# Patient Record
Sex: FEMALE | Race: WHITE | NOT HISPANIC OR LATINO | URBAN - METROPOLITAN AREA
[De-identification: names, ages, dates, MRNs, and addresses within clinical notes are randomized per-mention and may not be internally consistent; named-entity substitution may affect disease eponyms.]

---

## 2017-01-19 ENCOUNTER — OUTPATIENT (OUTPATIENT)
Dept: OUTPATIENT SERVICES | Facility: HOSPITAL | Age: 32
LOS: 1 days | Discharge: HOME | End: 2017-01-19

## 2017-06-27 DIAGNOSIS — N92.4 EXCESSIVE BLEEDING IN THE PREMENOPAUSAL PERIOD: ICD-10-CM

## 2017-06-27 DIAGNOSIS — F17.210 NICOTINE DEPENDENCE, CIGARETTES, UNCOMPLICATED: ICD-10-CM

## 2017-08-14 ENCOUNTER — APPOINTMENT (OUTPATIENT)
Dept: OTOLARYNGOLOGY | Facility: CLINIC | Age: 32
End: 2017-08-14
Payer: MEDICAID

## 2017-08-14 VITALS — BODY MASS INDEX: 23.9 KG/M2 | WEIGHT: 140 LBS | HEIGHT: 64 IN

## 2017-08-14 DIAGNOSIS — Z84.1 FAMILY HISTORY OF DISORDERS OF KIDNEY AND URETER: ICD-10-CM

## 2017-08-14 DIAGNOSIS — Z80.8 FAMILY HISTORY OF MALIGNANT NEOPLASM OF OTHER ORGANS OR SYSTEMS: ICD-10-CM

## 2017-08-14 DIAGNOSIS — Z80.1 FAMILY HISTORY OF MALIGNANT NEOPLASM OF TRACHEA, BRONCHUS AND LUNG: ICD-10-CM

## 2017-08-14 PROCEDURE — 99203 OFFICE O/P NEW LOW 30 MIN: CPT | Mod: 25

## 2017-08-14 PROCEDURE — 31231 NASAL ENDOSCOPY DX: CPT

## 2017-09-15 ENCOUNTER — RX RENEWAL (OUTPATIENT)
Age: 32
End: 2017-09-15

## 2017-09-19 ENCOUNTER — OUTPATIENT (OUTPATIENT)
Dept: OUTPATIENT SERVICES | Facility: HOSPITAL | Age: 32
LOS: 1 days | Discharge: HOME | End: 2017-09-19

## 2017-09-19 DIAGNOSIS — J34.89 OTHER SPECIFIED DISORDERS OF NOSE AND NASAL SINUSES: ICD-10-CM

## 2017-10-04 ENCOUNTER — APPOINTMENT (OUTPATIENT)
Dept: OTOLARYNGOLOGY | Facility: CLINIC | Age: 32
End: 2017-10-04
Payer: MEDICAID

## 2017-10-04 PROCEDURE — 99213 OFFICE O/P EST LOW 20 MIN: CPT

## 2017-11-02 ENCOUNTER — EMERGENCY (EMERGENCY)
Facility: HOSPITAL | Age: 32
LOS: 1 days | End: 2017-11-02
Admitting: GENERAL PRACTICE

## 2017-11-07 ENCOUNTER — OUTPATIENT (OUTPATIENT)
Dept: OUTPATIENT SERVICES | Facility: HOSPITAL | Age: 32
LOS: 1 days | Discharge: HOME | End: 2017-11-07

## 2017-11-07 DIAGNOSIS — Z01.818 ENCOUNTER FOR OTHER PREPROCEDURAL EXAMINATION: ICD-10-CM

## 2017-11-07 DIAGNOSIS — J34.89 OTHER SPECIFIED DISORDERS OF NOSE AND NASAL SINUSES: ICD-10-CM

## 2017-11-08 DIAGNOSIS — Z79.899 OTHER LONG TERM (CURRENT) DRUG THERAPY: ICD-10-CM

## 2017-11-08 DIAGNOSIS — N83.201 UNSPECIFIED OVARIAN CYST, RIGHT SIDE: ICD-10-CM

## 2017-11-08 DIAGNOSIS — Z98.51 TUBAL LIGATION STATUS: ICD-10-CM

## 2017-11-08 DIAGNOSIS — Z87.891 PERSONAL HISTORY OF NICOTINE DEPENDENCE: ICD-10-CM

## 2017-11-08 DIAGNOSIS — R10.2 PELVIC AND PERINEAL PAIN: ICD-10-CM

## 2017-11-13 ENCOUNTER — RECORD ABSTRACTING (OUTPATIENT)
Age: 32
End: 2017-11-13

## 2017-11-13 DIAGNOSIS — Z86.19 PERSONAL HISTORY OF OTHER INFECTIOUS AND PARASITIC DISEASES: ICD-10-CM

## 2017-11-21 ENCOUNTER — OUTPATIENT (OUTPATIENT)
Dept: OUTPATIENT SERVICES | Facility: HOSPITAL | Age: 32
LOS: 1 days | Discharge: HOME | End: 2017-11-21

## 2017-11-21 ENCOUNTER — APPOINTMENT (OUTPATIENT)
Dept: OTOLARYNGOLOGY | Facility: AMBULATORY SURGERY CENTER | Age: 32
End: 2017-11-21
Payer: MEDICAID

## 2017-11-21 PROCEDURE — 30520 REPAIR OF NASAL SEPTUM: CPT

## 2017-11-21 PROCEDURE — 31296 NSL/SINS NDSC SURG FRNT SINS: CPT | Mod: 50

## 2017-11-21 PROCEDURE — 31295 NSL/SINS NDSC SURG MAX SINS: CPT | Mod: 50

## 2017-11-21 PROCEDURE — 30140 RESECT INFERIOR TURBINATE: CPT | Mod: 50

## 2017-11-21 PROCEDURE — 69799 UNLISTED PX MIDDLE EAR: CPT

## 2017-11-27 ENCOUNTER — APPOINTMENT (OUTPATIENT)
Dept: OTOLARYNGOLOGY | Facility: CLINIC | Age: 32
End: 2017-11-27
Payer: MEDICAID

## 2017-11-27 VITALS — HEIGHT: 64 IN | BODY MASS INDEX: 24.41 KG/M2 | WEIGHT: 143 LBS

## 2017-11-27 PROCEDURE — 99024 POSTOP FOLLOW-UP VISIT: CPT

## 2017-11-28 DIAGNOSIS — J34.2 DEVIATED NASAL SEPTUM: ICD-10-CM

## 2017-11-28 DIAGNOSIS — J01.91 ACUTE RECURRENT SINUSITIS, UNSPECIFIED: ICD-10-CM

## 2017-11-28 DIAGNOSIS — H69.83 OTHER SPECIFIED DISORDERS OF EUSTACHIAN TUBE, BILATERAL: ICD-10-CM

## 2017-11-28 DIAGNOSIS — J34.89 OTHER SPECIFIED DISORDERS OF NOSE AND NASAL SINUSES: ICD-10-CM

## 2017-11-29 ENCOUNTER — APPOINTMENT (OUTPATIENT)
Dept: OTOLARYNGOLOGY | Facility: CLINIC | Age: 32
End: 2017-11-29
Payer: MEDICAID

## 2017-11-29 PROCEDURE — 99024 POSTOP FOLLOW-UP VISIT: CPT

## 2017-11-29 PROCEDURE — 31237 NSL/SINS NDSC SURG BX POLYPC: CPT | Mod: 50,78

## 2017-12-06 ENCOUNTER — APPOINTMENT (OUTPATIENT)
Dept: OTOLARYNGOLOGY | Facility: CLINIC | Age: 32
End: 2017-12-06
Payer: MEDICAID

## 2017-12-06 VITALS — BODY MASS INDEX: 24.41 KG/M2 | WEIGHT: 143 LBS | HEIGHT: 64 IN

## 2017-12-06 PROCEDURE — 99024 POSTOP FOLLOW-UP VISIT: CPT

## 2017-12-06 PROCEDURE — 31237 NSL/SINS NDSC SURG BX POLYPC: CPT | Mod: 50,58

## 2017-12-11 ENCOUNTER — APPOINTMENT (OUTPATIENT)
Dept: OBGYN | Facility: CLINIC | Age: 32
End: 2017-12-11
Payer: MEDICAID

## 2017-12-11 ENCOUNTER — APPOINTMENT (OUTPATIENT)
Dept: OBGYN | Facility: CLINIC | Age: 32
End: 2017-12-11

## 2017-12-11 PROCEDURE — 76830 TRANSVAGINAL US NON-OB: CPT

## 2017-12-11 PROCEDURE — 93975 VASCULAR STUDY: CPT

## 2017-12-13 ENCOUNTER — APPOINTMENT (OUTPATIENT)
Dept: OTOLARYNGOLOGY | Facility: CLINIC | Age: 32
End: 2017-12-13
Payer: MEDICAID

## 2017-12-13 VITALS — HEIGHT: 64 IN | WEIGHT: 145 LBS | BODY MASS INDEX: 24.75 KG/M2

## 2017-12-13 PROCEDURE — 31237 NSL/SINS NDSC SURG BX POLYPC: CPT | Mod: 50,58

## 2017-12-13 PROCEDURE — 99024 POSTOP FOLLOW-UP VISIT: CPT

## 2018-01-17 ENCOUNTER — APPOINTMENT (OUTPATIENT)
Dept: OTOLARYNGOLOGY | Facility: CLINIC | Age: 33
End: 2018-01-17
Payer: MEDICAID

## 2018-01-17 VITALS
DIASTOLIC BLOOD PRESSURE: 72 MMHG | BODY MASS INDEX: 24.75 KG/M2 | SYSTOLIC BLOOD PRESSURE: 121 MMHG | WEIGHT: 145 LBS | HEIGHT: 64 IN

## 2018-01-17 DIAGNOSIS — J01.91 ACUTE RECURRENT SINUSITIS, UNSPECIFIED: ICD-10-CM

## 2018-01-17 PROCEDURE — 31237 NSL/SINS NDSC SURG BX POLYPC: CPT | Mod: 58

## 2018-01-17 PROCEDURE — 99024 POSTOP FOLLOW-UP VISIT: CPT

## 2018-01-17 RX ORDER — TERCONAZOLE 4 MG/G
0.4 CREAM VAGINAL
Refills: 0 | Status: COMPLETED | COMMUNITY
End: 2018-01-17

## 2018-01-17 RX ORDER — METRONIDAZOLE 500 MG/1
500 TABLET, FILM COATED ORAL
Refills: 0 | Status: COMPLETED | COMMUNITY
End: 2018-01-17

## 2018-01-29 ENCOUNTER — EMERGENCY (EMERGENCY)
Facility: HOSPITAL | Age: 33
LOS: 0 days | Discharge: HOME | End: 2018-01-29

## 2018-01-29 DIAGNOSIS — R30.0 DYSURIA: ICD-10-CM

## 2018-01-29 DIAGNOSIS — R10.2 PELVIC AND PERINEAL PAIN: ICD-10-CM

## 2018-01-29 DIAGNOSIS — Z79.899 OTHER LONG TERM (CURRENT) DRUG THERAPY: ICD-10-CM

## 2018-01-29 DIAGNOSIS — E28.2 POLYCYSTIC OVARIAN SYNDROME: ICD-10-CM

## 2018-01-29 DIAGNOSIS — R11.2 NAUSEA WITH VOMITING, UNSPECIFIED: ICD-10-CM

## 2018-02-01 ENCOUNTER — APPOINTMENT (OUTPATIENT)
Dept: OBGYN | Facility: CLINIC | Age: 33
End: 2018-02-01
Payer: MEDICAID

## 2018-02-01 PROCEDURE — 99213 OFFICE O/P EST LOW 20 MIN: CPT

## 2018-02-06 ENCOUNTER — APPOINTMENT (OUTPATIENT)
Dept: OBGYN | Facility: CLINIC | Age: 33
End: 2018-02-06
Payer: MEDICAID

## 2018-02-06 PROCEDURE — 76830 TRANSVAGINAL US NON-OB: CPT

## 2018-02-06 PROCEDURE — 93975 VASCULAR STUDY: CPT

## 2018-03-06 ENCOUNTER — APPOINTMENT (OUTPATIENT)
Dept: DERMATOLOGY | Facility: CLINIC | Age: 33
End: 2018-03-06

## 2018-05-17 ENCOUNTER — TRANSCRIPTION ENCOUNTER (OUTPATIENT)
Age: 33
End: 2018-05-17

## 2018-06-11 ENCOUNTER — APPOINTMENT (OUTPATIENT)
Dept: OBGYN | Facility: CLINIC | Age: 33
End: 2018-06-11
Payer: MEDICAID

## 2018-06-11 PROCEDURE — 76830 TRANSVAGINAL US NON-OB: CPT

## 2018-06-11 PROCEDURE — 99214 OFFICE O/P EST MOD 30 MIN: CPT | Mod: 25

## 2018-06-11 PROCEDURE — 93975 VASCULAR STUDY: CPT

## 2018-06-29 ENCOUNTER — APPOINTMENT (OUTPATIENT)
Dept: OBGYN | Facility: CLINIC | Age: 33
End: 2018-06-29

## 2018-06-29 ENCOUNTER — OUTPATIENT (OUTPATIENT)
Dept: OUTPATIENT SERVICES | Facility: HOSPITAL | Age: 33
LOS: 1 days | Discharge: HOME | End: 2018-06-29

## 2018-06-29 VITALS
HEIGHT: 64 IN | SYSTOLIC BLOOD PRESSURE: 120 MMHG | DIASTOLIC BLOOD PRESSURE: 80 MMHG | BODY MASS INDEX: 23.22 KG/M2 | WEIGHT: 136 LBS

## 2018-06-29 DIAGNOSIS — E28.2 POLYCYSTIC OVARIAN SYNDROME: ICD-10-CM

## 2018-06-29 DIAGNOSIS — Z00.00 ENCOUNTER FOR GENERAL ADULT MEDICAL EXAMINATION W/OUT ABNORMAL FINDINGS: ICD-10-CM

## 2018-06-29 RX ORDER — FLUTICASONE PROPIONATE 50 UG/1
50 SPRAY, METERED NASAL
Qty: 16 | Refills: 0 | Status: DISCONTINUED | COMMUNITY
Start: 2017-08-14 | End: 2018-06-29

## 2018-06-29 RX ORDER — LEVOFLOXACIN 500 MG/1
500 TABLET, FILM COATED ORAL
Qty: 10 | Refills: 0 | Status: DISCONTINUED | COMMUNITY
Start: 2018-05-23

## 2018-06-29 RX ORDER — NITROFURANTOIN (MONOHYDRATE/MACROCRYSTALS) 25; 75 MG/1; MG/1
100 CAPSULE ORAL
Qty: 14 | Refills: 0 | Status: DISCONTINUED | COMMUNITY
Start: 2018-01-29

## 2018-06-29 RX ORDER — METHYLPREDNISOLONE 4 MG/1
4 TABLET ORAL
Qty: 21 | Refills: 0 | Status: DISCONTINUED | COMMUNITY
Start: 2018-05-17

## 2018-06-29 RX ORDER — GUAIFENESIN 600 MG/1
600 TABLET, EXTENDED RELEASE ORAL
Qty: 10 | Refills: 0 | Status: DISCONTINUED | COMMUNITY
Start: 2018-05-17

## 2018-06-29 RX ORDER — ALBUTEROL SULFATE 90 UG/1
108 (90 BASE) AEROSOL, METERED RESPIRATORY (INHALATION)
Qty: 18 | Refills: 0 | Status: DISCONTINUED | COMMUNITY
Start: 2018-05-17

## 2018-06-29 RX ORDER — BENZONATATE 100 MG/1
100 CAPSULE ORAL
Qty: 30 | Refills: 0 | Status: DISCONTINUED | COMMUNITY
Start: 2018-05-17

## 2018-07-09 DIAGNOSIS — Z00.00 ENCOUNTER FOR GENERAL ADULT MEDICAL EXAMINATION WITHOUT ABNORMAL FINDINGS: ICD-10-CM

## 2018-07-09 DIAGNOSIS — R10.2 PELVIC AND PERINEAL PAIN: ICD-10-CM

## 2018-07-09 DIAGNOSIS — J34.89 OTHER SPECIFIED DISORDERS OF NOSE AND NASAL SINUSES: ICD-10-CM

## 2018-07-09 DIAGNOSIS — J30.9 ALLERGIC RHINITIS, UNSPECIFIED: ICD-10-CM

## 2018-07-09 DIAGNOSIS — J01.91 ACUTE RECURRENT SINUSITIS, UNSPECIFIED: ICD-10-CM

## 2018-07-11 LAB — HPV HIGH+LOW RISK DNA PNL CVX: NOT DETECTED

## 2018-07-17 ENCOUNTER — OUTPATIENT (OUTPATIENT)
Dept: OUTPATIENT SERVICES | Facility: HOSPITAL | Age: 33
LOS: 1 days | Discharge: HOME | End: 2018-07-17

## 2018-07-17 VITALS
TEMPERATURE: 98 F | HEIGHT: 64 IN | WEIGHT: 138.45 LBS | DIASTOLIC BLOOD PRESSURE: 58 MMHG | HEART RATE: 68 BPM | SYSTOLIC BLOOD PRESSURE: 110 MMHG | RESPIRATION RATE: 16 BRPM | OXYGEN SATURATION: 99 %

## 2018-07-17 DIAGNOSIS — R10.2 PELVIC AND PERINEAL PAIN: ICD-10-CM

## 2018-07-17 DIAGNOSIS — Z98.890 OTHER SPECIFIED POSTPROCEDURAL STATES: Chronic | ICD-10-CM

## 2018-07-17 DIAGNOSIS — Z90.89 ACQUIRED ABSENCE OF OTHER ORGANS: Chronic | ICD-10-CM

## 2018-07-17 DIAGNOSIS — Z01.818 ENCOUNTER FOR OTHER PREPROCEDURAL EXAMINATION: ICD-10-CM

## 2018-07-17 DIAGNOSIS — Z98.51 TUBAL LIGATION STATUS: Chronic | ICD-10-CM

## 2018-07-17 DIAGNOSIS — J34.2 DEVIATED NASAL SEPTUM: Chronic | ICD-10-CM

## 2018-07-17 LAB
ALBUMIN SERPL ELPH-MCNC: 4.3 G/DL — SIGNIFICANT CHANGE UP (ref 3.5–5.2)
ALP SERPL-CCNC: 47 U/L — SIGNIFICANT CHANGE UP (ref 30–115)
ALT FLD-CCNC: 6 U/L — SIGNIFICANT CHANGE UP (ref 0–41)
ANION GAP SERPL CALC-SCNC: 15 MMOL/L — HIGH (ref 7–14)
APPEARANCE UR: CLEAR — SIGNIFICANT CHANGE UP
APTT BLD: 30.8 SEC — SIGNIFICANT CHANGE UP (ref 27–39.2)
AST SERPL-CCNC: 12 U/L — SIGNIFICANT CHANGE UP (ref 0–41)
BACTERIA # UR AUTO: ABNORMAL /HPF
BILIRUB SERPL-MCNC: <0.2 MG/DL — SIGNIFICANT CHANGE UP (ref 0.2–1.2)
BILIRUB UR-MCNC: NEGATIVE — SIGNIFICANT CHANGE UP
BLD GP AB SCN SERPL QL: SIGNIFICANT CHANGE UP
BUN SERPL-MCNC: 7 MG/DL — LOW (ref 10–20)
CALCIUM SERPL-MCNC: 9.5 MG/DL — SIGNIFICANT CHANGE UP (ref 8.5–10.1)
CHLORIDE SERPL-SCNC: 99 MMOL/L — SIGNIFICANT CHANGE UP (ref 98–110)
CO2 SERPL-SCNC: 25 MMOL/L — SIGNIFICANT CHANGE UP (ref 17–32)
COLOR SPEC: YELLOW — SIGNIFICANT CHANGE UP
CREAT SERPL-MCNC: 0.5 MG/DL — LOW (ref 0.7–1.5)
DIFF PNL FLD: ABNORMAL
EPI CELLS # UR: ABNORMAL /HPF
GLUCOSE SERPL-MCNC: 119 MG/DL — HIGH (ref 70–99)
GLUCOSE UR QL: NEGATIVE MG/DL — SIGNIFICANT CHANGE UP
HCT VFR BLD CALC: 38.2 % — SIGNIFICANT CHANGE UP (ref 37–47)
HGB BLD-MCNC: 12.7 G/DL — SIGNIFICANT CHANGE UP (ref 12–16)
INR BLD: 0.98 RATIO — SIGNIFICANT CHANGE UP (ref 0.65–1.3)
KETONES UR-MCNC: NEGATIVE — SIGNIFICANT CHANGE UP
LEUKOCYTE ESTERASE UR-ACNC: NEGATIVE — SIGNIFICANT CHANGE UP
MCHC RBC-ENTMCNC: 30.1 PG — SIGNIFICANT CHANGE UP (ref 27–31)
MCHC RBC-ENTMCNC: 33.2 G/DL — SIGNIFICANT CHANGE UP (ref 32–37)
MCV RBC AUTO: 90.5 FL — SIGNIFICANT CHANGE UP (ref 81–99)
NITRITE UR-MCNC: NEGATIVE — SIGNIFICANT CHANGE UP
NRBC # BLD: 0 /100 WBCS — SIGNIFICANT CHANGE UP (ref 0–0)
PH UR: 6.5 — SIGNIFICANT CHANGE UP (ref 5–8)
PLATELET # BLD AUTO: 203 K/UL — SIGNIFICANT CHANGE UP (ref 130–400)
POTASSIUM SERPL-MCNC: 3.6 MMOL/L — SIGNIFICANT CHANGE UP (ref 3.5–5)
POTASSIUM SERPL-SCNC: 3.6 MMOL/L — SIGNIFICANT CHANGE UP (ref 3.5–5)
PROT SERPL-MCNC: 7 G/DL — SIGNIFICANT CHANGE UP (ref 6–8)
PROT UR-MCNC: NEGATIVE MG/DL — SIGNIFICANT CHANGE UP
PROTHROM AB SERPL-ACNC: 10.6 SEC — SIGNIFICANT CHANGE UP (ref 9.95–12.87)
RBC # BLD: 4.22 M/UL — SIGNIFICANT CHANGE UP (ref 4.2–5.4)
RBC # FLD: 12.4 % — SIGNIFICANT CHANGE UP (ref 11.5–14.5)
SODIUM SERPL-SCNC: 139 MMOL/L — SIGNIFICANT CHANGE UP (ref 135–146)
SP GR SPEC: 1.01 — SIGNIFICANT CHANGE UP (ref 1.01–1.03)
TYPE + AB SCN PNL BLD: SIGNIFICANT CHANGE UP
UROBILINOGEN FLD QL: 0.2 MG/DL — SIGNIFICANT CHANGE UP (ref 0.2–0.2)
WBC # BLD: 9.13 K/UL — SIGNIFICANT CHANGE UP (ref 4.8–10.8)
WBC # FLD AUTO: 9.13 K/UL — SIGNIFICANT CHANGE UP (ref 4.8–10.8)

## 2018-07-17 NOTE — H&P PST ADULT - HISTORY OF PRESENT ILLNESS
32 year old female here for right ovarian cyst removal hx of polycystic ovaries , and has constant pain on right side  fos=3-4  denies chest pain sob palp  denies recent uri or had recent uti treated with ab last ab was july 10  PT DENEIS ANY RASHES, ABRASION, OR OPEN WOUNDS OR CUTS

## 2018-07-17 NOTE — H&P PST ADULT - NSANTHOSAYNRD_GEN_A_CORE
see norman tool/No. NORMAN screening performed.  STOP BANG Legend: 0-2 = LOW Risk; 3-4 = INTERMEDIATE Risk; 5-8 = HIGH Risk

## 2018-07-17 NOTE — H&P PST ADULT - PSH
Deviated septum  and adnoids removed 2017  H/O lumpectomy  left breast 2001  right breast 2007  History of tonsillectomy  1998  History of tubal ligation  2016  Status post endometrial ablation  2016

## 2018-07-18 DIAGNOSIS — Z87.891 PERSONAL HISTORY OF NICOTINE DEPENDENCE: ICD-10-CM

## 2018-07-25 PROBLEM — G43.909 MIGRAINE, UNSPECIFIED, NOT INTRACTABLE, WITHOUT STATUS MIGRAINOSUS: Chronic | Status: ACTIVE | Noted: 2018-07-17

## 2018-07-25 PROBLEM — E28.2 POLYCYSTIC OVARIAN SYNDROME: Chronic | Status: ACTIVE | Noted: 2018-07-17

## 2018-08-03 ENCOUNTER — OTHER (OUTPATIENT)
Age: 33
End: 2018-08-03

## 2018-08-13 ENCOUNTER — OUTPATIENT (OUTPATIENT)
Dept: OUTPATIENT SERVICES | Facility: HOSPITAL | Age: 33
LOS: 1 days | Discharge: HOME | End: 2018-08-13

## 2018-08-13 DIAGNOSIS — Z98.890 OTHER SPECIFIED POSTPROCEDURAL STATES: Chronic | ICD-10-CM

## 2018-08-13 DIAGNOSIS — N63.10 UNSPECIFIED LUMP IN THE RIGHT BREAST, UNSPECIFIED QUADRANT: ICD-10-CM

## 2018-08-13 DIAGNOSIS — Z90.89 ACQUIRED ABSENCE OF OTHER ORGANS: Chronic | ICD-10-CM

## 2018-08-13 DIAGNOSIS — J34.2 DEVIATED NASAL SEPTUM: Chronic | ICD-10-CM

## 2018-08-13 DIAGNOSIS — Z98.51 TUBAL LIGATION STATUS: Chronic | ICD-10-CM

## 2018-09-07 ENCOUNTER — APPOINTMENT (OUTPATIENT)
Dept: BREAST CENTER | Facility: CLINIC | Age: 33
End: 2018-09-07
Payer: MEDICAID

## 2018-09-07 VITALS
BODY MASS INDEX: 23.22 KG/M2 | SYSTOLIC BLOOD PRESSURE: 118 MMHG | HEIGHT: 64 IN | WEIGHT: 136 LBS | DIASTOLIC BLOOD PRESSURE: 68 MMHG

## 2018-09-07 PROCEDURE — 99203 OFFICE O/P NEW LOW 30 MIN: CPT

## 2018-09-11 ENCOUNTER — OUTPATIENT (OUTPATIENT)
Dept: OUTPATIENT SERVICES | Facility: HOSPITAL | Age: 33
LOS: 1 days | Discharge: HOME | End: 2018-09-11

## 2018-09-11 VITALS
SYSTOLIC BLOOD PRESSURE: 113 MMHG | TEMPERATURE: 97 F | WEIGHT: 137.79 LBS | RESPIRATION RATE: 16 BRPM | DIASTOLIC BLOOD PRESSURE: 65 MMHG | HEIGHT: 65 IN | HEART RATE: 62 BPM | OXYGEN SATURATION: 100 %

## 2018-09-11 DIAGNOSIS — Z98.890 OTHER SPECIFIED POSTPROCEDURAL STATES: Chronic | ICD-10-CM

## 2018-09-11 DIAGNOSIS — J34.2 DEVIATED NASAL SEPTUM: Chronic | ICD-10-CM

## 2018-09-11 DIAGNOSIS — Z01.818 ENCOUNTER FOR OTHER PREPROCEDURAL EXAMINATION: ICD-10-CM

## 2018-09-11 DIAGNOSIS — Z87.891 PERSONAL HISTORY OF NICOTINE DEPENDENCE: ICD-10-CM

## 2018-09-11 DIAGNOSIS — R10.2 PELVIC AND PERINEAL PAIN: ICD-10-CM

## 2018-09-11 DIAGNOSIS — Z98.51 TUBAL LIGATION STATUS: Chronic | ICD-10-CM

## 2018-09-11 DIAGNOSIS — Z90.89 ACQUIRED ABSENCE OF OTHER ORGANS: Chronic | ICD-10-CM

## 2018-09-11 LAB
APPEARANCE UR: CLEAR — SIGNIFICANT CHANGE UP
BACTERIA # UR AUTO: ABNORMAL /HPF
BILIRUB UR-MCNC: NEGATIVE — SIGNIFICANT CHANGE UP
BLD GP AB SCN SERPL QL: SIGNIFICANT CHANGE UP
COLOR SPEC: YELLOW — SIGNIFICANT CHANGE UP
DIFF PNL FLD: ABNORMAL
GLUCOSE UR QL: NEGATIVE MG/DL — SIGNIFICANT CHANGE UP
KETONES UR-MCNC: NEGATIVE — SIGNIFICANT CHANGE UP
LEUKOCYTE ESTERASE UR-ACNC: NEGATIVE — SIGNIFICANT CHANGE UP
NITRITE UR-MCNC: NEGATIVE — SIGNIFICANT CHANGE UP
PH UR: 6 — SIGNIFICANT CHANGE UP (ref 5–8)
PROT UR-MCNC: NEGATIVE MG/DL — SIGNIFICANT CHANGE UP
RBC CASTS # UR COMP ASSIST: ABNORMAL /HPF
SP GR SPEC: 1.02 — SIGNIFICANT CHANGE UP (ref 1.01–1.03)
TYPE + AB SCN PNL BLD: SIGNIFICANT CHANGE UP
UROBILINOGEN FLD QL: 0.2 MG/DL — SIGNIFICANT CHANGE UP (ref 0.2–0.2)

## 2018-09-11 NOTE — H&P PST ADULT - FAMILY HISTORY
Mother  Still living? Unknown  Family history of heart disease, Age at diagnosis: Age Unknown  Family history of diabetes mellitus, Age at diagnosis: Age Unknown  Family history of renal disease, Age at diagnosis: Age Unknown     Grandparent  Still living? Unknown  Family history of heart disease, Age at diagnosis: Age Unknown  Family history of diabetes mellitus, Age at diagnosis: Age Unknown

## 2018-09-11 NOTE — H&P PST ADULT - REASON FOR ADMISSION
32 year old female here for removal of right haley eovarian cyst,having pain on right side  fos= 3  denies chest pain sob palp  denies recent uri had urinary tract Infection in july and was treated with ab

## 2018-09-11 NOTE — H&P PST ADULT - PRIMARY CARE PROVIDER
Diagnostic laparoscopy possible right ovarian cystectomy, posible right salpingo-oophorectomy possible lysis of adhesions possible exploratory laparotomy

## 2018-09-12 LAB
CULTURE RESULTS: NO GROWTH — SIGNIFICANT CHANGE UP
SPECIMEN SOURCE: SIGNIFICANT CHANGE UP

## 2018-09-25 ENCOUNTER — OUTPATIENT (OUTPATIENT)
Dept: OUTPATIENT SERVICES | Facility: HOSPITAL | Age: 33
LOS: 1 days | Discharge: HOME | End: 2018-09-25

## 2018-09-25 VITALS
OXYGEN SATURATION: 98 % | SYSTOLIC BLOOD PRESSURE: 110 MMHG | DIASTOLIC BLOOD PRESSURE: 59 MMHG | HEART RATE: 74 BPM | RESPIRATION RATE: 15 BRPM

## 2018-09-25 VITALS
WEIGHT: 137.79 LBS | SYSTOLIC BLOOD PRESSURE: 105 MMHG | OXYGEN SATURATION: 100 % | HEART RATE: 76 BPM | HEIGHT: 65 IN | DIASTOLIC BLOOD PRESSURE: 53 MMHG | RESPIRATION RATE: 18 BRPM | TEMPERATURE: 98 F

## 2018-09-25 DIAGNOSIS — Z98.51 TUBAL LIGATION STATUS: Chronic | ICD-10-CM

## 2018-09-25 DIAGNOSIS — Z98.890 OTHER SPECIFIED POSTPROCEDURAL STATES: Chronic | ICD-10-CM

## 2018-09-25 DIAGNOSIS — Z90.89 ACQUIRED ABSENCE OF OTHER ORGANS: Chronic | ICD-10-CM

## 2018-09-25 DIAGNOSIS — J34.2 DEVIATED NASAL SEPTUM: Chronic | ICD-10-CM

## 2018-09-25 RX ORDER — SODIUM CHLORIDE 9 MG/ML
1000 INJECTION, SOLUTION INTRAVENOUS
Qty: 0 | Refills: 0 | Status: DISCONTINUED | OUTPATIENT
Start: 2018-09-25 | End: 2018-10-10

## 2018-09-25 RX ORDER — OXYCODONE AND ACETAMINOPHEN 5; 325 MG/1; MG/1
1 TABLET ORAL EVERY 4 HOURS
Qty: 0 | Refills: 0 | Status: DISCONTINUED | OUTPATIENT
Start: 2018-09-25 | End: 2018-09-25

## 2018-09-25 RX ORDER — ONDANSETRON 8 MG/1
4 TABLET, FILM COATED ORAL ONCE
Qty: 0 | Refills: 0 | Status: DISCONTINUED | OUTPATIENT
Start: 2018-09-25 | End: 2018-10-10

## 2018-09-25 RX ORDER — MORPHINE SULFATE 50 MG/1
2 CAPSULE, EXTENDED RELEASE ORAL
Qty: 0 | Refills: 0 | Status: DISCONTINUED | OUTPATIENT
Start: 2018-09-25 | End: 2018-09-25

## 2018-09-25 RX ADMIN — SODIUM CHLORIDE 100 MILLILITER(S): 9 INJECTION, SOLUTION INTRAVENOUS at 14:00

## 2018-09-25 NOTE — CHART NOTE - NSCHARTNOTEFT_GEN_A_CORE
P[facility] ANESTHESIA ADMISSION NOTE      Procedure: Laparoscopy, diagnostic, for chronic pelvic pain    Post op diagnosis:  Pelvic pain in female      ____  Intubated  TV:______       Rate: ______      FiO2: ______    _x___  Patent Airway    _x___  Full return of protective reflexes    _x___  Full recovery from anesthesia / back to baseline status    Vitals:            T:  97.5              BP :    133/79            R: 12             Sat: 98              P:76      Mental Status:  _x___ Awake   _____ Alert   _____ Drowsy   _____ Sedated    Nausea/Vomiting:  _x___  NO       ______Yes,   See Post - Op Orders         Pain Scale (0-10):  __0___    Treatment: _x___ None    ____ See Post - Op/PCA Orders    Post - Operative Fluids:   __x__ Oral   ____ See Post - Op Orders    Plan: Discharge:   _x___Home       _____Floor     _____Critical Care    _____  Other:_________________    Comments:  No anesthesia issues or complications noted.  Discharge when criteria met.

## 2018-09-25 NOTE — BRIEF OPERATIVE NOTE - OPERATION/FINDINGS
normal sized uterus-mobile, b/l normal ovaries mobile, tubes consistent with prior tubal, no other tubal issues, no adhesions, normal cul de sac, b/l normal ovarian fossa, normal appendiceal area.

## 2018-09-25 NOTE — BRIEF OPERATIVE NOTE - PROCEDURE
<<-----Click on this checkbox to enter Procedure Laparoscopy, diagnostic, for chronic pelvic pain  09/25/2018    Active  [id number]

## 2018-09-25 NOTE — ASU DISCHARGE PLAN (ADULT/PEDIATRIC). - SPECIAL INSTRUCTIONS
-Regular activity and diet  -Motrin 600mg for pain every 6 hours as needed, percocet sent to pharmacy  -Keep incisions clean and dry. Remove dressings in 24hrs.  -Nothing in the vagina for 2 weeks - no sex, tampons, douching, tub baths, or pools. May shower.  -Follow up in 2 weeks with Dr. Trimble for post-operative check at the [facility].

## 2018-09-28 DIAGNOSIS — F17.210 NICOTINE DEPENDENCE, CIGARETTES, UNCOMPLICATED: ICD-10-CM

## 2018-09-28 DIAGNOSIS — R10.2 PELVIC AND PERINEAL PAIN: ICD-10-CM

## 2018-09-28 DIAGNOSIS — G89.29 OTHER CHRONIC PAIN: ICD-10-CM

## 2018-10-16 ENCOUNTER — APPOINTMENT (OUTPATIENT)
Dept: OBGYN | Facility: CLINIC | Age: 33
End: 2018-10-16

## 2018-10-16 ENCOUNTER — OUTPATIENT (OUTPATIENT)
Dept: OUTPATIENT SERVICES | Facility: HOSPITAL | Age: 33
LOS: 1 days | Discharge: HOME | End: 2018-10-16

## 2018-10-16 VITALS
DIASTOLIC BLOOD PRESSURE: 76 MMHG | BODY MASS INDEX: 23.47 KG/M2 | SYSTOLIC BLOOD PRESSURE: 116 MMHG | WEIGHT: 137.5 LBS | HEIGHT: 64 IN

## 2018-10-16 DIAGNOSIS — Z98.51 TUBAL LIGATION STATUS: Chronic | ICD-10-CM

## 2018-10-16 DIAGNOSIS — Z98.890 OTHER SPECIFIED POSTPROCEDURAL STATES: Chronic | ICD-10-CM

## 2018-10-16 DIAGNOSIS — Z87.898 PERSONAL HISTORY OF OTHER SPECIFIED CONDITIONS: ICD-10-CM

## 2018-10-16 DIAGNOSIS — B37.2 CANDIDIASIS OF SKIN AND NAIL: ICD-10-CM

## 2018-10-16 DIAGNOSIS — Z90.89 ACQUIRED ABSENCE OF OTHER ORGANS: Chronic | ICD-10-CM

## 2018-10-16 DIAGNOSIS — J34.2 DEVIATED NASAL SEPTUM: Chronic | ICD-10-CM

## 2018-10-16 RX ORDER — CLOTRIMAZOLE AND BETAMETHASONE DIPROPIONATE 10; .5 MG/G; MG/G
1-0.05 CREAM TOPICAL TWICE DAILY
Qty: 1 | Refills: 3 | Status: ACTIVE | COMMUNITY
Start: 2018-10-16 | End: 1900-01-01

## 2018-10-17 DIAGNOSIS — B37.2 CANDIDIASIS OF SKIN AND NAIL: ICD-10-CM

## 2018-12-17 ENCOUNTER — APPOINTMENT (OUTPATIENT)
Dept: BREAST CENTER | Facility: CLINIC | Age: 33
End: 2018-12-17

## 2019-02-13 ENCOUNTER — FORM ENCOUNTER (OUTPATIENT)
Age: 34
End: 2019-02-13

## 2019-02-14 ENCOUNTER — OUTPATIENT (OUTPATIENT)
Dept: OUTPATIENT SERVICES | Facility: HOSPITAL | Age: 34
LOS: 1 days | Discharge: HOME | End: 2019-02-14

## 2019-02-14 DIAGNOSIS — Z98.51 TUBAL LIGATION STATUS: Chronic | ICD-10-CM

## 2019-02-14 DIAGNOSIS — Z98.890 OTHER SPECIFIED POSTPROCEDURAL STATES: Chronic | ICD-10-CM

## 2019-02-14 DIAGNOSIS — Z90.89 ACQUIRED ABSENCE OF OTHER ORGANS: Chronic | ICD-10-CM

## 2019-02-14 DIAGNOSIS — J34.2 DEVIATED NASAL SEPTUM: Chronic | ICD-10-CM

## 2019-02-14 DIAGNOSIS — R92.8 OTHER ABNORMAL AND INCONCLUSIVE FINDINGS ON DIAGNOSTIC IMAGING OF BREAST: ICD-10-CM

## 2019-03-01 ENCOUNTER — OUTPATIENT (OUTPATIENT)
Dept: OUTPATIENT SERVICES | Facility: HOSPITAL | Age: 34
LOS: 1 days | End: 2019-03-01
Payer: MEDICAID

## 2019-03-01 DIAGNOSIS — J34.2 DEVIATED NASAL SEPTUM: Chronic | ICD-10-CM

## 2019-03-01 DIAGNOSIS — Z98.51 TUBAL LIGATION STATUS: Chronic | ICD-10-CM

## 2019-03-01 DIAGNOSIS — Z98.890 OTHER SPECIFIED POSTPROCEDURAL STATES: Chronic | ICD-10-CM

## 2019-03-01 DIAGNOSIS — Z90.89 ACQUIRED ABSENCE OF OTHER ORGANS: Chronic | ICD-10-CM

## 2019-03-01 PROCEDURE — G9001: CPT

## 2019-03-18 ENCOUNTER — APPOINTMENT (OUTPATIENT)
Dept: BREAST CENTER | Facility: CLINIC | Age: 34
End: 2019-03-18
Payer: MEDICAID

## 2019-03-18 VITALS
BODY MASS INDEX: 23.39 KG/M2 | DIASTOLIC BLOOD PRESSURE: 76 MMHG | TEMPERATURE: 98.2 F | HEIGHT: 64 IN | WEIGHT: 137 LBS | SYSTOLIC BLOOD PRESSURE: 124 MMHG

## 2019-03-18 DIAGNOSIS — N63.20 UNSPECIFIED LUMP IN THE LEFT BREAST, UNSPECIFIED QUADRANT: ICD-10-CM

## 2019-03-18 PROCEDURE — 99213 OFFICE O/P EST LOW 20 MIN: CPT

## 2019-03-19 NOTE — HISTORY OF PRESENT ILLNESS
[FreeTextEntry1] : Birdie is a 32F who presents with a self palpated right breast mass. \par \par She first noticed this mass several weeks prior.  She underwent the following diagnostic imaging at that time:\par \par 18 -- b/l dx mammogram and R breast US \par -heterogenously dense breasts \par -R breast mass in UOQ, 1.1 cm\par -no suspicious architectural distortion or calcifications identified \par R breast US \par -@10:00, 10 cm FN, oval circumscribed hypoechoic mass, 1 x 1 x 0.6 cm \par BIRADS 3: rec R dx mammogram and US in 6 months \par \par She also has tenderness in the UOQ in the area of this right breast mass.  She denies any overlying skin changes, fevers or chills, does not think that the mass has changed in size at all and she has not had any nipple discharge b/l or palpated any left sided breast lesions. Of note, she has had her nipple pierced in the past and is a current smoker. She also has polycystic ovarian syndrome and is scheduled for a R salpingo-oophorectomy on 18 for a painful R ovarian cyst.  \par \par HISTORICAL RISK FACTORS: \par -2 prior lumpectomy:\par     @age 17, R inferior lumpectomy -- benign \par     @age 21, L UIQ lumpectomy -- benign \par -family history of breast cancer in two maternal aunts, dx in their 50s \par -, age at first live birth was 22\par -currently on OCPs\par \par RISK ASSESSMENT:\par Betsy \par 5 yr -- 1.3%\par lifetime -- 10.9%\par \par TC v 6 \par 5yr -- 2%\par lifetime -- 12.7%\par \par INTERVAL HISTORY: \par Birdie returns for a 6 month follow up visit for b/l breast pain and a R breast mass @10N10 deemed BIRADS 3 on US.  \par \par She still has breast pain in bilateral breasts, but the pain is cyclical and worse on the right breast. She has tried caffeine cessation and supportive bras without any relief of her pain.  Of note, she the pain is worst in the R breast UOQ where her mass was identified.  She has not palpated any new lesions and denies any nipple discharge or retraction.\par \par Her most recent imaging was a R mammogram and US on 19 which showed stability of her right breast mass @10N10, measuring 1 x 1 x 0.5 cm, deemed BIRADS 3.\par \par \par Since her last visit, she has undergone an ovarian cystectomy for a painful ovarian cyst. \par \par

## 2019-03-19 NOTE — PHYSICAL EXAM
[Normocephalic] : normocephalic [Atraumatic] : atraumatic [EOMI] : extra ocular movement intact [No Supraclavicular Adenopathy] : no supraclavicular adenopathy [No Cervical Adenopathy] : no cervical adenopathy [Symmetrical] : symmetrical [No dominant masses] : no dominant masses left breast [No Nipple Retraction] : no left nipple retraction [No Nipple Discharge] : no left nipple discharge [No Axillary Lymphadenopathy] : no left axillary lymphadenopathy [Soft] : abdomen soft [Not Tender] : non-tender [No Edema] : no edema [No Rashes] : no rashes [No Ulceration] : no ulceration [de-identified] : in UOQ, @10:00, 10 cm FN, there is a 1 cm mobile rubbery mass without any overlying skin changes, likely same mass that was visualized on US and mammo, no other suspicious lesions were palpated  [Examined in the supine and seated position] : examined in the supine and seated position [de-identified] : 5 mm nodule palpated @3-4:00, 7-8 cm FN, mobile, feels like fibrocystic breast changes, no other suspicious lesions palpated

## 2019-03-19 NOTE — REVIEW OF SYSTEMS
[Breast Lump] : breast lump [Breast Pain] : breast pain [Negative] : Heme/Lymph [Fever] : no fever [Chills] : no chills [Feeling Poorly] : not feeling poorly [Feeling Tired] : not feeling tired [As Noted in HPI] : as noted in HPI [Pelvic Pain] : pelvic pain [Abn Vaginal Bleeding] : no unexplained vaginal bleeding [Skin Lesions] : no skin lesions [Skin Wound] : no skin wound [Hot Flashes] : no hot flashes

## 2019-03-19 NOTE — DATA REVIEWED
[FreeTextEntry1] : EXAM: MG MAMMO DIAG W [facility] BI# \par EXAM: US BREAST LIMITED BI \par \par \par PROCEDURE DATE: 08/13/2018 \par \par \par \par INTERPRETATION: Clinical History / Reason for exam: Patient presents with a \par palpable lump in the upper outer quadrant of the right breast. \par \par The patient reports her last clinical breast examination was performed over \par one year ago \par \par Spot magnification imaging of the symptomatic area was performed in addition \par to routine mammographic projections including tomosynthesis. \par \par Computer-aided detection was utilized in the interpretation of this \par examination. \par \par No prior mammograms are available for comparison. \par \par Breast composition:The breasts are heterogeneously dense, which may obscure \par small masses. \par \par In the region of patient's palpable abnormality in the upper outer quadrant \par of the right breast, no suspicious findings are seen. There is a mass in the \par upper outer quadrant of the right breast measuring 1.1 cm. See the sonogram \par report below. No areas of suspicious architectural distortion or abnormal \par groups of calcifications are identified. \par \par Whole Right Breast Sonogram: \par \par In the region of patient's palpable abnormality at 2:00 location 10 cm from \par the nipple, no suspicious findings are identified. \par \par There is an oval circumscribed hypoechoic mass at the 10:00 location 10 cm \par from the nipple corresponding to the mammographic findings measuring 1.0 cm \par x 1.0 cm x 0.6 cm. This is probably benign. \par \par Evaluation of the right axilla demonstrates normal-appearing lymph nodes. \par \par IMPRESSION: No mammographic or sonographic correlate for the reported \par palpable abnormality in the right breast. The failure to confirm a lesion \par mammographically or sonographically should not deter biopsy if there is felt \par to be a clinically suspicious palpable abnormality. \par \par Probably benign mass seen mammographically and sonographically at the 10:00 \par location 10 cm from the nipple as above. \par \par Recommendation: Follow-up unilateral diagnostic mammogram and ultrasound in \par 6 months. \par \par BI-RADS category 3: Probably Benign \par

## 2019-03-19 NOTE — ASSESSMENT
[FreeTextEntry1] : Birdie is a 33F who presents with a right breast mass, also visualized on imaging as a BIRADS 3 lesion. \par \par I was able to palpate a 1 cm R UOQ breast mass that is rubbery in nature and mobile.  In addition, she has a nodule in her left breast @3-4:00, 7-8 cm FN that feels like fibrocystic changes.   Her most recent imaging was on 2/14/19, a R dx mammogram and US which revealed this right breast mass as a STABLE oval circumscribed and hypoechoic, measuring 1 x 1 x 0.5 cm @10:00, 10 cm FN, deemed BIRADS 3.  No other suspicious lesions were seen in her right breast. \par \par Because of her left breast findings on exam today, I will have her undergo a b/l dx mammogram and b/l breast US in 6 months.  This will be scheduled for her today.  I will have her follow up with me after her repeat imaging. \par \par In regards to her breast pain, it may be related to fibrocystic changes within her breast that are hormonally influenced. We spoke about possible interventions including evening primrose oil, supportive bras, smoking cessation and decreasing caffeine intake.  Although none of these have been consistently proven to improve breast pain, they may be tried.  If the pain becomes very severe, there have been studies of tamoxifen being effective for the treatment of breast pain, although there are risks with tamoxifen especially because she is still smoking.  At this time she will try supportive measures.\par \par In regards to her pain, if these supportive measures do not alleviate her breast pain, then we could consider performing a biopsy and possible excision of this right breast lesion, however at this time we will just follow her clinically.  She will call back if she wants to proceed with biopsy and subsequent excision.  \par \par We discussed BIRADS 3 lesions.  These lesions have a 2% chance of harboring malignancy.  There is always an option to obtain a tissue sample with a biopsy to confirm the diagnosis.   The procedure was described in detail including the placement of a tissue marker clip.  The risks of the procedure, including but not limited to bleeding and infection were also explained.  She is not interested in biopsy at this time and agrees to continue with short-term interval imaging.\par \par We also discussed dense breasts.  Increasing breast density has been found to increase ones risk of breast cancer, but at this time, there is no clear indication for additional imaging in this setting, as both US and MRI have not been found to improve survival.  One can consider bilateral screening US.  However, out of 1000 women screened, the use of routine US will only identify an additional 3-5 cancers.  The use of US was found to increase the likelihood of undergoing more imaging and more biopsies.  She does have dense breasts.  We have decided to proceed with screening bilateral breast US at this time.  This will be scheduled with her next screening mammogram.\par \par Based off her family history  her risk assessment is as follows: \par RISK ASSESSMENT:\par Betsy \par 5 yr -- 1.3%\par lifetime -- 10.9%\par \par TC v 6 \par 5yr -- 2%\par lifetime -- 12.7%\par \par This puts her at an average risk for breast cancer.  She should start yearly screening mammogram/US at the age 40 and annually thereafter. \par \par All of her questions were answered.  She knows to call with any further questions or concerns. \par \par PLAN\par -b/l dx mammogram and US on 8/14/19\par -f/up in after imaging

## 2019-03-19 NOTE — PAST MEDICAL HISTORY
[Menstruating] : The patient is menstruating [Menarche Age ____] : age at menarche was [unfilled] [Definite ___ (Date)] : the last menstrual period was [unfilled] [Regular Cycle Intervals] : have been regular [Normal Amount/Duration] : it was of a normal amount and duration [Total Preg ___] : G[unfilled] [Age At Live Birth ___] : Age at live birth: [unfilled] [Live Births ___] : P[unfilled]  [History of Hormone Replacement Treatment] : has no history of hormone replacement treatment [FreeTextEntry5] : s/p endometrial ablation and tubal ligation in 2016\par scheduled for a R salpingo-oophorectomy for painful ovarian cyst on 9/25/18 [FreeTextEntry6] : denies [FreeTextEntry7] : yes currently  [FreeTextEntry8] : denies

## 2019-03-20 DIAGNOSIS — Z71.89 OTHER SPECIFIED COUNSELING: ICD-10-CM

## 2019-05-18 ENCOUNTER — EMERGENCY (EMERGENCY)
Facility: HOSPITAL | Age: 34
LOS: 0 days | Discharge: HOME | End: 2019-05-18
Attending: STUDENT IN AN ORGANIZED HEALTH CARE EDUCATION/TRAINING PROGRAM | Admitting: STUDENT IN AN ORGANIZED HEALTH CARE EDUCATION/TRAINING PROGRAM
Payer: MEDICAID

## 2019-05-18 VITALS
HEART RATE: 78 BPM | RESPIRATION RATE: 18 BRPM | TEMPERATURE: 98 F | OXYGEN SATURATION: 99 % | SYSTOLIC BLOOD PRESSURE: 119 MMHG | DIASTOLIC BLOOD PRESSURE: 64 MMHG

## 2019-05-18 VITALS — HEIGHT: 65 IN | WEIGHT: 130.07 LBS

## 2019-05-18 DIAGNOSIS — R10.9 UNSPECIFIED ABDOMINAL PAIN: ICD-10-CM

## 2019-05-18 DIAGNOSIS — J34.2 DEVIATED NASAL SEPTUM: Chronic | ICD-10-CM

## 2019-05-18 DIAGNOSIS — Z90.89 ACQUIRED ABSENCE OF OTHER ORGANS: Chronic | ICD-10-CM

## 2019-05-18 DIAGNOSIS — Z79.899 OTHER LONG TERM (CURRENT) DRUG THERAPY: ICD-10-CM

## 2019-05-18 DIAGNOSIS — Z91.013 ALLERGY TO SEAFOOD: ICD-10-CM

## 2019-05-18 DIAGNOSIS — Z98.51 TUBAL LIGATION STATUS: Chronic | ICD-10-CM

## 2019-05-18 DIAGNOSIS — Z87.891 PERSONAL HISTORY OF NICOTINE DEPENDENCE: ICD-10-CM

## 2019-05-18 DIAGNOSIS — Z98.890 OTHER SPECIFIED POSTPROCEDURAL STATES: Chronic | ICD-10-CM

## 2019-05-18 LAB
ALBUMIN SERPL ELPH-MCNC: 4.4 G/DL — SIGNIFICANT CHANGE UP (ref 3.5–5.2)
ALP SERPL-CCNC: 61 U/L — SIGNIFICANT CHANGE UP (ref 30–115)
ALT FLD-CCNC: 10 U/L — SIGNIFICANT CHANGE UP (ref 0–41)
ANION GAP SERPL CALC-SCNC: 14 MMOL/L — SIGNIFICANT CHANGE UP (ref 7–14)
APPEARANCE UR: ABNORMAL
AST SERPL-CCNC: 13 U/L — SIGNIFICANT CHANGE UP (ref 0–41)
BACTERIA # UR AUTO: ABNORMAL /HPF
BASOPHILS # BLD AUTO: 0.09 K/UL — SIGNIFICANT CHANGE UP (ref 0–0.2)
BASOPHILS NFR BLD AUTO: 0.8 % — SIGNIFICANT CHANGE UP (ref 0–1)
BILIRUB SERPL-MCNC: 0.2 MG/DL — SIGNIFICANT CHANGE UP (ref 0.2–1.2)
BILIRUB UR-MCNC: NEGATIVE — SIGNIFICANT CHANGE UP
BUN SERPL-MCNC: 10 MG/DL — SIGNIFICANT CHANGE UP (ref 10–20)
CALCIUM SERPL-MCNC: 9.5 MG/DL — SIGNIFICANT CHANGE UP (ref 8.5–10.1)
CHLORIDE SERPL-SCNC: 101 MMOL/L — SIGNIFICANT CHANGE UP (ref 98–110)
CO2 SERPL-SCNC: 23 MMOL/L — SIGNIFICANT CHANGE UP (ref 17–32)
COLOR SPEC: YELLOW — SIGNIFICANT CHANGE UP
CREAT SERPL-MCNC: 0.8 MG/DL — SIGNIFICANT CHANGE UP (ref 0.7–1.5)
DIFF PNL FLD: ABNORMAL
EOSINOPHIL # BLD AUTO: 0.11 K/UL — SIGNIFICANT CHANGE UP (ref 0–0.7)
EOSINOPHIL NFR BLD AUTO: 1 % — SIGNIFICANT CHANGE UP (ref 0–8)
GLUCOSE SERPL-MCNC: 80 MG/DL — SIGNIFICANT CHANGE UP (ref 70–99)
GLUCOSE UR QL: NEGATIVE MG/DL — SIGNIFICANT CHANGE UP
HCG SERPL QL: NEGATIVE — SIGNIFICANT CHANGE UP
HCT VFR BLD CALC: 40.1 % — SIGNIFICANT CHANGE UP (ref 37–47)
HGB BLD-MCNC: 13.6 G/DL — SIGNIFICANT CHANGE UP (ref 12–16)
IMM GRANULOCYTES NFR BLD AUTO: 0.5 % — HIGH (ref 0.1–0.3)
KETONES UR-MCNC: NEGATIVE — SIGNIFICANT CHANGE UP
LACTATE SERPL-SCNC: 1.5 MMOL/L — SIGNIFICANT CHANGE UP (ref 0.5–2.2)
LEUKOCYTE ESTERASE UR-ACNC: ABNORMAL
LIDOCAIN IGE QN: 22 U/L — SIGNIFICANT CHANGE UP (ref 7–60)
LYMPHOCYTES # BLD AUTO: 3.74 K/UL — HIGH (ref 1.2–3.4)
LYMPHOCYTES # BLD AUTO: 34 % — SIGNIFICANT CHANGE UP (ref 20.5–51.1)
MCHC RBC-ENTMCNC: 30.9 PG — SIGNIFICANT CHANGE UP (ref 27–31)
MCHC RBC-ENTMCNC: 33.9 G/DL — SIGNIFICANT CHANGE UP (ref 32–37)
MCV RBC AUTO: 91.1 FL — SIGNIFICANT CHANGE UP (ref 81–99)
MONOCYTES # BLD AUTO: 0.95 K/UL — HIGH (ref 0.1–0.6)
MONOCYTES NFR BLD AUTO: 8.6 % — SIGNIFICANT CHANGE UP (ref 1.7–9.3)
NEUTROPHILS # BLD AUTO: 6.04 K/UL — SIGNIFICANT CHANGE UP (ref 1.4–6.5)
NEUTROPHILS NFR BLD AUTO: 55.1 % — SIGNIFICANT CHANGE UP (ref 42.2–75.2)
NITRITE UR-MCNC: NEGATIVE — SIGNIFICANT CHANGE UP
NRBC # BLD: 0 /100 WBCS — SIGNIFICANT CHANGE UP (ref 0–0)
PH UR: 6.5 — SIGNIFICANT CHANGE UP (ref 5–8)
PLATELET # BLD AUTO: 202 K/UL — SIGNIFICANT CHANGE UP (ref 130–400)
POTASSIUM SERPL-MCNC: 3.9 MMOL/L — SIGNIFICANT CHANGE UP (ref 3.5–5)
POTASSIUM SERPL-SCNC: 3.9 MMOL/L — SIGNIFICANT CHANGE UP (ref 3.5–5)
PROT SERPL-MCNC: 6.9 G/DL — SIGNIFICANT CHANGE UP (ref 6–8)
PROT UR-MCNC: NEGATIVE MG/DL — SIGNIFICANT CHANGE UP
RBC # BLD: 4.4 M/UL — SIGNIFICANT CHANGE UP (ref 4.2–5.4)
RBC # FLD: 12.8 % — SIGNIFICANT CHANGE UP (ref 11.5–14.5)
RBC CASTS # UR COMP ASSIST: ABNORMAL /HPF
SODIUM SERPL-SCNC: 138 MMOL/L — SIGNIFICANT CHANGE UP (ref 135–146)
SP GR SPEC: 1.01 — SIGNIFICANT CHANGE UP (ref 1.01–1.03)
UROBILINOGEN FLD QL: 0.2 MG/DL — SIGNIFICANT CHANGE UP (ref 0.2–0.2)
WBC # BLD: 10.99 K/UL — HIGH (ref 4.8–10.8)
WBC # FLD AUTO: 10.99 K/UL — HIGH (ref 4.8–10.8)
WBC UR QL: SIGNIFICANT CHANGE UP /HPF

## 2019-05-18 PROCEDURE — 74177 CT ABD & PELVIS W/CONTRAST: CPT | Mod: 26

## 2019-05-18 PROCEDURE — 99284 EMERGENCY DEPT VISIT MOD MDM: CPT

## 2019-05-18 RX ORDER — SODIUM CHLORIDE 9 MG/ML
1000 INJECTION, SOLUTION INTRAVENOUS ONCE
Refills: 0 | Status: COMPLETED | OUTPATIENT
Start: 2019-05-18 | End: 2019-05-18

## 2019-05-18 RX ORDER — METHOCARBAMOL 500 MG/1
750 TABLET, FILM COATED ORAL ONCE
Refills: 0 | Status: COMPLETED | OUTPATIENT
Start: 2019-05-18 | End: 2019-05-18

## 2019-05-18 RX ORDER — KETOROLAC TROMETHAMINE 30 MG/ML
15 SYRINGE (ML) INJECTION ONCE
Refills: 0 | Status: DISCONTINUED | OUTPATIENT
Start: 2019-05-18 | End: 2019-05-18

## 2019-05-18 RX ORDER — METHOCARBAMOL 500 MG/1
2 TABLET, FILM COATED ORAL
Qty: 24 | Refills: 0
Start: 2019-05-18 | End: 2019-05-21

## 2019-05-18 RX ADMIN — Medication 15 MILLIGRAM(S): at 19:40

## 2019-05-18 RX ADMIN — SODIUM CHLORIDE 1000 MILLILITER(S): 9 INJECTION, SOLUTION INTRAVENOUS at 19:41

## 2019-05-18 RX ADMIN — METHOCARBAMOL 750 MILLIGRAM(S): 500 TABLET, FILM COATED ORAL at 20:16

## 2019-05-18 NOTE — ED PROVIDER NOTE - CLINICAL SUMMARY MEDICAL DECISION MAKING FREE TEXT BOX
pt here w/ R flank pain x 2 days, reproducible on exam from mid-right ribs down, more indicative of msk etiology by exam, however, given degree of pain, worsening sx and radiation w/u including belly labs and ctap performed. no acute findings on ctap, borderline ua, uctx sent. clinically not pyelo. +frequency w/o dysuria. pt d/c on analgesia for presumed msk etiology, pcp f/u, all results given to pt for f/u

## 2019-05-18 NOTE — ED ADULT NURSE NOTE - CHPI ED NUR SYMPTOMS NEG
no abdominal distension/no nausea/no diarrhea/no vomiting/no fever/no blood in stool/no dysuria/no burning urination/no hematuria

## 2019-05-18 NOTE — ED PROVIDER NOTE - ATTENDING CONTRIBUTION TO CARE
34 yo f hx renal colic, pcos, endometrial ablation here for R flank pain. sx xyesterday. getting worse. intermittent pain. no nausea/vomiting. frequency w/o dysuria. no fever. no vaginal bleeding or discharge.    vss  gen- NAD, aaox3  card-rrr  lungs-ctab, no wheezing or rhonchi  abd-sntnd, no guarding or rebound, + R cvat  neuro- full str/sensation, cn ii-xii grossly intact, normal coordination    c/f renal colic, less likely pyelo/appy/colitis  belly labs, ctap, ua, upreg, supportive care

## 2019-05-18 NOTE — ED PROVIDER NOTE - NSFOLLOWUPINSTRUCTIONS_ED_ALL_ED_FT
Please follow up with your primary care physician in 1-2 days.     Back Pain    Back pain is very common in adults. The cause of back pain is rarely dangerous and the pain often gets better over time. The cause of your back pain may not be known and may include strain of muscles or ligaments, degeneration of the spinal disks, or arthritis. Occasionally the pain may radiate down your leg(s). Over-the-counter medicines to reduce pain and inflammation are often the most helpful. Stretching and remaining active frequently helps the healing process.     SEEK IMMEDIATE MEDICAL CARE IF YOU HAVE ANY OF THE FOLLOWING SYMPTOMS: bowel or bladder control problems, unusual weakness or numbness in your arms or legs, nausea or vomiting, abdominal pain, fever, dizziness/lightheadedness.

## 2019-05-18 NOTE — ED ADULT NURSE NOTE - CHIEF COMPLAINT QUOTE
Patient c/o Right flank pain with urinary frequency  for the last few days. Denies dysuria fevers and chills.

## 2019-05-18 NOTE — ED ADULT NURSE NOTE - NSIMPLEMENTINTERV_GEN_ALL_ED
Implemented All Universal Safety Interventions:  [place] to call system. Call bell, personal items and telephone within reach. Instruct patient to call for assistance. Room bathroom lighting operational. Non-slip footwear when patient is off stretcher. Physically safe environment: no spills, clutter or unnecessary equipment. Stretcher in lowest position, wheels locked, appropriate side rails in place.

## 2019-05-18 NOTE — ED PROVIDER NOTE - NS ED ROS FT
Eyes:  No visual changes, eye pain or discharge.  ENMT:  no sore throat or runny nose, no difficulty swallowing  Cardiac:  No chest pain  Respiratory:  No cough or respiratory distress.    GI:  No nausea, vomiting, diarrhea + right flank pain, 10/10, sharp, x 2 days, radiating to right abdomen, intermittent.   :  No dysuria, or burning. + urinary frequency, no hematuria   MS:  No joint pain or back pain.  Neuro:  No headache   Skin:  No skin rash.   Endocrine: No history of thyroid disease or diabetes.

## 2019-05-18 NOTE — ED PROVIDER NOTE - PHYSICAL EXAMINATION
CONSTITUTIONAL: Well-developed; well-nourished; in no acute distress.   SKIN: warm, dry  HEAD: Normocephalic; atraumatic.  EYES: PERRL, no conjunctival erythema  ENT: No nasal discharge; airway clear.  NECK: Supple; non tender.  CARD: S1, S2 normal;  Regular rate and rhythm.   RESP: No wheezes, rales or rhonchi.  ABD: soft non tender, non distended, no rebound or guarding + right cva tenderness, + right flank tenderness. no suprapubic tenderness.   EXT: Normal ROM.    LYMPH: No acute cervical adenopathy.  NEURO: Alert, oriented, grossly unremarkable.

## 2019-05-18 NOTE — ED ADULT NURSE NOTE - OBJECTIVE STATEMENT
Patient is a 34 yo female c/o intermittent, sharp right flank pain and urinary frequency x 2 days. Denies fever, N/V/D, hematuria. +chills.

## 2019-05-20 ENCOUNTER — APPOINTMENT (OUTPATIENT)
Dept: OBGYN | Facility: CLINIC | Age: 34
End: 2019-05-20
Payer: MEDICAID

## 2019-05-20 PROCEDURE — 99395 PREV VISIT EST AGE 18-39: CPT

## 2019-05-21 RX ORDER — NITROFURANTOIN MACROCRYSTAL 50 MG
1 CAPSULE ORAL
Qty: 14 | Refills: 0
Start: 2019-05-21 | End: 2019-05-27

## 2019-05-22 ENCOUNTER — APPOINTMENT (OUTPATIENT)
Dept: OBGYN | Facility: CLINIC | Age: 34
End: 2019-05-22
Payer: MEDICAID

## 2019-05-22 PROCEDURE — 76830 TRANSVAGINAL US NON-OB: CPT

## 2019-08-14 ENCOUNTER — FORM ENCOUNTER (OUTPATIENT)
Age: 34
End: 2019-08-14

## 2019-08-15 ENCOUNTER — OUTPATIENT (OUTPATIENT)
Dept: OUTPATIENT SERVICES | Facility: HOSPITAL | Age: 34
LOS: 1 days | Discharge: HOME | End: 2019-08-15
Payer: MEDICAID

## 2019-08-15 DIAGNOSIS — R92.8 OTHER ABNORMAL AND INCONCLUSIVE FINDINGS ON DIAGNOSTIC IMAGING OF BREAST: ICD-10-CM

## 2019-08-15 DIAGNOSIS — J34.2 DEVIATED NASAL SEPTUM: Chronic | ICD-10-CM

## 2019-08-15 DIAGNOSIS — Z98.51 TUBAL LIGATION STATUS: Chronic | ICD-10-CM

## 2019-08-15 DIAGNOSIS — Z90.89 ACQUIRED ABSENCE OF OTHER ORGANS: Chronic | ICD-10-CM

## 2019-08-15 DIAGNOSIS — Z98.890 OTHER SPECIFIED POSTPROCEDURAL STATES: Chronic | ICD-10-CM

## 2019-08-15 PROCEDURE — 76642 ULTRASOUND BREAST LIMITED: CPT | Mod: 26,50

## 2019-08-15 PROCEDURE — G0279: CPT | Mod: 26

## 2019-08-15 PROCEDURE — 77066 DX MAMMO INCL CAD BI: CPT | Mod: 26

## 2019-10-23 ENCOUNTER — APPOINTMENT (OUTPATIENT)
Dept: BREAST CENTER | Facility: CLINIC | Age: 34
End: 2019-10-23
Payer: MEDICAID

## 2019-10-23 ENCOUNTER — OUTPATIENT (OUTPATIENT)
Dept: OUTPATIENT SERVICES | Facility: HOSPITAL | Age: 34
LOS: 1 days | Discharge: HOME | End: 2019-10-23

## 2019-10-23 VITALS
HEIGHT: 64 IN | SYSTOLIC BLOOD PRESSURE: 100 MMHG | BODY MASS INDEX: 23.39 KG/M2 | DIASTOLIC BLOOD PRESSURE: 68 MMHG | TEMPERATURE: 97.6 F | WEIGHT: 137 LBS

## 2019-10-23 DIAGNOSIS — J34.2 DEVIATED NASAL SEPTUM: Chronic | ICD-10-CM

## 2019-10-23 DIAGNOSIS — Z98.51 TUBAL LIGATION STATUS: Chronic | ICD-10-CM

## 2019-10-23 DIAGNOSIS — Z98.890 OTHER SPECIFIED POSTPROCEDURAL STATES: Chronic | ICD-10-CM

## 2019-10-23 DIAGNOSIS — N64.4 MASTODYNIA: ICD-10-CM

## 2019-10-23 DIAGNOSIS — R92.8 OTHER ABNORMAL AND INCONCLUSIVE FINDINGS ON DIAGNOSTIC IMAGING OF BREAST: ICD-10-CM

## 2019-10-23 DIAGNOSIS — Z90.89 ACQUIRED ABSENCE OF OTHER ORGANS: Chronic | ICD-10-CM

## 2019-10-23 PROCEDURE — 99213 OFFICE O/P EST LOW 20 MIN: CPT

## 2019-10-23 RX ORDER — TAMOXIFEN CITRATE 10 MG/1
10 TABLET, FILM COATED ORAL
Qty: 30 | Refills: 2 | Status: ACTIVE | COMMUNITY
Start: 2019-10-23 | End: 1900-01-01

## 2019-10-27 NOTE — PHYSICAL EXAM
[Normocephalic] : normocephalic [Atraumatic] : atraumatic [No Supraclavicular Adenopathy] : no supraclavicular adenopathy [EOMI] : extra ocular movement intact [Examined in the supine and seated position] : examined in the supine and seated position [No Cervical Adenopathy] : no cervical adenopathy [Symmetrical] : symmetrical [No dominant masses] : no dominant masses left breast [No Nipple Retraction] : no left nipple retraction [No Nipple Discharge] : no left nipple discharge [No Axillary Lymphadenopathy] : no left axillary lymphadenopathy [Soft] : abdomen soft [Not Tender] : non-tender [No Edema] : no edema [No Rashes] : no rashes [No Ulceration] : no ulceration [de-identified] : in UOQ, @10:00, 10 cm FN, there is a 1 cm mobile rubbery mass without any overlying skin changes, likely same mass that was visualized on US and mammo, no other suspicious lesions were palpated  [de-identified] : nondiscrete nodularities palpated throughout, but no suspicious masses palpated

## 2019-10-27 NOTE — REVIEW OF SYSTEMS
[As Noted in HPI] : as noted in HPI [Breast Pain] : breast pain [Breast Lump] : breast lump [Negative] : Heme/Lymph [Fever] : no fever [Chills] : no chills [Feeling Poorly] : not feeling poorly [Feeling Tired] : not feeling tired [Pelvic Pain] : no pelvic pain [Abn Vaginal Bleeding] : no unexplained vaginal bleeding [Skin Lesions] : no skin lesions [Skin Wound] : no skin wound [Hot Flashes] : no hot flashes

## 2019-10-27 NOTE — PAST MEDICAL HISTORY
[Menstruating] : The patient is menstruating [Menarche Age ____] : age at menarche was [unfilled] [Definite ___ (Date)] : the last menstrual period was [unfilled] [Normal Amount/Duration] : it was of a normal amount and duration [Regular Cycle Intervals] : have been regular [Total Preg ___] : G[unfilled] [Live Births ___] : P[unfilled]  [Age At Live Birth ___] : Age at live birth: [unfilled] [History of Hormone Replacement Treatment] : has no history of hormone replacement treatment [FreeTextEntry5] : s/p endometrial ablation and tubal ligation in 2016\par scheduled for a R salpingo-oophorectomy for painful ovarian cyst on 9/25/18 [FreeTextEntry6] : denies [FreeTextEntry7] : yes currently  [FreeTextEntry8] : denies

## 2019-10-27 NOTE — HISTORY OF PRESENT ILLNESS
[FreeTextEntry1] : Birdie is a 33F who presents with a self palpated right breast mass. \par \par She first noticed this mass several weeks prior.  She underwent the following diagnostic imaging at that time:\par \par 18 -- b/l dx mammogram and R breast US \par -heterogenously dense breasts \par -R breast mass in UOQ, 1.1 cm\par -no suspicious architectural distortion or calcifications identified \par R breast US \par -@10:00, 10 cm FN, oval circumscribed hypoechoic mass, 1 x 1 x 0.6 cm \par BIRADS 3: rec R dx mammogram and US in 6 months \par \par She also has tenderness in the UOQ in the area of this right breast mass.  She denies any overlying skin changes, fevers or chills, does not think that the mass has changed in size at all and she has not had any nipple discharge b/l or palpated any left sided breast lesions. Of note, she has had her nipple pierced in the past and is a current smoker. She also has polycystic ovarian syndrome and is scheduled for a R salpingo-oophorectomy on 18 for a painful R ovarian cyst.  \par \par HISTORICAL RISK FACTORS: \par -2 prior lumpectomy:\par     @age 17, R inferior lumpectomy -- benign \par     @age 21, L UIQ lumpectomy -- benign \par -family history of breast cancer in two maternal aunts, dx in their 50s \par -, age at first live birth was 22\par -currently on OCPs\par \par RISK ASSESSMENT:\par Betsy \par 5 yr -- 1.3%\par lifetime -- 10.9%\par \par TC v 6 \par 5yr -- 2%\par lifetime -- 12.7%\par \par INTERVAL HISTORY: \par Birdie returns for a 6 month follow up visit for b/l breast pain and a R breast mass @10N10 deemed BIRADS 3 on US.  \par \par She still has breast pain in bilateral breasts, pain scale 8/10.  SHe has since quit smoking and tried supportive bras with little improvement in her breast pain.  \par \par Her most recent imaging was a b/l dx tomosynthesis and b/l US on 8/15/19 which revealed stability of her right breast mass @10N10 measuring 1 x 0.8 x 0.5 cm, deemed BIRADS 3.

## 2019-10-27 NOTE — DATA REVIEWED
[FreeTextEntry1] : EXAM: US BREAST LIMITED BI\par EXAM: MG MAMMO DIAG W KAMILLE BI#\par \par \par PROCEDURE DATE: 08/15/2019\par \par \par \par INTERPRETATION: Clinical History / Reason for exam: Short-term follow-up of\par a probably benign right breast first identified in August 2018. Area of\par palpable concern in the left breast 3 to 4:00 position.\par \par The patient reports her last clinical breast examination was performed\par within the past year.\par \par Family history: Maternal aunt.\par \par Comparisons: Priors dating back to 2018.\par \par Views obtained:Bilateral tomographic full field CC and MLO views. Spot\par compression views of the left breast..\par \par Computer-aided detection was utilized in the interpretation of this\par examination.\par \par Breast composition:The breasts are heterogeneously dense, which may obscure\par small masses.\par \par Findings:\par \par Mammogram:\par \par Linear marker denotes the site of cutaneous scarring in bilateral breasts.\par There is a stable isodense mass in the superior right breast posterior\par depth. No suspicious mass, microcalcifications or areas of architectural\par distortion is seen either breast. When compared to the previous examinations\par there is no significant interval change and nothing to suggest malignancy.\par \par Ultrasound:\par \par Targeted bilateral breast ultrasound was performed.\par \par At the 10:00 position 10 cm from the nipple, there is a circumscribed\par hypoechoic mass measuring 1.0 x 0.8 x 0.5 cm.\par \par No suspicious solid or cystic masses in the left breast 3 to 4:00 position.\par \par Impression: No mammographic or sonographic evidence of malignancy. Stable\par probably benign mass in the right breast upper outer quadrant for which\par short-term mammographic and sonographic follow-up is recommended. No\par sonographic or mammographic correlate to the area of palpable concern in the\par left breast.\par \par Recommendation: Follow-up unilateral diagnostic mammogram and ultrasound in\par 6 months.\par \par BI-RADS category 3: Probably Benign\par \par The above findings and recommendations were discussed with the patient at\par the time of the examination.\par \par \par \par \par \par \par ORIN WESTFALL M.D., ATTENDING RADIOLOGIST\par This document has been electronically signed. Aug 15 2019 4:11PM\par

## 2019-10-27 NOTE — ASSESSMENT
[FreeTextEntry1] : Birdie is a 33F who presents with a right breast mass, also visualized on imaging as a BIRADS 3 lesion. \par \par I was able to palpate a 1 cm R UOQ breast mass that is rubbery in nature and mobile.  In addition, she has a nodule in her left breast @3-4:00, 7-8 cm FN that feels like fibrocystic changes without any US correlate.   Her most recent imaging was a b/l dx tomosynthesis and b/l US on 8/15/19 which revealed stability of her right breast mass @10N10 measuring 1 x 0.8 x 0.5 cm, deemed BIRADS 3. SHe will be due for a R breast US on 2/15/2020.  This will be scheduled for her today.\par \par In regards to her breast pain, it may be related to fibrocystic changes within her breast that are hormonally influenced. We spoke about possible interventions including evening primrose oil, supportive bras, smoking cessation and decreasing caffeine intake.  Although none of these have been consistently proven to improve breast pain, they may be tried.  \par \par Because her pain is unresolving, we have decided to start tamoxifen 10 mg daily.  We discussed the risks with tamoxifen inculding a 1% risk for thromboembolic disease, a 1% risk for uterine cancer and an increased risk for cataracts.  I will have her follow up in 6 weeks for re-evaluation of her breast pain.  \par \par We discussed BIRADS 3 lesions.  These lesions have a 2% chance of harboring malignancy.  There is always an option to obtain a tissue sample with a biopsy to confirm the diagnosis.   The procedure was described in detail including the placement of a tissue marker clip.  The risks of the procedure, including but not limited to bleeding and infection were also explained.  She is not interested in biopsy at this time and agrees to continue with short-term interval imaging.\par \par We also discussed dense breasts.  Increasing breast density has been found to increase ones risk of breast cancer, but at this time, there is no clear indication for additional imaging in this setting, as both US and MRI have not been found to improve survival.  One can consider bilateral screening US.  However, out of 1000 women screened, the use of routine US will only identify an additional 3-5 cancers.  The use of US was found to increase the likelihood of undergoing more imaging and more biopsies.  She does have dense breasts.  We have decided to proceed with screening bilateral breast US at this time.  This will be scheduled with her next screening mammogram.\par \par Based off her family history  her risk assessment is as follows: \par RISK ASSESSMENT:\par Betsy \par 5 yr -- 1.3%\par lifetime -- 10.9%\par \par TC v 6 \par 5yr -- 2%\par lifetime -- 12.7%\par \par This puts her at an average risk for breast cancer.  She should start yearly screening mammogram/US at the age 41 and annually thereafter. \par \par All of her questions were answered.  She knows to call with any further questions or concerns. \par \par PLAN\par -R dx mammogram and US on 2/15/2020\par -start tamoxifen, ordered a CBC abd CMP prior to starting \par -f/up in 6 weeks for re-eval of her breast pain

## 2019-12-04 ENCOUNTER — APPOINTMENT (OUTPATIENT)
Dept: BREAST CENTER | Facility: CLINIC | Age: 34
End: 2019-12-04
Payer: MEDICAID

## 2019-12-04 VITALS
HEIGHT: 64 IN | WEIGHT: 137 LBS | TEMPERATURE: 98.5 F | BODY MASS INDEX: 23.39 KG/M2 | DIASTOLIC BLOOD PRESSURE: 78 MMHG | SYSTOLIC BLOOD PRESSURE: 114 MMHG

## 2019-12-04 DIAGNOSIS — N64.4 MASTODYNIA: ICD-10-CM

## 2019-12-04 PROCEDURE — 99213 OFFICE O/P EST LOW 20 MIN: CPT

## 2019-12-04 RX ORDER — IBUPROFEN 800 MG/1
800 TABLET, FILM COATED ORAL 3 TIMES DAILY
Qty: 42 | Refills: 0 | Status: ACTIVE | COMMUNITY
Start: 2019-12-04 | End: 1900-01-01

## 2019-12-04 NOTE — ASSESSMENT
[FreeTextEntry1] : Birdie is a 33F who presents with a right breast mass, also visualized on imaging as a BIRADS 3 lesion. \par \par I was able to palpate a 1 cm R UOQ breast mass that is rubbery in nature and mobile.  In addition, she has a nodule in her left breast @3-4:00, 7-8 cm FN that feels like fibrocystic changes without any US correlate.   Her most recent imaging was a b/l dx tomosynthesis and b/l US on 8/15/19 which revealed stability of her right breast mass @10N10 measuring 1 x 0.8 x 0.5 cm, deemed BIRADS 3. \par \par Given that her area of pain is predominantly in the R UOQ, I have arranged for her to undergo a R US guided CNBx of her BIRADS 3 R breast mass @10N10.  If this reveals a fibroadenoma, then we discussed surgical excision as it may be one of the reasons for her breast pain. \par \par In the interim, I have given her a prescription for 800 mg of ibuprofen q8hrs prn for pain.  \par \par In regards to her breast pain, it may be related to fibrocystic changes within her breast that are hormonally influenced. We spoke about possible interventions including evening primrose oil, supportive bras, smoking cessation and decreasing caffeine intake.  Although none of these have been consistently proven to improve breast pain, they may be tried.  \par \par \par We also discussed dense breasts.  Increasing breast density has been found to increase ones risk of breast cancer, but at this time, there is no clear indication for additional imaging in this setting, as both US and MRI have not been found to improve survival.  One can consider bilateral screening US.  However, out of 1000 women screened, the use of routine US will only identify an additional 3-5 cancers.  The use of US was found to increase the likelihood of undergoing more imaging and more biopsies.  She does have dense breasts.  We have decided to proceed with screening bilateral breast US at this time.  This will be scheduled with her next screening mammogram.\par \par Based off her family history  her risk assessment is as follows: \par RISK ASSESSMENT:\par Betsy \par 5 yr -- 1.3%\par lifetime -- 10.9%\par \par TC v 6 \par 5yr -- 2%\par lifetime -- 12.7%\par \par This puts her at an average risk for breast cancer.  She should start yearly screening mammogram/US at the age 41 and annually thereafter. \par \par All of her questions were answered.  She knows to call with any further questions or concerns. \par \par PLAN\par -R US CNBx @10N10\par -f/up after biopsy \par -ibuprofen 800 mg prn pain

## 2019-12-04 NOTE — HISTORY OF PRESENT ILLNESS
[FreeTextEntry1] : Birdie is a 33F who presents with a self palpated right breast mass and breast pain\par \par She first noticed this mass several weeks prior.  She underwent the following diagnostic imaging at that time:\par \par 19 -- b/l dx mammogram and R breast US \par -heterogenously dense breasts \par -R breast mass in UOQ, 1.1 cm\par -no suspicious architectural distortion or calcifications identified \par R breast US \par -@10:00, 10 cm FN, oval circumscribed hypoechoic mass, 1 x 1 x 0.6 cm \par BIRADS 3: rec R dx mammogram and US in 6 months \par \par She also has tenderness in the UOQ in the area of this right breast mass.  She denies any overlying skin changes, fevers or chills, does not think that the mass has changed in size at all and she has not had any nipple discharge b/l or palpated any left sided breast lesions. Of note, she has had her nipple pierced in the past and is a current smoker. She also has polycystic ovarian syndrome and is scheduled for a R salpingo-oophorectomy on 18 for a painful R ovarian cyst.  \par \par HISTORICAL RISK FACTORS: \par -2 prior lumpectomy:\par     @age 17, R inferior lumpectomy -- benign \par     @age 21, L UIQ lumpectomy -- benign \par -family history of breast cancer in two maternal aunts, dx in their 50s \par -, age at first live birth was 22\par -currently on OCPs\par \par RISK ASSESSMENT:\par Betsy \par 5 yr -- 1.3%\par lifetime -- 10.9%\par \par TC v 6 \par 5yr -- 2%\par lifetime -- 12.7%\par \par INTERVAL HISTORY: \par Birdie returns for a 6 month follow up visit for b/l breast pain and a R breast mass @10N10 deemed BIRADS 3 on US.  \par \par Her most recent imaging was a b/l dx tomosynthesis and b/l US on 8/15/19 which revealed stability of her right breast mass @10N10 measuring 1 x 0.8 x 0.5 cm, deemed BIRADS 3. \par \par She still has breast pain in bilateral breasts, pain scale 8/10.  SHe has since quit smoking and tried supportive bras with little improvement in her breast pain.  After her last visit, she was started on tamoxifen 10 mg daily x 6 weeks and returns for a short term follow up for re-evaluation of her breast pain. \par \par She stopped taking the tamoxifen after three days because she was having severe mood swings.  She is still having pain, but predominantly in her right UOQ.

## 2019-12-04 NOTE — REVIEW OF SYSTEMS
[As Noted in HPI] : as noted in HPI [Breast Pain] : breast pain [Breast Lump] : breast lump [Negative] : Heme/Lymph [Fever] : no fever [Chills] : no chills [Feeling Tired] : not feeling tired [Feeling Poorly] : not feeling poorly [Pelvic Pain] : no pelvic pain [Skin Wound] : no skin wound [Abn Vaginal Bleeding] : no unexplained vaginal bleeding [Skin Lesions] : no skin lesions [Hot Flashes] : no hot flashes

## 2019-12-04 NOTE — PHYSICAL EXAM
[Normocephalic] : normocephalic [Atraumatic] : atraumatic [EOMI] : extra ocular movement intact [No Supraclavicular Adenopathy] : no supraclavicular adenopathy [No Cervical Adenopathy] : no cervical adenopathy [Examined in the supine and seated position] : examined in the supine and seated position [No dominant masses] : no dominant masses left breast [Symmetrical] : symmetrical [No Nipple Retraction] : no left nipple retraction [No Nipple Discharge] : no left nipple discharge [No Axillary Lymphadenopathy] : no left axillary lymphadenopathy [Soft] : abdomen soft [Not Tender] : non-tender [No Edema] : no edema [No Rashes] : no rashes [No Ulceration] : no ulceration [de-identified] : in UOQ, @10:00, 10 cm FN, there is a 1 cm mobile rubbery mass without any overlying skin changes, likely same mass that was visualized on US and mammo, no other suspicious lesions were palpated  [de-identified] : nondiscrete nodularities palpated throughout, but no suspicious masses palpated

## 2019-12-04 NOTE — PAST MEDICAL HISTORY
[Menstruating] : The patient is menstruating [Menarche Age ____] : age at menarche was [unfilled] [Definite ___ (Date)] : the last menstrual period was [unfilled] [Normal Amount/Duration] : it was of a normal amount and duration [Regular Cycle Intervals] : have been regular [Total Preg ___] : G[unfilled] [Live Births ___] : P[unfilled]  [Age At Live Birth ___] : Age at live birth: [unfilled] [History of Hormone Replacement Treatment] : has no history of hormone replacement treatment [FreeTextEntry5] : s/p endometrial ablation and tubal ligation in 2016\par scheduled for a R salpingo-oophorectomy for painful ovarian cyst on 9/25/18 [FreeTextEntry7] : yes currently  [FreeTextEntry6] : denies [FreeTextEntry8] : denies

## 2019-12-11 ENCOUNTER — FORM ENCOUNTER (OUTPATIENT)
Age: 34
End: 2019-12-11

## 2019-12-12 ENCOUNTER — RESULT REVIEW (OUTPATIENT)
Age: 34
End: 2019-12-12

## 2019-12-12 ENCOUNTER — OUTPATIENT (OUTPATIENT)
Dept: OUTPATIENT SERVICES | Facility: HOSPITAL | Age: 34
LOS: 1 days | Discharge: HOME | End: 2019-12-12
Payer: MEDICAID

## 2019-12-12 DIAGNOSIS — Z98.890 OTHER SPECIFIED POSTPROCEDURAL STATES: Chronic | ICD-10-CM

## 2019-12-12 DIAGNOSIS — Z98.51 TUBAL LIGATION STATUS: Chronic | ICD-10-CM

## 2019-12-12 DIAGNOSIS — J34.2 DEVIATED NASAL SEPTUM: Chronic | ICD-10-CM

## 2019-12-12 DIAGNOSIS — Z90.89 ACQUIRED ABSENCE OF OTHER ORGANS: Chronic | ICD-10-CM

## 2019-12-12 PROCEDURE — 19083 BX BREAST 1ST LESION US IMAG: CPT | Mod: RT

## 2019-12-12 PROCEDURE — 88305 TISSUE EXAM BY PATHOLOGIST: CPT | Mod: 26

## 2019-12-12 PROCEDURE — 77065 DX MAMMO INCL CAD UNI: CPT | Mod: 26,RT

## 2019-12-13 LAB — SURGICAL PATHOLOGY STUDY: SIGNIFICANT CHANGE UP

## 2019-12-18 ENCOUNTER — APPOINTMENT (OUTPATIENT)
Dept: OTOLARYNGOLOGY | Facility: CLINIC | Age: 34
End: 2019-12-18
Payer: MEDICAID

## 2019-12-18 VITALS — BODY MASS INDEX: 23.39 KG/M2 | HEIGHT: 64 IN | WEIGHT: 137 LBS

## 2019-12-18 DIAGNOSIS — N63.10 UNSPECIFIED LUMP IN THE RIGHT BREAST, UNSPECIFIED QUADRANT: ICD-10-CM

## 2019-12-18 DIAGNOSIS — D24.1 BENIGN NEOPLASM OF RIGHT BREAST: ICD-10-CM

## 2019-12-18 DIAGNOSIS — J30.9 ALLERGIC RHINITIS, UNSPECIFIED: ICD-10-CM

## 2019-12-18 PROCEDURE — G0268 REMOVAL OF IMPACTED WAX MD: CPT

## 2019-12-18 PROCEDURE — 92550 TYMPANOMETRY & REFLEX THRESH: CPT

## 2019-12-18 PROCEDURE — 31231 NASAL ENDOSCOPY DX: CPT

## 2019-12-18 PROCEDURE — 92557 COMPREHENSIVE HEARING TEST: CPT

## 2019-12-18 PROCEDURE — 99214 OFFICE O/P EST MOD 30 MIN: CPT | Mod: 25

## 2019-12-18 RX ORDER — FEXOFENADINE HYDROCHLORIDE 180 MG/1
180 TABLET, FILM COATED ORAL DAILY
Qty: 30 | Refills: 0 | Status: ACTIVE | COMMUNITY
Start: 2019-12-18 | End: 1900-01-01

## 2019-12-18 RX ORDER — FLUTICASONE PROPIONATE 50 UG/1
50 SPRAY, METERED NASAL
Qty: 3 | Refills: 2 | Status: ACTIVE | COMMUNITY
Start: 2019-12-18 | End: 1900-01-01

## 2019-12-18 NOTE — PHYSICAL EXAM
[de-identified] : bilateral impacted earwax cleaned with curette [Nasal Endoscopy Performed] : nasal endoscopy was performed, see procedure section for findings [Midline] : trachea located in midline position [Normal] : orientation to person, place, and time: normal

## 2019-12-18 NOTE — REASON FOR VISIT
[Subsequent Evaluation] : a subsequent evaluation for [FreeTextEntry2] : nasal congestion, ear noises

## 2019-12-18 NOTE — HISTORY OF PRESENT ILLNESS
[FreeTextEntry1] : Pt returns to the office as a follow up on nasal congestion and ear noises. She is s/p SMRT, has been feeling great until 2 months ago when she started having postnasal drip, intermittent congestion in both sides of the nose and steady ringing in the ears. \par sense of smell ok.

## 2020-01-20 ENCOUNTER — OUTPATIENT (OUTPATIENT)
Dept: OUTPATIENT SERVICES | Facility: HOSPITAL | Age: 35
LOS: 1 days | Discharge: HOME | End: 2020-01-20

## 2020-01-20 VITALS
SYSTOLIC BLOOD PRESSURE: 108 MMHG | WEIGHT: 136.91 LBS | TEMPERATURE: 98 F | DIASTOLIC BLOOD PRESSURE: 60 MMHG | RESPIRATION RATE: 16 BRPM | OXYGEN SATURATION: 97 % | HEART RATE: 68 BPM | HEIGHT: 65 IN

## 2020-01-20 DIAGNOSIS — Z90.89 ACQUIRED ABSENCE OF OTHER ORGANS: Chronic | ICD-10-CM

## 2020-01-20 DIAGNOSIS — Z98.890 OTHER SPECIFIED POSTPROCEDURAL STATES: Chronic | ICD-10-CM

## 2020-01-20 DIAGNOSIS — R92.8 OTHER ABNORMAL AND INCONCLUSIVE FINDINGS ON DIAGNOSTIC IMAGING OF BREAST: ICD-10-CM

## 2020-01-20 DIAGNOSIS — Z01.818 ENCOUNTER FOR OTHER PREPROCEDURAL EXAMINATION: ICD-10-CM

## 2020-01-20 DIAGNOSIS — Z98.51 TUBAL LIGATION STATUS: Chronic | ICD-10-CM

## 2020-01-20 DIAGNOSIS — J34.2 DEVIATED NASAL SEPTUM: Chronic | ICD-10-CM

## 2020-01-20 LAB
ALBUMIN SERPL ELPH-MCNC: 4.8 G/DL — SIGNIFICANT CHANGE UP (ref 3.5–5.2)
ALP SERPL-CCNC: 54 U/L — SIGNIFICANT CHANGE UP (ref 30–115)
ALT FLD-CCNC: 12 U/L — SIGNIFICANT CHANGE UP (ref 0–41)
ANION GAP SERPL CALC-SCNC: 13 MMOL/L — SIGNIFICANT CHANGE UP (ref 7–14)
APTT BLD: 30.7 SEC — SIGNIFICANT CHANGE UP (ref 27–39.2)
AST SERPL-CCNC: 15 U/L — SIGNIFICANT CHANGE UP (ref 0–41)
BASOPHILS # BLD AUTO: 0.09 K/UL — SIGNIFICANT CHANGE UP (ref 0–0.2)
BASOPHILS NFR BLD AUTO: 1 % — SIGNIFICANT CHANGE UP (ref 0–1)
BILIRUB SERPL-MCNC: 0.3 MG/DL — SIGNIFICANT CHANGE UP (ref 0.2–1.2)
BUN SERPL-MCNC: 9 MG/DL — LOW (ref 10–20)
CALCIUM SERPL-MCNC: 9.8 MG/DL — SIGNIFICANT CHANGE UP (ref 8.5–10.1)
CHLORIDE SERPL-SCNC: 101 MMOL/L — SIGNIFICANT CHANGE UP (ref 98–110)
CO2 SERPL-SCNC: 25 MMOL/L — SIGNIFICANT CHANGE UP (ref 17–32)
CREAT SERPL-MCNC: 0.7 MG/DL — SIGNIFICANT CHANGE UP (ref 0.7–1.5)
EOSINOPHIL # BLD AUTO: 0.26 K/UL — SIGNIFICANT CHANGE UP (ref 0–0.7)
EOSINOPHIL NFR BLD AUTO: 3 % — SIGNIFICANT CHANGE UP (ref 0–8)
GLUCOSE SERPL-MCNC: 93 MG/DL — SIGNIFICANT CHANGE UP (ref 70–99)
HCT VFR BLD CALC: 42.1 % — SIGNIFICANT CHANGE UP (ref 37–47)
HGB BLD-MCNC: 13.7 G/DL — SIGNIFICANT CHANGE UP (ref 12–16)
IMM GRANULOCYTES NFR BLD AUTO: 0.2 % — SIGNIFICANT CHANGE UP (ref 0.1–0.3)
INR BLD: 0.98 RATIO — SIGNIFICANT CHANGE UP (ref 0.65–1.3)
LYMPHOCYTES # BLD AUTO: 3.34 K/UL — SIGNIFICANT CHANGE UP (ref 1.2–3.4)
LYMPHOCYTES # BLD AUTO: 38.9 % — SIGNIFICANT CHANGE UP (ref 20.5–51.1)
MCHC RBC-ENTMCNC: 30.8 PG — SIGNIFICANT CHANGE UP (ref 27–31)
MCHC RBC-ENTMCNC: 32.5 G/DL — SIGNIFICANT CHANGE UP (ref 32–37)
MCV RBC AUTO: 94.6 FL — SIGNIFICANT CHANGE UP (ref 81–99)
MONOCYTES # BLD AUTO: 0.68 K/UL — HIGH (ref 0.1–0.6)
MONOCYTES NFR BLD AUTO: 7.9 % — SIGNIFICANT CHANGE UP (ref 1.7–9.3)
NEUTROPHILS # BLD AUTO: 4.2 K/UL — SIGNIFICANT CHANGE UP (ref 1.4–6.5)
NEUTROPHILS NFR BLD AUTO: 49 % — SIGNIFICANT CHANGE UP (ref 42.2–75.2)
NRBC # BLD: 0 /100 WBCS — SIGNIFICANT CHANGE UP (ref 0–0)
PLATELET # BLD AUTO: 188 K/UL — SIGNIFICANT CHANGE UP (ref 130–400)
POTASSIUM SERPL-MCNC: 4.1 MMOL/L — SIGNIFICANT CHANGE UP (ref 3.5–5)
POTASSIUM SERPL-SCNC: 4.1 MMOL/L — SIGNIFICANT CHANGE UP (ref 3.5–5)
PROT SERPL-MCNC: 7.4 G/DL — SIGNIFICANT CHANGE UP (ref 6–8)
PROTHROM AB SERPL-ACNC: 11.3 SEC — SIGNIFICANT CHANGE UP (ref 9.95–12.87)
RBC # BLD: 4.45 M/UL — SIGNIFICANT CHANGE UP (ref 4.2–5.4)
RBC # FLD: 12.4 % — SIGNIFICANT CHANGE UP (ref 11.5–14.5)
SODIUM SERPL-SCNC: 139 MMOL/L — SIGNIFICANT CHANGE UP (ref 135–146)
WBC # BLD: 8.59 K/UL — SIGNIFICANT CHANGE UP (ref 4.8–10.8)
WBC # FLD AUTO: 8.59 K/UL — SIGNIFICANT CHANGE UP (ref 4.8–10.8)

## 2020-01-20 RX ORDER — NORGESTIMATE AND ETHINYL ESTRADIOL 7DAYSX3 LO
1 KIT ORAL
Qty: 0 | Refills: 0 | DISCHARGE

## 2020-01-20 NOTE — H&P PST ADULT - NSANTHOSAYNRD_GEN_A_CORE
No. KATHIE screening performed.  STOP BANG Legend: 0-2 = LOW Risk; 3-4 = INTERMEDIATE Risk; 5-8 = HIGH Risk

## 2020-01-20 NOTE — H&P PST ADULT - NSICDXFAMILYHX_GEN_ALL_CORE_FT
FAMILY HISTORY:  Mother  Still living? Unknown  Family history of diabetes mellitus, Age at diagnosis: Age Unknown  Family history of heart disease, Age at diagnosis: Age Unknown  Family history of renal disease, Age at diagnosis: Age Unknown    Grandparent  Still living? Unknown  Family history of diabetes mellitus, Age at diagnosis: Age Unknown  Family history of heart disease, Age at diagnosis: Age Unknown

## 2020-01-20 NOTE — H&P PST ADULT - WEIGHT IN LBS
[facility]  eMERGENCY dEPARTMENT eNCOUnter      Pt Name: Jacki Romano  MRN: [id number]  [place] 1971  Date of evaluation: 8/6/2019  Provider: Stanford Dyer PA-C    CHIEF COMPLAINT       Chief Complaint   Patient presents with    Chest Pain         HISTORY OF PRESENT ILLNESS   (Location/Symptom, Timing/Onset,Context/Setting, Quality, Duration, Modifying Factors, Severity)  Note limiting factors. Jacki Romano is a 53 y.o. female who presents to the emergency department presents with chest pain of 2 hours duration left arm numbness and weakness x1 day duration headache for several days abdominal pain nausea vomiting. Patient denies neck pain no new back pain, negative dysuria negative rectal bleeding negative hematemesis. Patient has facial droop right-sided. She notes some green discharge from her left ear few days ago which has not continued. Symptoms moderate severity nothing improves her symptoms nothing worsens her symptoms she is not taking her aspirin as ordered. HPI    NursingNotes were reviewed. REVIEW OF SYSTEMS    (2-9 systems for level 4, 10 or more for level 5)     Review of Systems   Constitutional: Positive for chills. Negative for activity change, appetite change and unexpected weight change. HENT: Positive for ear discharge. Negative for nosebleeds, trouble swallowing and voice change. Eyes: Negative for photophobia and discharge. Respiratory: Negative for apnea, cough and shortness of breath. Cardiovascular: Negative for chest pain. Gastrointestinal: Positive for abdominal pain, nausea and vomiting. Negative for abdominal distention, anal bleeding and blood in stool. Endocrine: Negative for cold intolerance, heat intolerance and polyphagia. Genitourinary: Negative for dysuria and hematuria. Musculoskeletal: Positive for myalgias. Negative for joint swelling, neck pain and neck stiffness. Skin: Negative for pallor.    Allergic/Immunologic: Negative for immunocompromised state. Neurological: Positive for weakness, numbness and headaches. Negative for seizures, facial asymmetry and speech difficulty. Hematological: Does not bruise/bleed easily. Psychiatric/Behavioral: Negative for behavioral problems, self-injury and sleep disturbance. All other systems reviewed and are negative. Except as noted above the remainder of the review of systems was reviewed and negative. PAST MEDICAL HISTORY     Past Medical History:   Diagnosis Date    Anxiety     Back pain     back surgery x 4    Bipolar disorder ([address]     CAD (coronary artery disease)     CAFL (chronic airflow limitation) ([address] 11/3/2016    Cerebral artery occlusion with cerebral infarction (HCC)     tia    Chronic bronchitis (HCC)     Chronic pain     Colitis     DDD (degenerative disc disease), lumbar 2016    Depression     Endometriosis     Excessive caffeine abuse, continuous ([address] 2018    Gastritis     GERD (gastroesophageal reflux disease)     History of myocardial infarction     HTN (hypertension), benign 2016    Hypokalemia     Impaired mobility and activities of daily living     Left hemiparesis ([address]     Multiple sclerosis ([address]     Neck pain     surgery x 1    Over weight     PTSD (post-traumatic stress disorder)     Sensory neuropathy 2016    TIA (transient ischemic attack)     Tobacco abuse     Vasospastic angina ([address] 2016         SURGICALHISTORY       Past Surgical History:   Procedure Laterality Date    BACK SURGERY      x2     SECTION      x2    CHOLECYSTECTOMY  13    Lapchole    CORONARY ANGIOPLASTY      CYST REMOVAL  3/7/16    Dr. Jass Kee Left     UPPER GASTROINTESTINAL ENDOSCOPY  2014    ANIBAL HOUSE M.D.          CURRENT MEDICATIONS       Current Discharge Medication List      CONTINUE these medications days..  Qty: 30 capsule, Refills: 1    Associated Diagnoses: Radiculopathy, cervical region; Degeneration of intervertebral disc of mid-cervical region, unspecified spinal level; Cervical disc disorder with radiculopathy of mid-cervical region; Herniation of intervertebral disc of mid-cervical region, unspecified spinal level      calcium carbonate (TUMS) 500 MG chewable tablet        !! - Potential duplicate medications found. Please discuss with provider. ALLERGIES     Latex; Influenza vaccines; Eggs or egg-derived products; Gabapentin; Pneumococcal vaccines; Povidone iodine; and Varicella virus vaccine live    FAMILY HISTORY       Family History   Problem Relation Age of Onset    High Blood Pressure Mother     Kidney Disease Mother     Lupus Mother     Bipolar Disorder Son           SOCIAL HISTORY       Social History     Socioeconomic History    Marital status:       Spouse name: None    Number of children: None    Years of education: None    Highest education level: None   Occupational History    Occupation: unemployed   Social Needs    Financial resource strain: None    Food insecurity:     Worry: None     Inability: None    Transportation needs:     Medical: None     Non-medical: None   Tobacco Use    Smoking status: Current Some Day Smoker     Packs/day: 0.50     Years: 10.00     Pack years: 5.00     Types: Cigarettes    Smokeless tobacco: Never Used    Tobacco comment: pt trying Chantix   Substance and Sexual Activity    Alcohol use: No    Drug use: No    Sexual activity: Not Currently   Lifestyle    Physical activity:     Days per week: None     Minutes per session: None    Stress: None   Relationships    Social connections:     Talks on phone: None     Gets together: None     Attends [employer] service: None     Active member of club or organization: None     Attends meetings of clubs or organizations: None     Relationship status: None    Intimate partner violence: achievable. XR CHEST PORTABLE    (Results Pending)   Vascular carotid duplex bilateral    (Results Pending)   MRI brain with contrast    (Results Pending)   mra head w/o contrast    (Results Pending)         ED BEDSIDE ULTRASOUND:   Performed by ED Physician - none    LABS:  Labs Reviewed   COMPREHENSIVE METABOLIC PANEL - Abnormal; Notable for the following components:       Result Value    Glucose 128 (*)     CREATININE 0.99 (*)     ALT 58 (*)     AST 41 (*)     All other components within normal limits   CBC WITH AUTO DIFFERENTIAL - Abnormal; Notable for the following components:    MCH 33.1 (*)     Neutrophils # 6.9 (*)     All other components within normal limits   URINE RT REFLEX TO CULTURE - Abnormal; Notable for the following components:    Clarity, UA CLOUDY (*)     Leukocyte Esterase, Urine TRACE (*)     All other components within normal limits   MICROSCOPIC URINALYSIS - Abnormal; Notable for the following components:    Bacteria, UA FEW (*)     WBC, UA 6-10 (*)     All other components within normal limits   URINE CULTURE   URINE DRUG SCREEN   MAGNESIUM   TROPONIN   CK   LIPASE   TROPONIN   TROPONIN       All other labs were within normal range or not returned as of this dictation. EMERGENCY DEPARTMENT COURSE and DIFFERENTIAL DIAGNOSIS/MDM:   Vitals:    Vitals:    08/06/19 2121 08/06/19 2200 08/06/19 2244 08/07/19 0005   BP: (!) 152/82 (!) 159/84 (!) 144/79 (!) 151/66   Pulse: 78 82 79 64   Resp: 20  20 16   Temp:    97.9 °F (36.6 °C)   TempSrc:    Oral   SpO2: 99%  99% 97%   Weight:       Height:                MDM  Number of Diagnoses or Management Options  Cerebrovascular accident (CVA), unspecified mechanism ([facility]):   Chest pain, unspecified type:   Diagnosis management comments: NIH stroke scale performed TPA not appropriate secondary to timeframe of greater than 4 hours per symptoms initial presentation including 1 day history of weakness numbness left arm.   EKG appears to have changed from 11/6/2018. Discussed with Dr. Mello Barros, including patient's presentation timeframe with headache left arm numbness weakness facial droop of greater than 1 day duration. Aspirin and nitro given no further orders. Discussed with Dr. Phyllis Leggett regarding admission including patient presentation discussion with neurology. He admits patient. Amount and/or Complexity of Data Reviewed  Clinical lab tests: reviewed and ordered  Tests in the radiology section of CPT®: ordered            CONSULTS:  IP CONSULT TO NEUROLOGY  IP CONSULT TO HOSPITALIST  IP CONSULT TO CASE MANAGEMENT  IP CONSULT TO DIETITIAN  IP CONSULT TO SOCIAL WORK  IP CONSULT TO NEUROLOGY  IP CONSULT TO CARDIOLOGY    PROCEDURES:  Unless otherwise noted below, none     Procedures    FINAL IMPRESSION      1.  Chest pain, unspecified type          DISPOSITION/PLAN   DISPOSITION        PATIENT REFERRED TO:  Lorraine Escobar MD  [address] [place] [address] , [address] [facility] [phone]            DISCHARGE MEDICATIONS:  Current Discharge Medication List             (Please note that portions of this note were completed with a voice recognition program.  Efforts were made to edit the dictations but occasionally words are mis-transcribed.)    Juno Anthony PA-C (electronically signed)  Attending Emergency Physician       Juno Anthony PA-C  08/07/19 [phone]

## 2020-01-20 NOTE — H&P PST ADULT - HISTORY OF PRESENT ILLNESS
34yr old female states found a lump in RT BREAST- BIOPSY CONFIRMED BENIGN-NON-MALIGNANT PRESENTS TO PRETESTING FOR RIGHT BREAST WIDE LOCAL EXCISION WITH RADIOFREQUENCY LOCALIZER. DENIES C/O CP, PALP, SOB URI, FEVER, RASH OR UTI SYMPTOMS. Exercise ricky 4-5 FOS.

## 2020-01-20 NOTE — H&P PST ADULT - NSICDXPASTSURGICALHX_GEN_ALL_CORE_FT
PAST SURGICAL HISTORY:  Deviated septum and adnoids removed 2017    H/O lumpectomy left breast 2001  right breast 2007    History of tonsillectomy 1998    History of tubal ligation 2016    Status post endometrial ablation 2016

## 2020-01-23 ENCOUNTER — INPATIENT (INPATIENT)
Facility: HOSPITAL | Age: 35
LOS: 0 days | Discharge: HOME | End: 2020-01-24
Attending: INTERNAL MEDICINE | Admitting: INTERNAL MEDICINE
Payer: MEDICAID

## 2020-01-23 VITALS
DIASTOLIC BLOOD PRESSURE: 67 MMHG | WEIGHT: 136.91 LBS | RESPIRATION RATE: 16 BRPM | TEMPERATURE: 98 F | HEART RATE: 89 BPM | HEIGHT: 65 IN | SYSTOLIC BLOOD PRESSURE: 129 MMHG | OXYGEN SATURATION: 99 %

## 2020-01-23 DIAGNOSIS — J34.2 DEVIATED NASAL SEPTUM: Chronic | ICD-10-CM

## 2020-01-23 DIAGNOSIS — Z98.890 OTHER SPECIFIED POSTPROCEDURAL STATES: Chronic | ICD-10-CM

## 2020-01-23 DIAGNOSIS — Z98.51 TUBAL LIGATION STATUS: Chronic | ICD-10-CM

## 2020-01-23 DIAGNOSIS — Z90.89 ACQUIRED ABSENCE OF OTHER ORGANS: Chronic | ICD-10-CM

## 2020-01-23 LAB
ANION GAP SERPL CALC-SCNC: 13 MMOL/L — SIGNIFICANT CHANGE UP (ref 7–14)
BASOPHILS # BLD AUTO: 0.03 K/UL — SIGNIFICANT CHANGE UP (ref 0–0.2)
BASOPHILS NFR BLD AUTO: 0.6 % — SIGNIFICANT CHANGE UP (ref 0–1)
BUN SERPL-MCNC: 11 MG/DL — SIGNIFICANT CHANGE UP (ref 10–20)
CALCIUM SERPL-MCNC: 9.4 MG/DL — SIGNIFICANT CHANGE UP (ref 8.5–10.1)
CHLORIDE SERPL-SCNC: 99 MMOL/L — SIGNIFICANT CHANGE UP (ref 98–110)
CO2 SERPL-SCNC: 25 MMOL/L — SIGNIFICANT CHANGE UP (ref 17–32)
CREAT SERPL-MCNC: 0.8 MG/DL — SIGNIFICANT CHANGE UP (ref 0.7–1.5)
EOSINOPHIL # BLD AUTO: 0.02 K/UL — SIGNIFICANT CHANGE UP (ref 0–0.7)
EOSINOPHIL NFR BLD AUTO: 0.4 % — SIGNIFICANT CHANGE UP (ref 0–8)
FLU A RESULT: POSITIVE
FLU A RESULT: POSITIVE
FLUAV AG NPH QL: POSITIVE
FLUBV AG NPH QL: NEGATIVE — SIGNIFICANT CHANGE UP
GLUCOSE SERPL-MCNC: 100 MG/DL — HIGH (ref 70–99)
HCG SERPL QL: NEGATIVE — SIGNIFICANT CHANGE UP
HCT VFR BLD CALC: 38.4 % — SIGNIFICANT CHANGE UP (ref 37–47)
HGB BLD-MCNC: 12.7 G/DL — SIGNIFICANT CHANGE UP (ref 12–16)
IMM GRANULOCYTES NFR BLD AUTO: 0.4 % — HIGH (ref 0.1–0.3)
LYMPHOCYTES # BLD AUTO: 1.23 K/UL — SIGNIFICANT CHANGE UP (ref 1.2–3.4)
LYMPHOCYTES # BLD AUTO: 23.7 % — SIGNIFICANT CHANGE UP (ref 20.5–51.1)
MCHC RBC-ENTMCNC: 31.2 PG — HIGH (ref 27–31)
MCHC RBC-ENTMCNC: 33.1 G/DL — SIGNIFICANT CHANGE UP (ref 32–37)
MCV RBC AUTO: 94.3 FL — SIGNIFICANT CHANGE UP (ref 81–99)
MONOCYTES # BLD AUTO: 0.79 K/UL — HIGH (ref 0.1–0.6)
MONOCYTES NFR BLD AUTO: 15.2 % — HIGH (ref 1.7–9.3)
NEUTROPHILS # BLD AUTO: 3.11 K/UL — SIGNIFICANT CHANGE UP (ref 1.4–6.5)
NEUTROPHILS NFR BLD AUTO: 59.7 % — SIGNIFICANT CHANGE UP (ref 42.2–75.2)
NRBC # BLD: 0 /100 WBCS — SIGNIFICANT CHANGE UP (ref 0–0)
PLATELET # BLD AUTO: 127 K/UL — LOW (ref 130–400)
POTASSIUM SERPL-MCNC: 4 MMOL/L — SIGNIFICANT CHANGE UP (ref 3.5–5)
POTASSIUM SERPL-SCNC: 4 MMOL/L — SIGNIFICANT CHANGE UP (ref 3.5–5)
RBC # BLD: 4.07 M/UL — LOW (ref 4.2–5.4)
RBC # FLD: 12.6 % — SIGNIFICANT CHANGE UP (ref 11.5–14.5)
RSV RESULT: NEGATIVE — SIGNIFICANT CHANGE UP
RSV RNA RESP QL NAA+PROBE: NEGATIVE — SIGNIFICANT CHANGE UP
SODIUM SERPL-SCNC: 137 MMOL/L — SIGNIFICANT CHANGE UP (ref 135–146)
TROPONIN T SERPL-MCNC: <0.01 NG/ML — SIGNIFICANT CHANGE UP
WBC # BLD: 5.2 K/UL — SIGNIFICANT CHANGE UP (ref 4.8–10.8)
WBC # FLD AUTO: 5.2 K/UL — SIGNIFICANT CHANGE UP (ref 4.8–10.8)

## 2020-01-23 PROCEDURE — 70450 CT HEAD/BRAIN W/O DYE: CPT | Mod: 26,59

## 2020-01-23 PROCEDURE — 71045 X-RAY EXAM CHEST 1 VIEW: CPT | Mod: 26

## 2020-01-23 PROCEDURE — 99285 EMERGENCY DEPT VISIT HI MDM: CPT

## 2020-01-23 PROCEDURE — 70496 CT ANGIOGRAPHY HEAD: CPT | Mod: 26

## 2020-01-23 PROCEDURE — 93010 ELECTROCARDIOGRAM REPORT: CPT

## 2020-01-23 PROCEDURE — 70498 CT ANGIOGRAPHY NECK: CPT | Mod: 26

## 2020-01-23 PROCEDURE — 73562 X-RAY EXAM OF KNEE 3: CPT | Mod: 26,RT

## 2020-01-23 RX ORDER — KETOROLAC TROMETHAMINE 30 MG/ML
15 SYRINGE (ML) INJECTION ONCE
Refills: 0 | Status: DISCONTINUED | OUTPATIENT
Start: 2020-01-23 | End: 2020-01-23

## 2020-01-23 RX ORDER — ACETAMINOPHEN 500 MG
650 TABLET ORAL EVERY 6 HOURS
Refills: 0 | Status: DISCONTINUED | OUTPATIENT
Start: 2020-01-23 | End: 2020-01-24

## 2020-01-23 RX ORDER — ENOXAPARIN SODIUM 100 MG/ML
40 INJECTION SUBCUTANEOUS DAILY
Refills: 0 | Status: DISCONTINUED | OUTPATIENT
Start: 2020-01-23 | End: 2020-01-24

## 2020-01-23 RX ORDER — ACETAMINOPHEN 500 MG
650 TABLET ORAL EVERY 4 HOURS
Refills: 0 | Status: DISCONTINUED | OUTPATIENT
Start: 2020-01-23 | End: 2020-01-24

## 2020-01-23 RX ORDER — SODIUM CHLORIDE 9 MG/ML
1000 INJECTION INTRAMUSCULAR; INTRAVENOUS; SUBCUTANEOUS
Refills: 0 | Status: DISCONTINUED | OUTPATIENT
Start: 2020-01-23 | End: 2020-01-24

## 2020-01-23 RX ORDER — IBUPROFEN 200 MG
600 TABLET ORAL EVERY 6 HOURS
Refills: 0 | Status: DISCONTINUED | OUTPATIENT
Start: 2020-01-23 | End: 2020-01-24

## 2020-01-23 RX ORDER — IPRATROPIUM/ALBUTEROL SULFATE 18-103MCG
3 AEROSOL WITH ADAPTER (GRAM) INHALATION EVERY 6 HOURS
Refills: 0 | Status: DISCONTINUED | OUTPATIENT
Start: 2020-01-23 | End: 2020-01-24

## 2020-01-23 RX ORDER — MECLIZINE HCL 12.5 MG
25 TABLET ORAL EVERY 8 HOURS
Refills: 0 | Status: DISCONTINUED | OUTPATIENT
Start: 2020-01-23 | End: 2020-01-24

## 2020-01-23 RX ORDER — SODIUM CHLORIDE 9 MG/ML
1000 INJECTION, SOLUTION INTRAVENOUS ONCE
Refills: 0 | Status: COMPLETED | OUTPATIENT
Start: 2020-01-23 | End: 2020-01-23

## 2020-01-23 RX ADMIN — Medication 75 MILLIGRAM(S): at 22:33

## 2020-01-23 RX ADMIN — Medication 15 MILLIGRAM(S): at 17:58

## 2020-01-23 RX ADMIN — SODIUM CHLORIDE 1000 MILLILITER(S): 9 INJECTION, SOLUTION INTRAVENOUS at 17:58

## 2020-01-23 RX ADMIN — Medication 25 MILLIGRAM(S): at 22:33

## 2020-01-23 NOTE — ED PROVIDER NOTE - PHYSICAL EXAMINATION
CONSTITUTIONAL: Well-developed; well-nourished; in no acute distress.   SKIN: warm, dry  HEAD: Normocephalic; atraumatic.  EYES: PERRL, EOMI, no conjunctival erythema  ENT: No nasal discharge; airway clear.  NECK: Supple; non tender.  CARD: S1, S2 normal; no murmurs, gallops, or rubs. Regular rate and rhythm.   RESP: No wheezes, rales or rhonchi.  ABD: soft ntnd  EXT: Normal ROM.  No clubbing, cyanosis or edema. Ecchymosis to the medial aspect of R. knee. Ecchymosis to inferior aspect of B/L chin.  LYMPH: No acute cervical adenopathy.  NEURO: Alert, oriented. CN II - XII intact. No dysdiadochokinesia. Sensation grossly intact. Ataxic gait.  PSYCH: Cooperative, appropriate.

## 2020-01-23 NOTE — H&P ADULT - NSHPREVIEWOFSYSTEMS_GEN_ALL_CORE
REVIEW OF SYSTEMS:    CONSTITUTIONAL: No weakness, fevers or chills  EYES/ENT: No visual changes;  No vertigo or throat pain   NECK: No pain or stiffness  RESPIRATORY: + cough, no wheezing, hemoptysis; No shortness of breath  CARDIOVASCULAR: No chest pain or palpitations  GASTROINTESTINAL: No abdominal or epigastric pain. No nausea, vomiting, or hematemesis; No diarrhea or constipation. No melena or hematochezia.  GENITOURINARY: No dysuria, frequency or hematuria  NEUROLOGICAL: No numbness or weakness  SKIN: No itching, rashes

## 2020-01-23 NOTE — ED PROVIDER NOTE - ATTENDING CONTRIBUTION TO CARE
33 yo F no pmh presents with syncopal episode. States that yesterday she started to develop URi symptoms. + non productive cough, chills, nasal congestion. no fevers. This mornign when in the bathroom she got dizzy felt like her eyes went black and then passed out. Fell forward and hit her chin. Helped by family and then had a similar episode without a fall. States that since she has felt unsteady all day, unable to ambulate without assistance. Feels like she is going to fall down. no cp, no sob, no abdominal pain, no n/v/d, no numbness, tingling or weakness. no headache.     CONSTITUTIONAL: Well-developed; well-nourished; in no acute distress.   SKIN: warm, dry  HEAD: Normocephalic; atraumatic.  EYES: PERRL, EOMI, no conjunctival erythema  ENT: No nasal discharge; airway clear.  NECK: Supple; non tender.  CARD: S1, S2 normal;  Regular rate and rhythm.   RESP: No wheezes, rales or rhonchi.  ABD: soft non tender, non distended, no rebound or guarding  EXT: Normal ROM.    LYMPH: No acute cervical adenopathy.  NEURO: Alert, oriented, grossly unremarkable. CN 2-11 intact, 5/5 strength in all 4 extremities, equal sensation bilaterally   PSYCH: Cooperative, appropriate.

## 2020-01-23 NOTE — ED PROVIDER NOTE - CLINICAL SUMMARY MEDICAL DECISION MAKING FREE TEXT BOX
Patient presents with syncopal episodes, dizziness and unsteady gait. labs, ekg, cxr, ct done. Discussed with jason richardson from neurology who recommends cta and admission for further evaluation. Discussed with mar who will follow up cta. Patient admitted for further management.

## 2020-01-23 NOTE — H&P ADULT - HISTORY OF PRESENT ILLNESS
[34 year old  woman]    CC: Dizziness    PMH: Denies     Past Surgical History: Bilateral lumpectomy with benign pathology, endometrial ablation for hemorrhage, bilateral tubal ligation, nasal septum deviation repair     History of Present Illness goes back to the past few days when the patient developed a viral illness with a productive cough, rhinorrhea and fatigue. On the day of presentation while the patient was turning a corner at home she had sudden onset dizziness described as room spinning sensation which caused her to fall anteriorly, hit her knee and chin and lost consciousness. She does not recall how much time she was unconscious however her nephew woke her up upon which she was not confused and denied loss of urine, stool or tongue biting. She Since then she's had a constant lightheadedness sensation, blurry vision and inability to ambulate due to loss of balance. She had one more episode of vertigo after which she "collapsed" on the bed at which point she decided to present to the ED.       She denies fevers or chills. She denies  appetite or  PO intake. She denies hearing loss, ear pain, discharge. She denies ringing in her ears. She endorses that her mother had strokes in her 40s. She denies palpitations, chest pain or shortness of breath. She denies extremity weakness.     In the ED, vitals were stable. Labs did not show any acute abnormalities. Sent for the flu which was positive for flu A.

## 2020-01-23 NOTE — ED PROVIDER NOTE - OBJECTIVE STATEMENT
34y F w/ no sig PMH presents with dizziness that started upon waking up this morning. States she feels unsteady on feet. Worse with standing up and with motion. Improved with laying down. Was going to the restroom tonight and fell. Unwitnessed fall. Fell face flat. Unknown if she hit head. + LOC. Complaining of R. knee pain and continued dizziness. States has had cough and nasal congestion since yesterday as well. Denies fever, chills, CP, SOB, abd pain, n/v, or numbness/tingling.

## 2020-01-23 NOTE — H&P ADULT - NSHPLABSRESULTS_GEN_ALL_CORE
===================== LABS =====================                        12.7   5.20  )-----------( 127      ( 23 Jan 2020 18:10 )             38.4     01-23    137  |  99  |  11  ----------------------------<  100<H>  4.0   |  25  |  0.8    Ca    9.4      23 Jan 2020 18:10    Troponin T, Serum: <0.01 ng/mL (01-23-20 @ 18:56)    CARDIAC MARKERS ( 23 Jan 2020 18:56 )  x     / <0.01 ng/mL / x     / x     / x      ================== IMAGING ==================  < from: CT Head No Cont (01.23.20 @ 18:44) >    IMPRESSION:     No acute intracranial hemorrhage or mass effect.    ================== EKG ==================    < from: 12 Lead ECG (01.23.20 @ 16:40) >    Diagnosis Line Normal sinus rhythm  Normal ECG

## 2020-01-23 NOTE — ED ADULT NURSE NOTE - PAIN RATING/NUMBER SCALE (0-10): ACTIVITY
"H & P as per ER:  "Nelly Tian is a 69 y.o. female with Hx of recurrent skin infections who presents to the ED with c/o left-sided abdominal pain from cellulitis since this morning. Pt c/o chills, tremors, cough for 2 weeks, and congestion. She has a temp of 101.5 °F at time of arrival. The patient denies N/V/D, urinary symptoms, or any other symptoms at this time. Pt reports of smoking . PMHx of DM, COPD, HTN, CHF, and septic shock. PSHx includes , oophorectomy, and hysterectomy."    Patient seen in ER for complaints of shakes and fever. Started this morning. Tried taking tylenol for this but symptoms didn't improve. Was recently seen by her PCP (Dr. Bird) on  for abdomnial cellulitis. Was started on DURICEF but symptoms have not improved. Also complains of a cough with yellow phlegm since getting the flu shot about 3 weeks ago. Feels congested.  While in ER WBC was elevated at 20.1. Lactate was within normal limits. CXR showed no acute findings.   " 7

## 2020-01-23 NOTE — H&P ADULT - ATTENDING COMMENTS
I have seen and evaluated and examined the patient today. I agree with the resident's documentation set forth

## 2020-01-23 NOTE — ED PROVIDER NOTE - NS ED ATTENDING STATEMENT MOD
Dr Del Martinez f/u
I have personally seen and examined this patient.  I have fully participated in the care of this patient. I have reviewed all pertinent clinical information, including history, physical exam, plan and the Resident’s note and agree except as noted.

## 2020-01-23 NOTE — H&P ADULT - NSHPPHYSICALEXAM_GEN_ALL_CORE
=================== OBJECTIVE ===================    VITAL SIGNS: Last 24 Hours  T(C): 37 (23 Jan 2020 20:56), Max: 37 (23 Jan 2020 20:56)  T(F): 98.6 (23 Jan 2020 20:56), Max: 98.6 (23 Jan 2020 20:56)  HR: 62 (23 Jan 2020 20:56) (62 - 89)  BP: 110/69 (23 Jan 2020 20:56) (110/69 - 129/67)  BP(mean): --  RR: 18 (23 Jan 2020 20:56) (16 - 18)  SpO2: 100% (23 Jan 2020 20:56) (99% - 100%)    PHYSICAL EXAM:  GENERAL: NAD, well-developed, sniffing   HEAD:  Atraumatic, Normocephalic. No ear erythema or pain   EYES: EOMI, PERRLA, conjunctiva and sclera clear. No nystagmus   CHEST/LUNG: Mild diffuse bilateral expiratory wheezing   HEART: Regular rate and rhythm; No murmurs, rubs, or gallops  ABDOMEN: Soft, Nontender, Nondistended; Bowel sounds present  EXTREMITIES:  2+ Peripheral Pulses, No clubbing, cyanosis, or edema  PSYCH: AAOx3  NEUROLOGY: non-focal.   All extremity strength 5/5  LE symmetrical sensation to pin prick, vibration and light touch   SKIN: No rashes or lesions

## 2020-01-23 NOTE — H&P ADULT - ASSESSMENT
================= ASSESSMENT/PLAN ==================  Patient is a 34y old Female who presents with a chief complaint of Currently admitted to medicine with the primary diagnosis of Dizziness    # Vertigo   # Acute Bronchitis   # Flu A infection   Likely vestibular neuritis secondary to flu A   Unlikely CVA although in the differential   Seizure unlikely given history and no prior history   History also is not consistent with cardiac origin   PLAN  - Start meclizine   - Start Tamiflu   - Nebs given wheezing   - ED consulted neurology. Monitor for further recommendations   - Zofran as needed   - Pain medication as needed   - MIVFs  - Isolation precautions     GI PPX: Not indicated   DVT PPX: Lovenox     DIET: Regular   ACTIVITY:  () Ad Lisa  /  (X) Advance as Tolerated  /  () Bed Rest  /  () Fall Precaution  /  () Seizure precaution    ================= DISPOSITION ==================    Patient to be discharged when condition(s) optimized.            Discharge to: Home when cleared             Home services:              Counseled on/Discussed cessation of:  () Risky behaviors  /  () Smoking cessation  /  () Diet  /  () Exercise  /  () Other    ================= CODE STATUS =================                  (X) FULL CODE     |     () DNR     |     () DNI    () Discussion with patient and/or family regarding goals of care

## 2020-01-23 NOTE — ED PROVIDER NOTE - NS ED ROS FT
Eyes:  No visual changes, eye pain or discharge.  ENMT:  No hearing changes, pain, no sore throat or runny nose, no difficulty swallowing  Cardiac:  No chest pain, SOB or edema. No chest pain with exertion.  Respiratory:  No cough or respiratory distress. No hemoptysis. No history of asthma or RAD.  GI:  No nausea, vomiting, diarrhea or abdominal pain.  :  No dysuria, frequency or burning.  MS:  No myalgia, muscle weakness, or back pain. + joint pain  Neuro:  No headache or weakness.  + LOC. + dizziness  Skin:  No skin rash.   Endocrine: No history of thyroid disease or diabetes.

## 2020-01-24 ENCOUNTER — TRANSCRIPTION ENCOUNTER (OUTPATIENT)
Age: 35
End: 2020-01-24

## 2020-01-24 VITALS
SYSTOLIC BLOOD PRESSURE: 104 MMHG | HEART RATE: 71 BPM | TEMPERATURE: 97 F | RESPIRATION RATE: 18 BRPM | OXYGEN SATURATION: 100 % | DIASTOLIC BLOOD PRESSURE: 54 MMHG

## 2020-01-24 PROCEDURE — 99234 HOSP IP/OBS SM DT SF/LOW 45: CPT

## 2020-01-24 RX ORDER — MECLIZINE HCL 12.5 MG
1 TABLET ORAL
Qty: 12 | Refills: 0
Start: 2020-01-24 | End: 2020-01-27

## 2020-01-24 RX ADMIN — Medication 600 MILLIGRAM(S): at 05:23

## 2020-01-24 RX ADMIN — Medication 75 MILLIGRAM(S): at 05:23

## 2020-01-24 RX ADMIN — Medication 25 MILLIGRAM(S): at 05:23

## 2020-01-24 NOTE — DISCHARGE NOTE PROVIDER - NSDCMRMEDTOKEN_GEN_ALL_CORE_FT
meclizine 25 mg oral tablet: 1 tab(s) orally every 8 hours, As Needed -for dizziness   oseltamivir 75 mg oral capsule: 1 cap(s) orally 2 times a day

## 2020-01-24 NOTE — DISCHARGE NOTE PROVIDER - CARE PROVIDER_API CALL
Ayush Rosas (DO)  Internal Medicine  [address] [place] [address]  Phone: [phone]  Fax: [phone]  Follow Up Time: 1 month

## 2020-01-24 NOTE — DISCHARGE NOTE PROVIDER - NSDCFUSCHEDAPPT_GEN_ALL_CORE_FT
SIMONE HOLDEN ; 01/31/2020 ; [facility] [address] SIMONE Morrow ; 02/10/2020 ; [facility] [address] SIMONE HOLDEN ; 01/31/2020 ; [facility] [address] SIMONE Morrow ; 02/10/2020 ; [facility] [address]

## 2020-01-24 NOTE — DISCHARGE NOTE PROVIDER - HOSPITAL COURSE
34 F w PMHx Bilateral lumpectomy with benign pathology, endometrial ablation for hemorrhage, bilateral tubal ligation, nasal septum deviation repair who had URTI symptoms a few days ago with stuffy nose, and malaise, then came back to hospital with dizziness; she had even fallen onto her face she stated.       In the ED, vitals were stable. Labs did not show any acute abnormalities. Sent for the flu which was positive for flu A. (23 Jan 2020 22:04).     She was diagnosed with vestibular neuritis secondary to influenza and improved on tamiflu and meclizine prn.  will dc home today.        PHYSICAL EXAMINATION:        GENERAL:  Awake, Alert & Oriented 3/3, No acute distress    HEENT:  Pupils equally round and reactive to light, EOMI    NECK: supple, no palpable cervical LAD    CARDIAC: regular rate & rhythm, S1 & S2 audible, no m/r/g    PULMONARY: lungs are bilaterally clear to auscultation, no tachypnea or wheezing    ABDOMEN:  protuberant, non-distended, non-tender, BS normoactive, no palpable masses or organomegaly    MUSCULOSKELETAL:  no gross appearing joint abnormalities, with no gross bony deformities.     EXTREMITIES:  No edema, peripheral pulses are present with intact radial 2+ and dorsalis pedis pulses 2+    NEUROLOGIC:  Awake, alert, and responds appropriately. Intact 5/5 strength in upper and lower extremities, with intact sensation as well in upper and lower extremities.  CN II-XII grossly intact.     SKIN: no gross erythema or rashes or ecchymoses    PSYCH: normal mood and affect, appropriate            A/P: Vestibular neuritis 2/2 influenza    DC home on PO tamiflu course and meclizine prn 34 F w PMHx Bilateral lumpectomy with benign pathology, endometrial ablation for hemorrhage, bilateral tubal ligation, nasal septum deviation repair who had URTI symptoms a few days ago with stuffy nose, and malaise, then came back to hospital with dizziness; she had even fallen onto her face she stated.       In the ED, vitals were stable. Labs did not show any acute abnormalities. Sent for the flu which was positive for flu A. (23 Jan 2020 22:04).     She was diagnosed with vestibular neuritis secondary to influenza and improved on tamiflu and meclizine prn.  will dc home today.        CT HEAD AND NECK ANGIOGRAPHY WAS DONE AND SHOWED INCIDENTAL FINDING OF 1cm AVM, clinically felt to be unrelated to her presentation. She can follow up with PCP for this.        PHYSICAL EXAMINATION:        GENERAL:  Awake, Alert & Oriented 3/3, No acute distress    HEENT:  Pupils equally round and reactive to light, EOMI    NECK: supple, no palpable cervical LAD    CARDIAC: regular rate & rhythm, S1 & S2 audible, no m/r/g    PULMONARY: lungs are bilaterally clear to auscultation, no tachypnea or wheezing    ABDOMEN:  protuberant, non-distended, non-tender, BS normoactive, no palpable masses or organomegaly    MUSCULOSKELETAL:  no gross appearing joint abnormalities, with no gross bony deformities.     EXTREMITIES:  No edema, peripheral pulses are present with intact radial 2+ and dorsalis pedis pulses 2+    NEUROLOGIC:  Awake, alert, and responds appropriately. Intact 5/5 strength in upper and lower extremities, with intact sensation as well in upper and lower extremities.  CN II-XII grossly intact.     SKIN: no gross erythema or rashes or ecchymoses    PSYCH: normal mood and affect, appropriate            A/P: Vestibular neuritis 2/2 influenza    DC home on PO tamiflu course and meclizine prn

## 2020-01-24 NOTE — DISCHARGE NOTE PROVIDER - NSDCCPCAREPLAN_GEN_ALL_CORE_FT
PRINCIPAL DISCHARGE DIAGNOSIS  Diagnosis: Dizziness  Assessment and Plan of Treatment: Vestibular Neuritis secondary to Influenza Infection: continue tamiflu prescription and use antivert pills as needed      SECONDARY DISCHARGE DIAGNOSES  Diagnosis: Syncope  Assessment and Plan of Treatment:

## 2020-01-24 NOTE — DISCHARGE NOTE NURSING/CASE MANAGEMENT/SOCIAL WORK - PATIENT PORTAL LINK FT
You can access the FollowMyHealth Patient Portal offered by [facility] by registering at the following website: [email or web address]. By joining [employer]’s FollowMyHealth portal, you will also be able to view your health information using other applications (apps) compatible with our system.

## 2020-01-28 ENCOUNTER — FORM ENCOUNTER (OUTPATIENT)
Age: 35
End: 2020-01-28

## 2020-01-28 DIAGNOSIS — Q27.39 ARTERIOVENOUS MALFORMATION, OTHER SITE: ICD-10-CM

## 2020-01-28 DIAGNOSIS — Z91.81 HISTORY OF FALLING: ICD-10-CM

## 2020-01-28 DIAGNOSIS — R42 DIZZINESS AND GIDDINESS: ICD-10-CM

## 2020-01-28 DIAGNOSIS — R26.81 UNSTEADINESS ON FEET: ICD-10-CM

## 2020-01-28 DIAGNOSIS — J20.9 ACUTE BRONCHITIS, UNSPECIFIED: ICD-10-CM

## 2020-01-28 DIAGNOSIS — J10.1 INFLUENZA DUE TO OTHER IDENTIFIED INFLUENZA VIRUS WITH OTHER RESPIRATORY MANIFESTATIONS: ICD-10-CM

## 2020-01-28 DIAGNOSIS — E28.2 POLYCYSTIC OVARIAN SYNDROME: ICD-10-CM

## 2020-01-28 DIAGNOSIS — H81.20 VESTIBULAR NEURONITIS, UNSPECIFIED EAR: ICD-10-CM

## 2020-01-29 ENCOUNTER — OUTPATIENT (OUTPATIENT)
Dept: OUTPATIENT SERVICES | Facility: HOSPITAL | Age: 35
LOS: 1 days | Discharge: HOME | End: 2020-01-29
Payer: MEDICAID

## 2020-01-29 DIAGNOSIS — Z98.51 TUBAL LIGATION STATUS: Chronic | ICD-10-CM

## 2020-01-29 DIAGNOSIS — Z98.890 OTHER SPECIFIED POSTPROCEDURAL STATES: Chronic | ICD-10-CM

## 2020-01-29 DIAGNOSIS — Z90.89 ACQUIRED ABSENCE OF OTHER ORGANS: Chronic | ICD-10-CM

## 2020-01-29 DIAGNOSIS — J34.2 DEVIATED NASAL SEPTUM: Chronic | ICD-10-CM

## 2020-01-29 PROCEDURE — 77065 DX MAMMO INCL CAD UNI: CPT | Mod: 26,RT

## 2020-01-29 PROCEDURE — 19285 PERQ DEV BREAST 1ST US IMAG: CPT | Mod: RT

## 2020-01-30 NOTE — ASU PATIENT PROFILE, ADULT - PMH
Migraines    Polycystic ovaries Flu  went to er january 23, 2020  Migraines    Polycystic ovaries Bronchitis  diagnose january 23, 2020  Flu  went to er january 23, 2020  Migraines    Polycystic ovaries    Vertigo Bronchitis  diagnose january 23, 2020  Fall  s/p fall January 23, 2020 went to ed, ct scan negative  Flu  went to er january 23, 2020  Migraines    Polycystic ovaries    Vertigo

## 2020-01-31 ENCOUNTER — APPOINTMENT (OUTPATIENT)
Dept: BREAST CENTER | Facility: AMBULATORY SURGERY CENTER | Age: 35
End: 2020-01-31
Payer: MEDICAID

## 2020-01-31 ENCOUNTER — OUTPATIENT (OUTPATIENT)
Dept: OUTPATIENT SERVICES | Facility: HOSPITAL | Age: 35
LOS: 1 days | Discharge: HOME | End: 2020-01-31
Payer: COMMERCIAL

## 2020-01-31 ENCOUNTER — RESULT REVIEW (OUTPATIENT)
Age: 35
End: 2020-01-31

## 2020-01-31 VITALS
RESPIRATION RATE: 20 BRPM | HEART RATE: 63 BPM | OXYGEN SATURATION: 98 % | TEMPERATURE: 99 F | WEIGHT: 136.91 LBS | SYSTOLIC BLOOD PRESSURE: 114 MMHG | DIASTOLIC BLOOD PRESSURE: 57 MMHG | HEIGHT: 65 IN

## 2020-01-31 VITALS
RESPIRATION RATE: 16 BRPM | HEART RATE: 55 BPM | OXYGEN SATURATION: 100 % | SYSTOLIC BLOOD PRESSURE: 97 MMHG | DIASTOLIC BLOOD PRESSURE: 57 MMHG

## 2020-01-31 DIAGNOSIS — Z98.890 OTHER SPECIFIED POSTPROCEDURAL STATES: Chronic | ICD-10-CM

## 2020-01-31 DIAGNOSIS — Z90.89 ACQUIRED ABSENCE OF OTHER ORGANS: Chronic | ICD-10-CM

## 2020-01-31 DIAGNOSIS — Z98.51 TUBAL LIGATION STATUS: Chronic | ICD-10-CM

## 2020-01-31 DIAGNOSIS — J34.2 DEVIATED NASAL SEPTUM: Chronic | ICD-10-CM

## 2020-01-31 PROCEDURE — 88305 TISSUE EXAM BY PATHOLOGIST: CPT | Mod: 26

## 2020-01-31 PROCEDURE — 19120 REMOVAL OF BREAST LESION: CPT | Mod: RT

## 2020-01-31 RX ORDER — OXYCODONE HYDROCHLORIDE 5 MG/1
5 TABLET ORAL ONCE
Refills: 0 | Status: DISCONTINUED | OUTPATIENT
Start: 2020-01-31 | End: 2020-01-31

## 2020-01-31 RX ORDER — IBUPROFEN 200 MG
1 TABLET ORAL
Qty: 15 | Refills: 0
Start: 2020-01-31 | End: 2020-02-04

## 2020-01-31 RX ORDER — HYDROMORPHONE HYDROCHLORIDE 2 MG/ML
0.5 INJECTION INTRAMUSCULAR; INTRAVENOUS; SUBCUTANEOUS
Refills: 0 | Status: DISCONTINUED | OUTPATIENT
Start: 2020-01-31 | End: 2020-01-31

## 2020-01-31 RX ORDER — ONDANSETRON 8 MG/1
4 TABLET, FILM COATED ORAL ONCE
Refills: 0 | Status: DISCONTINUED | OUTPATIENT
Start: 2020-01-31 | End: 2020-02-17

## 2020-01-31 RX ORDER — SODIUM CHLORIDE 9 MG/ML
1000 INJECTION, SOLUTION INTRAVENOUS
Refills: 0 | Status: DISCONTINUED | OUTPATIENT
Start: 2020-01-31 | End: 2020-02-17

## 2020-01-31 RX ADMIN — SODIUM CHLORIDE 100 MILLILITER(S): 9 INJECTION, SOLUTION INTRAVENOUS at 12:00

## 2020-01-31 NOTE — ASU DISCHARGE PLAN (ADULT/PEDIATRIC) - CARE PROVIDER_API CALL
Rosemary Estrada (MD)  Surgery  [facility] [address] [place] [address]  Phone: [phone]  Fax: [phone]  Follow Up Time: 2 weeks

## 2020-01-31 NOTE — CHART NOTE - NSCHARTNOTEFT_GEN_A_CORE
PACU ANESTHESIA ADMISSION NOTE      Procedure: Right breast lumpectomy    Post op diagnosis:  Fibroadenoma, right      ____  Intubated  TV:______       Rate: ______      FiO2: ______    _x___  Patent Airway    _x___  Full return of protective reflexes    _x___  Full recovery from anesthesia / back to baseline status    Vitals:            T:  97.7              BP :99/50               R: 18             Sat:  99            P:54      Mental Status:  _x___ Awake   _____ Alert   _____ Drowsy   _____ Sedated    Nausea/Vomiting:  _x___  NO       ______Yes,   See Post - Op Orders         Pain Scale (0-10):  __0___    Treatment: _x___ None    ____ See Post - Op/PCA Orders    Post - Operative Fluids:   __x__ Oral   ____ See Post - Op Orders    Plan: Discharge:   _x___Home       _____Floor     _____Critical Care    _____  Other:_________________    Comments:  No anesthesia issues or complications noted.  Discharge when criteria met.

## 2020-02-04 DIAGNOSIS — N63.10 UNSPECIFIED LUMP IN THE RIGHT BREAST, UNSPECIFIED QUADRANT: ICD-10-CM

## 2020-02-06 LAB — SURGICAL PATHOLOGY STUDY: SIGNIFICANT CHANGE UP

## 2020-02-08 DIAGNOSIS — D24.1 BENIGN NEOPLASM OF RIGHT BREAST: ICD-10-CM

## 2020-02-08 DIAGNOSIS — N60.21 FIBROADENOSIS OF RIGHT BREAST: ICD-10-CM

## 2020-02-10 ENCOUNTER — APPOINTMENT (OUTPATIENT)
Dept: BREAST CENTER | Facility: CLINIC | Age: 35
End: 2020-02-10
Payer: MEDICAID

## 2020-02-10 DIAGNOSIS — D24.1 BENIGN NEOPLASM OF RIGHT BREAST: ICD-10-CM
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
## 2020-02-10 PROBLEM — R42 DIZZINESS AND GIDDINESS: Chronic | Status: ACTIVE | Noted: 2020-01-31

## 2020-02-10 PROBLEM — J11.1 INFLUENZA DUE TO UNIDENTIFIED INFLUENZA VIRUS WITH OTHER RESPIRATORY MANIFESTATIONS: Chronic | Status: ACTIVE | Noted: 2020-01-31

## 2020-02-10 PROBLEM — J40 BRONCHITIS, NOT SPECIFIED AS ACUTE OR CHRONIC: Chronic | Status: ACTIVE | Noted: 2020-01-31

## 2020-02-10 PROCEDURE — 99024 POSTOP FOLLOW-UP VISIT: CPT

## 2020-02-10 NOTE — PHYSICAL EXAM
[Normocephalic] : normocephalic [Atraumatic] : atraumatic [No Supraclavicular Adenopathy] : no supraclavicular adenopathy [EOMI] : extra ocular movement intact [No Cervical Adenopathy] : no cervical adenopathy [Examined in the supine and seated position] : examined in the supine and seated position [Symmetrical] : symmetrical [No dominant masses] : no dominant masses left breast [No Nipple Retraction] : no left nipple retraction [No Nipple Discharge] : no left nipple discharge [Soft] : abdomen soft [No Axillary Lymphadenopathy] : no left axillary lymphadenopathy [Not Tender] : non-tender [No Rashes] : no rashes [No Edema] : no edema [No Ulceration] : no ulceration [de-identified] : in UOQ, surgical incision is healing well without any signs of erythema, induration or infection  [de-identified] : nondiscrete nodularities palpated throughout, but no suspicious masses palpated

## 2020-02-10 NOTE — HISTORY OF PRESENT ILLNESS
[FreeTextEntry1] : Birdie is a 34F who presents with a self palpated right breast mass and breast pain, biopsy proven fibroadenoma, s/p R WLE on 2020. \par \par 2020 -- R WLE \par -fibroadenoma, PASH \par \par She first noticed this mass several weeks prior.  She underwent the following diagnostic imaging at that time:\par \par 18 -- b/l dx mammogram and R breast US \par -heterogenously dense breasts \par -R breast mass in UOQ, 1.1 cm\par -no suspicious architectural distortion or calcifications identified \par R breast US \par -@10:00, 10 cm FN, oval circumscribed hypoechoic mass, 1 x 1 x 0.6 cm \par BIRADS 3: rec R dx mammogram and US in 6 months \par \par She also has tenderness in the UOQ in the area of this right breast mass.  She denies any overlying skin changes, fevers or chills, does not think that the mass has changed in size at all and she has not had any nipple discharge b/l or palpated any left sided breast lesions. Of note, she has had her nipple pierced in the past and is a current smoker. She also has polycystic ovarian syndrome and is scheduled for a R salpingo-oophorectomy on 18 for a painful R ovarian cyst.  \par \par HISTORICAL RISK FACTORS: \par -2 prior lumpectomy:\par     @age 17, R inferior lumpectomy -- benign \par     @age 21, L UIQ lumpectomy -- benign \par -family history of breast cancer in two maternal aunts, dx in their 50s \par -, age at first live birth was 22\par -currently on OCPs\par \par RISK ASSESSMENT:\par Betsy \par 5 yr -- 1.3%\par lifetime -- 10.9%\par \par TC v 6 \par 5yr -- 2%\par lifetime -- 12.7%\par \par INTERVAL HISTORY: \par Birdie returns for her FIRST FOLLOW UP visit for b/l breast pain and a R breast mass @10N10 which was biopsy proven fibroadenoma.  \par \par She underwent a R WLE of this fibroadenoma on 2020.  Her final pathology revealed a fibroadenoma.  She is healing well from her surgery.  She denies any pain, and denies any fevers, chills or drainage. \par \par AS REVIEW:\par Her most recent imaging was a b/l dx tomosynthesis and b/l US on 8/15/19 which revealed stability of her right breast mass @10N10 measuring 1 x 0.8 x 0.5 cm, deemed BIRADS 3. \par \par 2020 -- R US guided bx @10N10\par -fibroadenoma with PASH \par -proliferative type FC changes \par \par She still has breast pain in bilateral breasts, pain scale 8/10.  SHe has since quit smoking and tried supportive bras with little improvement in her breast pain.  After her last visit, she was started on tamoxifen 10 mg daily x 6 weeks and returns for a short term follow up for re-evaluation of her breast pain. \par \par She stopped taking the tamoxifen after three days because she was having severe mood swings.  She is still having pain, but predominantly in her right UOQ.

## 2020-02-10 NOTE — REVIEW OF SYSTEMS
[As Noted in HPI] : as noted in HPI [Negative] : Heme/Lymph [Fever] : no fever [Chills] : no chills [Pelvic Pain] : no pelvic pain [Feeling Tired] : not feeling tired [Feeling Poorly] : not feeling poorly [Skin Lesions] : no skin lesions [Abn Vaginal Bleeding] : no unexplained vaginal bleeding [Skin Wound] : skin wound [Breast Pain] : no breast pain [Breast Lump] : no breast lump [Hot Flashes] : no hot flashes

## 2020-02-10 NOTE — ASSESSMENT
[FreeTextEntry1] : Birdie is a 34F who presents with a right breast fibroadenoma, s/p R WLE on 1/31/2020.\par \par 1/31/2020 -- R WLE \par -fibroadenoma \par -PASH\par \par She is healing well from her recent surgery.  She denies any pain in that area and has no other breast complaints.  She will be due for a R dx mammogram and US on 7/31/2020. THis will be scheduled for her today.  I will have her follow up after for a CBE.  \par \par AS REVIEW: \par In regards to her breast pain, it may be related to fibrocystic changes within her breast that are hormonally influenced. We spoke about possible interventions including evening primrose oil, supportive bras, smoking cessation and decreasing caffeine intake.  Although none of these have been consistently proven to improve breast pain, they may be tried.  \par \par \par We also discussed dense breasts.  Increasing breast density has been found to increase ones risk of breast cancer, but at this time, there is no clear indication for additional imaging in this setting, as both US and MRI have not been found to improve survival.  One can consider bilateral screening US.  However, out of 1000 women screened, the use of routine US will only identify an additional 3-5 cancers.  The use of US was found to increase the likelihood of undergoing more imaging and more biopsies.  She does have dense breasts.  We have decided to proceed with screening bilateral breast US at this time.  This will be scheduled with her next screening mammogram.\par \par Based off her family history  her risk assessment is as follows: \par RISK ASSESSMENT:\par Betsy \par 5 yr -- 1.3%\par lifetime -- 10.9%\par \par TC v 6 \par 5yr -- 2%\par lifetime -- 12.7%\par \par This puts her at an average risk for breast cancer.  She should start yearly screening mammogram/US at the age 40 and annually thereafter. \par \par All of her questions were answered.  She knows to call with any further questions or concerns. \par \par PLAN\par -R dx mammogram and US on 7/31/2020 \par -f/up after

## 2020-02-10 NOTE — DATA REVIEWED
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, sclerosing adenosis, and epithelial\par microcalcifications (complex fibroadenoma).\par - Adjacent benign fibrofatty breast tissue with proliferative\par type fibrocystic changes associated with\par microcalcifications.\par \par Verified by: Antwon Perales M.D.\par (Electronic Signature)\par Reported on: 02/06/20 14:26 EST, [address] [place] [address]\par Phone: [phone]   Fax: [phone]\par _________________________________________________________________\par \par Clinical History\par Right breast wide local excision with radio frequency localizer\par \par Specimen(s) Submitted\par Right breast mass\par \par Gross Description\par The specimen is received fresh, labeled "right breast mass radio\par frequency localizer" and consists of two unoriented fibroadipose\par tissue masses weighing 5 gm in aggregate and measures 3.5 x 2.5 x\par 1.0 cm and 2 x 0.6 x 0.6 cm. The external surface is inked black.\par Serial section reveals yellow white tan cut surface. A clip is\par identified. The specimen is submitted entirely. (7 blocks)\par \par Specimen was received and underwent gross examination at [facility], [address] [place] [address].\par \par 02/03/20 07:43 mt\par \par Perioperative Diagnosis\par Right breast wide local excision, right breast fibroadenoma\par

## 2020-02-18 ENCOUNTER — APPOINTMENT (OUTPATIENT)
Dept: OBGYN | Facility: CLINIC | Age: 35
End: 2020-02-18
Payer: MEDICAID

## 2020-02-18 PROCEDURE — 99212 OFFICE O/P EST SF 10 MIN: CPT

## 2020-03-04 ENCOUNTER — APPOINTMENT (OUTPATIENT)
Dept: OBGYN | Facility: CLINIC | Age: 35
End: 2020-03-04
Payer: MEDICAID

## 2020-03-04 PROCEDURE — 93975 VASCULAR STUDY: CPT

## 2020-03-04 PROCEDURE — 76830 TRANSVAGINAL US NON-OB: CPT

## 2020-03-26 LAB
ALBUMIN SERPL ELPH-MCNC: 4.6 G/DL
ALP BLD-CCNC: 56 U/L
ALT SERPL-CCNC: 9 U/L
ANION GAP SERPL CALC-SCNC: 14 MMOL/L
AST SERPL-CCNC: 13 U/L
BASOPHILS # BLD AUTO: 0.09 K/UL
BASOPHILS NFR BLD AUTO: 1.1 %
BILIRUB SERPL-MCNC: 0.3 MG/DL
BUN SERPL-MCNC: 7 MG/DL
CALCIUM SERPL-MCNC: 9.5 MG/DL
CHLORIDE SERPL-SCNC: 100 MMOL/L
CO2 SERPL-SCNC: 27 MMOL/L
CREAT SERPL-MCNC: 0.5 MG/DL
EOSINOPHIL # BLD AUTO: 0.25 K/UL
EOSINOPHIL NFR BLD AUTO: 3.1 %
GLUCOSE SERPL-MCNC: 75 MG/DL
HCT VFR BLD CALC: 37.4 %
HGB BLD-MCNC: 12.6 G/DL
IMM GRANULOCYTES NFR BLD AUTO: 0.2 %
LYMPHOCYTES # BLD AUTO: 2.48 K/UL
LYMPHOCYTES NFR BLD AUTO: 30.7 %
MAN DIFF?: NORMAL
MCHC RBC-ENTMCNC: 32.6 PG
MCHC RBC-ENTMCNC: 33.7 G/DL
MCV RBC AUTO: 96.6 FL
MONOCYTES # BLD AUTO: 0.63 K/UL
MONOCYTES NFR BLD AUTO: 7.8 %
NEUTROPHILS # BLD AUTO: 4.61 K/UL
NEUTROPHILS NFR BLD AUTO: 57.1 %
PLATELET # BLD AUTO: 177 K/UL
POTASSIUM SERPL-SCNC: 4.2 MMOL/L
PROT SERPL-MCNC: 6.9 G/DL
RBC # BLD: 3.87 M/UL
RBC # FLD: 12.6 %
SODIUM SERPL-SCNC: 141 MMOL/L
WBC # FLD AUTO: 8.08 K/UL

## 2020-05-07 ENCOUNTER — APPOINTMENT (OUTPATIENT)
Dept: OBGYN | Facility: CLINIC | Age: 35
End: 2020-05-07
Payer: MEDICAID

## 2020-05-07 PROCEDURE — 76830 TRANSVAGINAL US NON-OB: CPT

## 2020-05-19 ENCOUNTER — APPOINTMENT (OUTPATIENT)
Dept: OBGYN | Facility: CLINIC | Age: 35
End: 2020-05-19
Payer: MEDICAID

## 2020-05-19 PROCEDURE — 99213 OFFICE O/P EST LOW 20 MIN: CPT

## 2020-07-29 ENCOUNTER — APPOINTMENT (OUTPATIENT)
Dept: BREAST CENTER | Facility: CLINIC | Age: 35
End: 2020-07-29

## 2020-07-31 ENCOUNTER — RESULT REVIEW (OUTPATIENT)
Age: 35
End: 2020-07-31

## 2020-07-31 ENCOUNTER — OUTPATIENT (OUTPATIENT)
Dept: OUTPATIENT SERVICES | Facility: HOSPITAL | Age: 35
LOS: 1 days | Discharge: HOME | End: 2020-07-31
Payer: MEDICAID

## 2020-07-31 DIAGNOSIS — Z98.890 OTHER SPECIFIED POSTPROCEDURAL STATES: Chronic | ICD-10-CM

## 2020-07-31 DIAGNOSIS — Z98.51 TUBAL LIGATION STATUS: Chronic | ICD-10-CM

## 2020-07-31 DIAGNOSIS — R92.8 OTHER ABNORMAL AND INCONCLUSIVE FINDINGS ON DIAGNOSTIC IMAGING OF BREAST: ICD-10-CM

## 2020-07-31 DIAGNOSIS — Z90.89 ACQUIRED ABSENCE OF OTHER ORGANS: Chronic | ICD-10-CM

## 2020-07-31 DIAGNOSIS — J34.2 DEVIATED NASAL SEPTUM: Chronic | ICD-10-CM

## 2020-07-31 PROCEDURE — 77066 DX MAMMO INCL CAD BI: CPT | Mod: 26

## 2020-07-31 PROCEDURE — G0279: CPT | Mod: 26

## 2020-08-01 ENCOUNTER — OUTPATIENT (OUTPATIENT)
Dept: OUTPATIENT SERVICES | Facility: HOSPITAL | Age: 35
LOS: 1 days | Discharge: HOME | End: 2020-08-01
Payer: MEDICAID

## 2020-08-01 ENCOUNTER — RESULT REVIEW (OUTPATIENT)
Age: 35
End: 2020-08-01

## 2020-08-01 DIAGNOSIS — Z90.89 ACQUIRED ABSENCE OF OTHER ORGANS: Chronic | ICD-10-CM

## 2020-08-01 DIAGNOSIS — J34.2 DEVIATED NASAL SEPTUM: Chronic | ICD-10-CM

## 2020-08-01 DIAGNOSIS — Z98.890 OTHER SPECIFIED POSTPROCEDURAL STATES: Chronic | ICD-10-CM

## 2020-08-01 DIAGNOSIS — R92.8 OTHER ABNORMAL AND INCONCLUSIVE FINDINGS ON DIAGNOSTIC IMAGING OF BREAST: ICD-10-CM

## 2020-08-01 DIAGNOSIS — Z98.51 TUBAL LIGATION STATUS: Chronic | ICD-10-CM

## 2020-08-01 PROCEDURE — 76641 ULTRASOUND BREAST COMPLETE: CPT | Mod: 26,50

## 2020-08-17 ENCOUNTER — TRANSCRIPTION ENCOUNTER (OUTPATIENT)
Age: 35
End: 2020-08-17

## 2020-08-24 ENCOUNTER — EMERGENCY (EMERGENCY)
Facility: HOSPITAL | Age: 35
LOS: 0 days | Discharge: HOME | End: 2020-08-24
Attending: EMERGENCY MEDICINE | Admitting: EMERGENCY MEDICINE
Payer: MEDICAID

## 2020-08-24 VITALS
DIASTOLIC BLOOD PRESSURE: 69 MMHG | TEMPERATURE: 98 F | OXYGEN SATURATION: 99 % | SYSTOLIC BLOOD PRESSURE: 140 MMHG | HEART RATE: 94 BPM | RESPIRATION RATE: 17 BRPM

## 2020-08-24 DIAGNOSIS — Z91.013 ALLERGY TO SEAFOOD: ICD-10-CM

## 2020-08-24 DIAGNOSIS — Z90.89 ACQUIRED ABSENCE OF OTHER ORGANS: Chronic | ICD-10-CM

## 2020-08-24 DIAGNOSIS — N93.9 ABNORMAL UTERINE AND VAGINAL BLEEDING, UNSPECIFIED: ICD-10-CM

## 2020-08-24 DIAGNOSIS — F17.200 NICOTINE DEPENDENCE, UNSPECIFIED, UNCOMPLICATED: ICD-10-CM

## 2020-08-24 DIAGNOSIS — Z98.890 OTHER SPECIFIED POSTPROCEDURAL STATES: Chronic | ICD-10-CM

## 2020-08-24 DIAGNOSIS — Z98.890 OTHER SPECIFIED POSTPROCEDURAL STATES: ICD-10-CM

## 2020-08-24 DIAGNOSIS — R10.30 LOWER ABDOMINAL PAIN, UNSPECIFIED: ICD-10-CM

## 2020-08-24 DIAGNOSIS — Z00.00 ENCOUNTER FOR GENERAL ADULT MEDICAL EXAMINATION WITHOUT ABNORMAL FINDINGS: ICD-10-CM

## 2020-08-24 DIAGNOSIS — Z98.51 TUBAL LIGATION STATUS: Chronic | ICD-10-CM

## 2020-08-24 DIAGNOSIS — J34.2 DEVIATED NASAL SEPTUM: Chronic | ICD-10-CM

## 2020-08-24 LAB
ALBUMIN SERPL ELPH-MCNC: 4.6 G/DL — SIGNIFICANT CHANGE UP (ref 3.5–5.2)
ALP SERPL-CCNC: 38 U/L — SIGNIFICANT CHANGE UP (ref 30–115)
ALT FLD-CCNC: 21 U/L — SIGNIFICANT CHANGE UP (ref 0–41)
ANION GAP SERPL CALC-SCNC: 10 MMOL/L — SIGNIFICANT CHANGE UP (ref 7–14)
APPEARANCE UR: CLEAR — SIGNIFICANT CHANGE UP
AST SERPL-CCNC: 19 U/L — SIGNIFICANT CHANGE UP (ref 0–41)
BACTERIA # UR AUTO: ABNORMAL
BASOPHILS # BLD AUTO: 0.06 K/UL — SIGNIFICANT CHANGE UP (ref 0–0.2)
BASOPHILS NFR BLD AUTO: 1 % — SIGNIFICANT CHANGE UP (ref 0–1)
BILIRUB SERPL-MCNC: 0.3 MG/DL — SIGNIFICANT CHANGE UP (ref 0.2–1.2)
BILIRUB UR-MCNC: NEGATIVE — SIGNIFICANT CHANGE UP
BUN SERPL-MCNC: 10 MG/DL — SIGNIFICANT CHANGE UP (ref 10–20)
CALCIUM SERPL-MCNC: 9.6 MG/DL — SIGNIFICANT CHANGE UP (ref 8.5–10.1)
CHLORIDE SERPL-SCNC: 105 MMOL/L — SIGNIFICANT CHANGE UP (ref 98–110)
CO2 SERPL-SCNC: 24 MMOL/L — SIGNIFICANT CHANGE UP (ref 17–32)
COLOR SPEC: YELLOW — SIGNIFICANT CHANGE UP
CREAT SERPL-MCNC: 0.7 MG/DL — SIGNIFICANT CHANGE UP (ref 0.7–1.5)
DIFF PNL FLD: ABNORMAL
EOSINOPHIL # BLD AUTO: 0.14 K/UL — SIGNIFICANT CHANGE UP (ref 0–0.7)
EOSINOPHIL NFR BLD AUTO: 2.2 % — SIGNIFICANT CHANGE UP (ref 0–8)
EPI CELLS # UR: 11 /HPF — HIGH (ref 0–5)
GLUCOSE SERPL-MCNC: 92 MG/DL — SIGNIFICANT CHANGE UP (ref 70–99)
GLUCOSE UR QL: NEGATIVE — SIGNIFICANT CHANGE UP
HCT VFR BLD CALC: 42.5 % — SIGNIFICANT CHANGE UP (ref 37–47)
HGB BLD-MCNC: 14 G/DL — SIGNIFICANT CHANGE UP (ref 12–16)
HYALINE CASTS # UR AUTO: 2 /LPF — SIGNIFICANT CHANGE UP (ref 0–7)
IMM GRANULOCYTES NFR BLD AUTO: 0.3 % — SIGNIFICANT CHANGE UP (ref 0.1–0.3)
KETONES UR-MCNC: NEGATIVE — SIGNIFICANT CHANGE UP
LACTATE SERPL-SCNC: 1.3 MMOL/L — SIGNIFICANT CHANGE UP (ref 0.7–2)
LEUKOCYTE ESTERASE UR-ACNC: NEGATIVE — SIGNIFICANT CHANGE UP
LIDOCAIN IGE QN: 24 U/L — SIGNIFICANT CHANGE UP (ref 7–60)
LYMPHOCYTES # BLD AUTO: 2.29 K/UL — SIGNIFICANT CHANGE UP (ref 1.2–3.4)
LYMPHOCYTES # BLD AUTO: 36.6 % — SIGNIFICANT CHANGE UP (ref 20.5–51.1)
MCHC RBC-ENTMCNC: 31.3 PG — HIGH (ref 27–31)
MCHC RBC-ENTMCNC: 32.9 G/DL — SIGNIFICANT CHANGE UP (ref 32–37)
MCV RBC AUTO: 95.1 FL — SIGNIFICANT CHANGE UP (ref 81–99)
MONOCYTES # BLD AUTO: 0.4 K/UL — SIGNIFICANT CHANGE UP (ref 0.1–0.6)
MONOCYTES NFR BLD AUTO: 6.4 % — SIGNIFICANT CHANGE UP (ref 1.7–9.3)
NEUTROPHILS # BLD AUTO: 3.35 K/UL — SIGNIFICANT CHANGE UP (ref 1.4–6.5)
NEUTROPHILS NFR BLD AUTO: 53.5 % — SIGNIFICANT CHANGE UP (ref 42.2–75.2)
NITRITE UR-MCNC: NEGATIVE — SIGNIFICANT CHANGE UP
NRBC # BLD: 0 /100 WBCS — SIGNIFICANT CHANGE UP (ref 0–0)
PH UR: 6.5 — SIGNIFICANT CHANGE UP (ref 5–8)
PLATELET # BLD AUTO: 178 K/UL — SIGNIFICANT CHANGE UP (ref 130–400)
POTASSIUM SERPL-MCNC: 4.6 MMOL/L — SIGNIFICANT CHANGE UP (ref 3.5–5)
POTASSIUM SERPL-SCNC: 4.6 MMOL/L — SIGNIFICANT CHANGE UP (ref 3.5–5)
PROT SERPL-MCNC: 7.1 G/DL — SIGNIFICANT CHANGE UP (ref 6–8)
PROT UR-MCNC: SIGNIFICANT CHANGE UP
RBC # BLD: 4.47 M/UL — SIGNIFICANT CHANGE UP (ref 4.2–5.4)
RBC # FLD: 13 % — SIGNIFICANT CHANGE UP (ref 11.5–14.5)
RBC CASTS # UR COMP ASSIST: 5 /HPF — HIGH (ref 0–4)
SODIUM SERPL-SCNC: 139 MMOL/L — SIGNIFICANT CHANGE UP (ref 135–146)
SP GR SPEC: 1.02 — SIGNIFICANT CHANGE UP (ref 1.01–1.02)
UROBILINOGEN FLD QL: SIGNIFICANT CHANGE UP
WBC # BLD: 6.26 K/UL — SIGNIFICANT CHANGE UP (ref 4.8–10.8)
WBC # FLD AUTO: 6.26 K/UL — SIGNIFICANT CHANGE UP (ref 4.8–10.8)
WBC UR QL: 2 /HPF — SIGNIFICANT CHANGE UP (ref 0–5)

## 2020-08-24 PROCEDURE — 99285 EMERGENCY DEPT VISIT HI MDM: CPT

## 2020-08-24 PROCEDURE — 76830 TRANSVAGINAL US NON-OB: CPT | Mod: 26

## 2020-08-24 RX ORDER — MORPHINE SULFATE 50 MG/1
4 CAPSULE, EXTENDED RELEASE ORAL ONCE
Refills: 0 | Status: DISCONTINUED | OUTPATIENT
Start: 2020-08-24 | End: 2020-08-24

## 2020-08-24 RX ORDER — ONDANSETRON 8 MG/1
4 TABLET, FILM COATED ORAL ONCE
Refills: 0 | Status: COMPLETED | OUTPATIENT
Start: 2020-08-24 | End: 2020-08-24

## 2020-08-24 RX ORDER — SODIUM CHLORIDE 9 MG/ML
1000 INJECTION INTRAMUSCULAR; INTRAVENOUS; SUBCUTANEOUS ONCE
Refills: 0 | Status: COMPLETED | OUTPATIENT
Start: 2020-08-24 | End: 2020-08-24

## 2020-08-24 RX ADMIN — SODIUM CHLORIDE 1000 MILLILITER(S): 9 INJECTION INTRAMUSCULAR; INTRAVENOUS; SUBCUTANEOUS at 13:54

## 2020-08-24 NOTE — ED PROVIDER NOTE - OBJECTIVE STATEMENT
35 y/o female with hx PCOS, s/p tubal ligation, endometrial ablation, presents to the ED c/o "I was having heavy periods and my GYN put me on Norethidione. I haven't had my period in 3 months. Yesterday I was at work and felt a pop on the right side and I had some pinkish fluid with nausea. 33 y/o female with hx PCOS, s/p tubal ligation, endometrial ablation, presents to the ED c/o "I was having heavy periods and my GYN put me on Norethidione. I haven't had my period in 3 months. Yesterday I was at work and felt a pop on the right side and I had some pinkish fluid with nausea." no vomit/ fever/ chills/ diarrhea

## 2020-08-24 NOTE — ED PROVIDER NOTE - CARE PROVIDER_API CALL
Joe Sommer  OBSTETRICS AND GYNECOLOGY  [address] [place] [address]  Phone: [phone]  Fax: [phone]  Follow Up Time:

## 2020-08-24 NOTE — ED PROVIDER NOTE - CARE PLAN
Principal Discharge DX:	Abdominal pain Principal Discharge DX:	Vaginal bleeding  Secondary Diagnosis:	Abdominal pain

## 2020-08-24 NOTE — ED ADULT TRIAGE NOTE - CHIEF COMPLAINT QUOTE
pt here to be eval for possible cyst rupture on ovary. pt noted with lower abd pain and mild bleeding this am.

## 2020-08-24 NOTE — ED PROVIDER NOTE - PATIENT PORTAL LINK FT
You can access the FollowMyHealth Patient Portal offered by [facility] by registering at the following website: [email or web address]. By joining [employer]’s FollowMyHealth portal, you will also be able to view your health information using other applications (apps) compatible with our system.

## 2020-08-24 NOTE — ED ADULT NURSE NOTE - OBJECTIVE STATEMENT
pt A&o4, pt states that she "has cyst on her ovaries and she believes one ruptured because she has light bleeding and groin pain, pt states that she no longer gets period because she is on the pill" pt has no s/s of acute distress at this time.

## 2020-08-24 NOTE — ED ADULT NURSE NOTE - PMH
Bronchitis  diagnose january 23, 2020  Fall  s/p fall January 23, 2020 went to ed, ct scan negative  Flu  went to er january 23, 2020  Migraines    Polycystic ovaries    Vertigo

## 2020-09-08 ENCOUNTER — APPOINTMENT (OUTPATIENT)
Dept: OBGYN | Facility: CLINIC | Age: 35
End: 2020-09-08
Payer: MEDICAID

## 2020-09-08 PROCEDURE — 99213 OFFICE O/P EST LOW 20 MIN: CPT

## 2020-09-15 ENCOUNTER — APPOINTMENT (OUTPATIENT)
Dept: OBGYN | Facility: CLINIC | Age: 35
End: 2020-09-15
Payer: MEDICAID

## 2020-09-15 PROCEDURE — 76830 TRANSVAGINAL US NON-OB: CPT

## 2020-09-15 PROCEDURE — 93975 VASCULAR STUDY: CPT

## 2020-09-28 ENCOUNTER — APPOINTMENT (OUTPATIENT)
Dept: OBGYN | Facility: CLINIC | Age: 35
End: 2020-09-28
Payer: MEDICAID

## 2020-09-28 DIAGNOSIS — F17.200 NICOTINE DEPENDENCE, UNSPECIFIED, UNCOMPLICATED: ICD-10-CM

## 2020-09-28 PROCEDURE — 99213 OFFICE O/P EST LOW 20 MIN: CPT

## 2020-09-28 RX ORDER — NICOTINE 21 MG/24H
21 PATCH TRANSDERMAL DAILY
Qty: 30 | Refills: 6 | Status: ACTIVE | COMMUNITY
Start: 2020-09-28 | End: 1900-01-01

## 2020-09-28 NOTE — HISTORY OF PRESENT ILLNESS
[FreeTextEntry1] : 35 y/o female referred for bleeding and pelvic pain. Pt has been having pain quite some time, tried medication and no improvement. Pt recently supressed on aygestin and pain still present.\par recent sono normal\par pt was in hospital with ruptured cyst \par \par hx btl and novasure ablation\par \par pt is a smoker unable to quit at this point\par estrogen discussed as contraindicated\par \par pt was on depoprovera-gained too much weight\par \par Pt MUST stop smoking to take continuous ocp-which will supress cyst formation\par pt understands\par not a candidate at this time for hysterectomy due to pain from cyst formation\par chantix did not work\par never tired patches to stop smoking

## 2020-12-02 ENCOUNTER — APPOINTMENT (OUTPATIENT)
Dept: BREAST CENTER | Facility: CLINIC | Age: 35
End: 2020-12-02

## 2020-12-15 ENCOUNTER — APPOINTMENT (OUTPATIENT)
Dept: OBGYN | Facility: CLINIC | Age: 35
End: 2020-12-15
Payer: MEDICAID

## 2020-12-15 PROCEDURE — 76830 TRANSVAGINAL US NON-OB: CPT

## 2020-12-15 PROCEDURE — 99072 ADDL SUPL MATRL&STAF TM PHE: CPT

## 2020-12-15 PROCEDURE — 93975 VASCULAR STUDY: CPT

## 2020-12-30 ENCOUNTER — APPOINTMENT (OUTPATIENT)
Dept: OBGYN | Facility: CLINIC | Age: 35
End: 2020-12-30
Payer: MEDICAID

## 2020-12-30 VITALS — DIASTOLIC BLOOD PRESSURE: 73 MMHG | SYSTOLIC BLOOD PRESSURE: 118 MMHG

## 2020-12-30 DIAGNOSIS — N93.9 ABNORMAL UTERINE AND VAGINAL BLEEDING, UNSPECIFIED: ICD-10-CM

## 2020-12-30 PROCEDURE — 99072 ADDL SUPL MATRL&STAF TM PHE: CPT

## 2020-12-30 PROCEDURE — 99213 OFFICE O/P EST LOW 20 MIN: CPT

## 2020-12-30 NOTE — DISCUSSION/SUMMARY
[FreeTextEntry1] : 36 yo on continuous norethindrone for HMB\par - discussed breakthrough bleeding on medication is common, advised a medication holiday for 2-4 weeks and then resume taking it \par - discussed smoking cessation, reports decreased usage from 1 PPD to 3 cigarettes daily.\par - f/u Dr. Sommer PRN

## 2020-12-30 NOTE — HISTORY OF PRESENT ILLNESS
[FreeTextEntry1] : 34 yo h/o PCOS and HMB. Per patient shee is taking norethindrone continuously x approx 1 year. Reports spotting x 3 weeks now. Denies any dizziness, lightheaded, palpitations. H/o BTL and endometrial ablation. She is being followed by Dr Sommer and Dr Marks. She had a TVUS on 12/20/20 that did not show any abnormalities.

## 2021-02-08 ENCOUNTER — RESULT REVIEW (OUTPATIENT)
Age: 36
End: 2021-02-08

## 2021-02-08 ENCOUNTER — OUTPATIENT (OUTPATIENT)
Dept: OUTPATIENT SERVICES | Facility: HOSPITAL | Age: 36
LOS: 1 days | Discharge: HOME | End: 2021-02-08
Payer: MEDICAID

## 2021-02-08 DIAGNOSIS — J34.2 DEVIATED NASAL SEPTUM: Chronic | ICD-10-CM

## 2021-02-08 DIAGNOSIS — R92.8 OTHER ABNORMAL AND INCONCLUSIVE FINDINGS ON DIAGNOSTIC IMAGING OF BREAST: ICD-10-CM

## 2021-02-08 DIAGNOSIS — Z90.89 ACQUIRED ABSENCE OF OTHER ORGANS: Chronic | ICD-10-CM

## 2021-02-08 DIAGNOSIS — Z98.890 OTHER SPECIFIED POSTPROCEDURAL STATES: Chronic | ICD-10-CM

## 2021-02-08 DIAGNOSIS — Z98.51 TUBAL LIGATION STATUS: Chronic | ICD-10-CM

## 2021-02-08 PROCEDURE — 76642 ULTRASOUND BREAST LIMITED: CPT | Mod: 26,RT

## 2021-02-12 ENCOUNTER — NON-APPOINTMENT (OUTPATIENT)
Age: 36
End: 2021-02-12

## 2021-02-15 ENCOUNTER — RESULT REVIEW (OUTPATIENT)
Age: 36
End: 2021-02-15

## 2021-02-15 ENCOUNTER — OUTPATIENT (OUTPATIENT)
Dept: OUTPATIENT SERVICES | Facility: HOSPITAL | Age: 36
LOS: 1 days | Discharge: HOME | End: 2021-02-15
Payer: MEDICAID

## 2021-02-15 DIAGNOSIS — J34.2 DEVIATED NASAL SEPTUM: Chronic | ICD-10-CM

## 2021-02-15 DIAGNOSIS — Z90.89 ACQUIRED ABSENCE OF OTHER ORGANS: Chronic | ICD-10-CM

## 2021-02-15 DIAGNOSIS — Z98.890 OTHER SPECIFIED POSTPROCEDURAL STATES: Chronic | ICD-10-CM

## 2021-02-15 DIAGNOSIS — R92.2 INCONCLUSIVE MAMMOGRAM: ICD-10-CM

## 2021-02-15 DIAGNOSIS — Z98.51 TUBAL LIGATION STATUS: Chronic | ICD-10-CM

## 2021-02-15 PROCEDURE — 76641 ULTRASOUND BREAST COMPLETE: CPT | Mod: 26,50

## 2021-02-15 PROCEDURE — 77066 DX MAMMO INCL CAD BI: CPT | Mod: 26

## 2021-02-15 PROCEDURE — G0279: CPT | Mod: 26

## 2021-02-18 ENCOUNTER — APPOINTMENT (OUTPATIENT)
Dept: BREAST CENTER | Facility: CLINIC | Age: 36
End: 2021-02-18
Payer: MEDICAID

## 2021-02-23 ENCOUNTER — APPOINTMENT (OUTPATIENT)
Dept: BREAST CENTER | Facility: CLINIC | Age: 36
End: 2021-02-23
Payer: MEDICAID

## 2021-04-29 ENCOUNTER — APPOINTMENT (OUTPATIENT)
Dept: OBGYN | Facility: CLINIC | Age: 36
End: 2021-04-29
Payer: MEDICAID

## 2021-04-29 PROCEDURE — 99213 OFFICE O/P EST LOW 20 MIN: CPT

## 2021-04-29 PROCEDURE — 99072 ADDL SUPL MATRL&STAF TM PHE: CPT

## 2021-05-07 ENCOUNTER — APPOINTMENT (OUTPATIENT)
Dept: BREAST CENTER | Facility: CLINIC | Age: 36
End: 2021-05-07
Payer: MEDICAID

## 2021-05-07 VITALS
WEIGHT: 144 LBS | DIASTOLIC BLOOD PRESSURE: 74 MMHG | BODY MASS INDEX: 24.59 KG/M2 | HEIGHT: 64 IN | TEMPERATURE: 97.9 F | SYSTOLIC BLOOD PRESSURE: 122 MMHG

## 2021-05-07 DIAGNOSIS — R92.8 OTHER ABNORMAL AND INCONCLUSIVE FINDINGS ON DIAGNOSTIC IMAGING OF BREAST: ICD-10-CM

## 2021-05-07 PROCEDURE — 99072 ADDL SUPL MATRL&STAF TM PHE: CPT

## 2021-05-07 PROCEDURE — 99213 OFFICE O/P EST LOW 20 MIN: CPT

## 2021-05-07 NOTE — ASSESSMENT
[FreeTextEntry1] : Birdie is a 35F who presents with a right breast fibroadenoma, s/p R WLE on 1/31/2020.\par \par 1/31/2020 -- R WLE \par -fibroadenoma \par -PASH\par \par On exam, I was not able to palpate any suspicious abnormalities within either breast.  \par She had a R breast US on 2/8/2021 which revealed @12N5-6, 6 x 8 x 3 mm oval circumscribed hypoechoic probable cyst with debris, deemed BIRADS 3. \par \par She also had a b/l dx mammogram anD US on 2/15/2021 which revealed bilateral post surgical changes, but no other suspicious abnormalities were noted within either breast. \par \par She will be due for a R breast US on 8/8/2021.  THis will be scheduled for her today.  I will have her follow up after for a CBE.  \par \par AS REVIEW: \par In regards to her breast pain, it may be related to fibrocystic changes within her breast that are hormonally influenced. We spoke about possible interventions including evening primrose oil, supportive bras, smoking cessation and decreasing caffeine intake.  Although none of these have been consistently proven to improve breast pain, they may be tried.  \par \par We also discussed dense breasts.  Increasing breast density has been found to increase ones risk of breast cancer, but at this time, there is no clear indication for additional imaging in this setting, as both US and MRI have not been found to improve survival.  One can consider bilateral screening US.  However, out of 1000 women screened, the use of routine US will only identify an additional 3-5 cancers.  The use of US was found to increase the likelihood of undergoing more imaging and more biopsies.  She does have dense breasts.  We have decided to proceed with screening bilateral breast US at this time.  This will be scheduled with her next screening mammogram.\par \par Based off her family history  her risk assessment is as follows: \par RISK ASSESSMENT:\par Betsy \par 5 yr -- 1.3%\par lifetime -- 10.9%\par \par TC v 6 \par 5yr -- 2%\par lifetime -- 12.7%\par \par This puts her at an average risk for breast cancer.  She should start yearly screening mammogram/US at the age 40 and annually thereafter. \par \par All of her questions were answered.  She knows to call with any further questions or concerns. \par \par PLAN\par -R breast US on 8/8/2021\par -f/up after

## 2021-05-07 NOTE — PAST MEDICAL HISTORY
[History of Hormone Replacement Treatment] : has no history of hormone replacement treatment [FreeTextEntry6] : denies [FreeTextEntry5] : s/p endometrial ablation and tubal ligation in 2016\par scheduled for a R salpingo-oophorectomy for painful ovarian cyst on 9/25/18 [FreeTextEntry7] : yes currently  [FreeTextEntry8] : denies

## 2021-05-07 NOTE — REVIEW OF SYSTEMS
[Fever] : no fever [Chills] : no chills [Feeling Poorly] : not feeling poorly [Feeling Tired] : not feeling tired [Pelvic Pain] : no pelvic pain [Abn Vaginal Bleeding] : no unexplained vaginal bleeding [Skin Lesions] : no skin lesions [Breast Pain] : no breast pain [Breast Lump] : no breast lump [Hot Flashes] : no hot flashes

## 2021-05-07 NOTE — DATA REVIEWED
[FreeTextEntry1] : EXAM:  US BREAST COMPLETE BI\par EXAM:  MG MAMMO DIAG W KAMILLE BI#\par \par \par PROCEDURE DATE:  07/31/2020\par \par \par \par INTERPRETATION:  Clinical History / Reason for exam:  34 years old patient for bilateral diagnostic mammography and right breast ultrasound. She is status post interval excision of a fibroadenoma from the right upper outer quadrant on 1/29/2020. Patient also has history of additional bilateral benign excisional biopsies.\par \par Last CBE: 6 months ago\par \par FAMILY HISTORY OF BREAST CANCER: Maternal aunt\par \par TECHNIQUE: Bilateral  full field digital mammography including Tomosynthesis in CC and MLO projections. CAD was also utilized.\par \par COMPARISON: Dating back to 8/13/2018\par \par BREAST COMPOSITION: The breasts are heterogeneously dense, which may obscure small masses.\par \par FINDINGS:\par \par MAMMOGRAM:\par Areas of postsurgical distortion are noted in the left upper inner, right upper outer and right central lower breast. No new discrete mass is identified. There are no suspicious groups of microcalcifications.\par \par Skin, nipples and subcutaneous tissues are unremarkable.\par \par BREAST ULTRASOUND:\par Complete bilateral breast ultrasound was performed.\par \par Comparison: Dating back to 8/13/2018\par \par Postsurgical changes are present in the right upper outer quadrant under the skin scar.\par \par At the right 12:00 axis, 5 to 6 cm from the nipple, there is an oval circumscribed 5 x 8 x 3 mm hypoechoic mass with parallel orientation and no associated vascularity, new and probably benign.\par \par In the left upper inner quadrant, postsurgical changes are seen under the surgical scar. There is no axillary lymphadenopathy on either side.\par \par IMPRESSION:\par \par Benign bilateral postsurgical changes.\par \par No significant interval change on mammography to suggest malignancy.\par \par Probably benign circumscribed hypoechoic mass at the right 12:00 axis, 5 to 6 cm from the nipple, short interval follow-up targeted ultrasound is suggested in 6 months.\par \par Recommendation: Follow-up breast ultrasound in 6 months.\par \par BI-RADS category 3: Probably Benign\par \par The findings and recommendations were discussed with the patient.\par \par \par \par \par \par OANH QUINTERO M.D., ATTENDING RADIOLOGIST\par This document has been electronically signed. Aug  3 2020  1:23PM\par \par \par EXAM:  MG US BREAST LIMITED RT\par \par \par PROCEDURE DATE:  02/08/2021\par \par \par \par INTERPRETATION:  Clinical History / Reason for exam:  35 years old patient for short interval follow-up right breast targeted ultrasound, suggested on 8/1/2020. History of prior bilateral benign excisional biopsies.\par \par Last CBE: Within the past year\par \par FAMILY HISTORY OF BREAST CANCER: 2 maternal aunts\par \par BREAST ULTRASOUND:\par Targeted right breast ultrasound was performed. Comparison: 8/1/2020\par \par Right 12:00 axis, 5 to 6 cm from the nipple, 6 x 8 x 3 mm oval circumscribed hypoechoic probable cyst with debris, stable.\par \par IMPRESSION:\par \par Stable probably benign mass right 12:00 axis. Follow-up targeted ultrasound is suggested in 6 months.\par \par Recommendation: Follow-up breast ultrasound in 6 months.\par \par BI-RADS category 3: Probably Benign\par \par The findings and recommendations were discussed with the patient prior to departure.\par \par \par \par \par OANH QUINTERO MD; Attending Radiologist\par This document has been electronically signed. Feb 8 2021  4:48PM\par \par EXAM:  MG US BREAST COMPLETE BI\par EXAM:  MG MAMMO DIAG W KAMILLE BI#\par \par \par PROCEDURE DATE:  02/15/2021\par \par \par \par INTERPRETATION:  Clinical History / Reason for exam: Right breast upper outer quadrant regional pain and inferior left breast area of palpable concern. Additional probably benign right breast 12:00 position mass identified in July 2020.\par \par The patient reports her last clinical breast examination was performed 6 months ago.\par \par Family history: None\par \par Comparisons: Priors dating back to July 2000 and.\par \par Views obtained:Bilateral full field CC and MLO views with tomosynthesis. Spot compression views of bilateral breasts with tomosynthesis.\par \par Computer-aided detection was utilized in the interpretation of this examination.\par \par Breast composition:The breasts are heterogeneously dense, which may obscure small masses.\par \par Findings:\par \par Mammogram:\par \par Radiopaque BB marker indicates the site of focal pain in the right breast upper outer quadrant. Triangular marker denotes site of palpable concern in the inferior left breast. No suspicious mass, microcalcifications or areas of architectural distortion is seen either breast. There are benign postsurgical changes in bilateral breasts. When compared to the previous examinations there is no significant interval change and nothing to suggest malignancy.\par \par Ultrasound:\par \par Targeted bilateral breast ultrasound was performed.\par \par No suspicious solid or cystic masses in the right breast upper outer quadrant to correlate with the area of focal pain.\par \par No suspicious solid or cystic masses in the left breast 6:00 position 5 to 6 cm from the nipple to correspond with the area palpable concern.\par \par Impression: No mammographic or sonographic evidence of malignancy. No mammographic or sonographic correlate to the bilateral areas of clinical concern.\par \par Recommendation: Follow-up breast ultrasound in 6 months. Short interval follow-up of the right breast 12:00 position probably benign mass as per prior report.\par \par BI-RADS category 3: Probably Benign\par \par The above findings and recommendations were discussed with the patient at the time of the examination.\par \par \par \par \par ORIN WESTFALL DO; Attending Radiologist\par This document has been electronically signed. Feb 15 2021  1:03PM\par \par

## 2021-05-07 NOTE — PHYSICAL EXAM
[No dominant masses] : no dominant masses in right breast  [de-identified] : in UOQ, surgical incision is well healed, no suspicious abnormalities were palpated  [de-identified] : nondiscrete nodularities palpated throughout, but no suspicious masses palpated

## 2021-05-07 NOTE — HISTORY OF PRESENT ILLNESS
[FreeTextEntry1] : Birdie is a 35F who presents with a self palpated right breast mass and breast pain, biopsy proven fibroadenoma, s/p R WLE on 2020. \par \par 2020 -- R WLE \par -fibroadenoma, PASH \par \par She first noticed this mass several weeks prior.  She underwent the following diagnostic imaging at that time:\par \par 18 -- b/l dx mammogram and R breast US \par -heterogenously dense breasts \par -R breast mass in UOQ, 1.1 cm\par -no suspicious architectural distortion or calcifications identified \par R breast US \par -@10:00, 10 cm FN, oval circumscribed hypoechoic mass, 1 x 1 x 0.6 cm \par BIRADS 3: rec R dx mammogram and US in 6 months \par \par She also has tenderness in the UOQ in the area of this right breast mass.  She denies any overlying skin changes, fevers or chills, does not think that the mass has changed in size at all and she has not had any nipple discharge b/l or palpated any left sided breast lesions. Of note, she has had her nipple pierced in the past and is a current smoker. She also has polycystic ovarian syndrome and is scheduled for a R salpingo-oophorectomy on 18 for a painful R ovarian cyst.  \par \par HISTORICAL RISK FACTORS: \par -2 prior lumpectomy:\par     @age 17, R inferior lumpectomy -- benign \par     @age 21, L UIQ lumpectomy -- benign \par -family history of breast cancer in two maternal aunts, dx in their 50s \par -, age at first live birth was 22\par -currently on OCPs\par \par RISK ASSESSMENT:\par Betsy \par 5 yr -- 1.3%\par lifetime -- 10.9%\par \par TC v 6 \par 5yr -- 2%\par lifetime -- 12.7%\par \par INTERVAL HISTORY: \par Birdie returns for her FIRST FOLLOW UP visit for b/l breast pain and a R breast mass @10N10 which was biopsy proven fibroadenoma.  \par \par She underwent a R WLE of this fibroadenoma on 2020.  Her final pathology revealed a fibroadenoma.  She is healing well from her surgery.  She denies any pain, and denies any fevers, chills or drainage. \par \par AS REVIEW:\par Her most recent imaging was a b/l dx tomosynthesis and b/l US on 8/15/19 which revealed stability of her right breast mass @10N10 measuring 1 x 0.8 x 0.5 cm, deemed BIRADS 3. \par \par 2020 -- R US guided bx @10N10\par -fibroadenoma with PASH \par -proliferative type FC changes \par \par She still has breast pain in bilateral breasts, pain scale 8/10.  SHe has since quit smoking and tried supportive bras with little improvement in her breast pain.  After her last visit, she was started on tamoxifen 10 mg daily x 6 weeks and returns for a short term follow up for re-evaluation of her breast pain. \par \par She stopped taking the tamoxifen after three days because she was having severe mood swings.  She is still having pain, but predominantly in her right UOQ.  \par \par 2021 -- \par Birdie returns for a short term follow up.  She has breast pain in the R lateral breast in the area of her prior surgery, but it is improved from her prior surgery.  She has not palpated any new breast masses and denies any nipple discharge or retraction. \par \par She had a R breast US on 2021 which revealed @12N5-6, 6 x 8 x 3 mm oval circumscribed hypoechoic probable cyst with debris, deemed BIRADS 3. \par \par She also had a b/l dx mammogram anD US on 2/15/2021 which revealed bilateral post surgical changes, but no other suspicious abnormalities were noted within either breast. \par \par

## 2021-05-22 ENCOUNTER — TRANSCRIPTION ENCOUNTER (OUTPATIENT)
Age: 36
End: 2021-05-22

## 2021-07-04 ENCOUNTER — TRANSCRIPTION ENCOUNTER (OUTPATIENT)
Age: 36
End: 2021-07-04

## 2021-08-09 ENCOUNTER — RESULT REVIEW (OUTPATIENT)
Age: 36
End: 2021-08-09

## 2021-08-09 ENCOUNTER — OUTPATIENT (OUTPATIENT)
Dept: OUTPATIENT SERVICES | Facility: HOSPITAL | Age: 36
LOS: 1 days | Discharge: HOME | End: 2021-08-09
Payer: MEDICAID

## 2021-08-09 DIAGNOSIS — Z98.890 OTHER SPECIFIED POSTPROCEDURAL STATES: Chronic | ICD-10-CM

## 2021-08-09 DIAGNOSIS — R92.8 OTHER ABNORMAL AND INCONCLUSIVE FINDINGS ON DIAGNOSTIC IMAGING OF BREAST: ICD-10-CM

## 2021-08-09 DIAGNOSIS — Z98.51 TUBAL LIGATION STATUS: Chronic | ICD-10-CM

## 2021-08-09 DIAGNOSIS — Z90.89 ACQUIRED ABSENCE OF OTHER ORGANS: Chronic | ICD-10-CM

## 2021-08-09 DIAGNOSIS — J34.2 DEVIATED NASAL SEPTUM: Chronic | ICD-10-CM

## 2021-08-09 PROCEDURE — 76642 ULTRASOUND BREAST LIMITED: CPT | Mod: 26,RT

## 2021-08-11 ENCOUNTER — NON-APPOINTMENT (OUTPATIENT)
Age: 36
End: 2021-08-11

## 2021-08-13 ENCOUNTER — APPOINTMENT (OUTPATIENT)
Dept: OTOLARYNGOLOGY | Facility: CLINIC | Age: 36
End: 2021-08-13
Payer: MEDICAID

## 2021-08-13 DIAGNOSIS — H93.8X9 OTHER SPECIFIED DISORDERS OF EAR, UNSPECIFIED EAR: ICD-10-CM

## 2021-08-13 DIAGNOSIS — J01.90 ACUTE SINUSITIS, UNSPECIFIED: ICD-10-CM

## 2021-08-13 PROCEDURE — 31231 NASAL ENDOSCOPY DX: CPT

## 2021-08-13 PROCEDURE — 99214 OFFICE O/P EST MOD 30 MIN: CPT | Mod: 25

## 2021-08-13 PROCEDURE — 92557 COMPREHENSIVE HEARING TEST: CPT

## 2021-08-13 PROCEDURE — 92550 TYMPANOMETRY & REFLEX THRESH: CPT

## 2021-08-13 RX ORDER — LEVOFLOXACIN 500 MG/1
500 TABLET, FILM COATED ORAL
Qty: 5 | Refills: 0 | Status: ACTIVE | COMMUNITY
Start: 2021-08-13 | End: 1900-01-01

## 2021-08-13 NOTE — HISTORY OF PRESENT ILLNESS
[de-identified] : Pt returns to the office as a follow up on nasal congestion and ear noises. She is s/p SMRT, has been feeling great until 2 months ago when she started having postnasal drip, intermittent congestion in both sides of the nose and steady ringing in the ears. \par sense of smell ok.  [FreeTextEntry1] : \par 8/13/21: Patient presents today with c/o possible fluid in her ears. Patient states about 2 weeks ago started having static noise in her ears. Seen in UC; also diagnosed with sinus infection and bronchitis. took antibiotics, chest is better but No improvement on her ears. No hearing loss. currently using flonase in PM. Nasal obstruction alternating sides.

## 2021-08-13 NOTE — ASSESSMENT
[FreeTextEntry1] : I reviewed, interpreted, and discussed the Audiogram done today. Mild SNHL. normal tympanograms. \par \par pus in left middle meatus, culture taken. Continue flonase. \par \par RTC in 10days for CT if no improvement.

## 2021-08-18 LAB — BACTERIA SPEC CULT: ABNORMAL

## 2021-09-27 ENCOUNTER — APPOINTMENT (OUTPATIENT)
Dept: OTOLARYNGOLOGY | Facility: CLINIC | Age: 36
End: 2021-09-27
Payer: MEDICAID

## 2021-09-27 DIAGNOSIS — J34.89 OTHER SPECIFIED DISORDERS OF NOSE AND NASAL SINUSES: ICD-10-CM

## 2021-09-27 DIAGNOSIS — H61.20 IMPACTED CERUMEN, UNSPECIFIED EAR: ICD-10-CM

## 2021-09-27 PROCEDURE — 31231 NASAL ENDOSCOPY DX: CPT

## 2021-09-27 PROCEDURE — 99214 OFFICE O/P EST MOD 30 MIN: CPT | Mod: 25

## 2021-09-27 PROCEDURE — 92567 TYMPANOMETRY: CPT

## 2021-09-27 RX ORDER — AZELASTINE HYDROCHLORIDE 137 UG/1
137 SPRAY, METERED NASAL DAILY
Qty: 2 | Refills: 2 | Status: ACTIVE | COMMUNITY
Start: 2021-09-27 | End: 1900-01-01

## 2021-09-27 NOTE — HISTORY OF PRESENT ILLNESS
[de-identified] : Pt returns to the office as a follow up on nasal congestion and ear noises. She is s/p SMRT, has been feeling great until 2 months ago when she started having postnasal drip, intermittent congestion in both sides of the nose and steady ringing in the ears. \par sense of smell ok. \par \par 8/13/21: Patient presents today with c/o possible fluid in her ears. Patient states about 2 weeks ago started having static noise in her ears. Seen in UC; also diagnosed with sinus infection and bronchitis. took antibiotics, chest is better but No improvement on her ears. No hearing loss. currently using flonase in PM. Nasal obstruction alternating sides.  [FreeTextEntry1] : \par 09/27/21 : Patient returns today following up on fluid in ears  . Was  Seen in Urgent care on Friday told she has fluid in ears . Has ear pain and itchiness.  Wasn't started on medication . also complaining of nasal blockage and rhinorrhea since friday.

## 2021-09-27 NOTE — PHYSICAL EXAM
[Midline] : trachea located in midline position [Normal] : no rashes [de-identified] : bilateral impacted earwax cleaned with curette and suction

## 2021-09-27 NOTE — ASSESSMENT
[FreeTextEntry1] : probable URI with ear pressure and rhinorrhea. \par \par continue flonase.\par \par Patient advised about risk of bacterial superinfection where she would need abx.

## 2021-10-05 ENCOUNTER — APPOINTMENT (OUTPATIENT)
Dept: BREAST CENTER | Facility: CLINIC | Age: 36
End: 2021-10-05

## 2021-10-05 NOTE — PHYSICAL EXAM
[Normocephalic] : normocephalic [Atraumatic] : atraumatic [EOMI] : extra ocular movement intact [No Supraclavicular Adenopathy] : no supraclavicular adenopathy [No Cervical Adenopathy] : no cervical adenopathy [Examined in the supine and seated position] : examined in the supine and seated position [Symmetrical] : symmetrical [No dominant masses] : no dominant masses in right breast  [No dominant masses] : no dominant masses left breast [No Nipple Retraction] : no left nipple retraction [No Nipple Discharge] : no left nipple discharge [No Axillary Lymphadenopathy] : no left axillary lymphadenopathy [Soft] : abdomen soft [Not Tender] : non-tender [No Edema] : no edema [No Rashes] : no rashes [No Ulceration] : no ulceration [de-identified] : in UOQ, surgical incision is well healed, no suspicious abnormalities were palpated  [de-identified] : nondiscrete nodularities palpated throughout, but no suspicious masses palpated

## 2021-10-05 NOTE — REASON FOR VISIT
[Follow-Up: _____] : a [unfilled] follow-up visit [FreeTextEntry1] : breast pain, BIRADS 3 right breast mass

## 2021-10-05 NOTE — HISTORY OF PRESENT ILLNESS
[FreeTextEntry1] : Birdie is a 35F who presents with a self palpated right breast mass and breast pain, biopsy proven fibroadenoma, s/p R WLE on 2020. \par \par 2020 -- R WLE \par -fibroadenoma, PASH \par \par She first noticed this mass several weeks prior.  She underwent the following diagnostic imaging at that time:\par \par 18 -- b/l dx mammogram and R breast US \par -heterogenously dense breasts \par -R breast mass in UOQ, 1.1 cm\par -no suspicious architectural distortion or calcifications identified \par R breast US \par -@10:00, 10 cm FN, oval circumscribed hypoechoic mass, 1 x 1 x 0.6 cm \par BIRADS 3: rec R dx mammogram and US in 6 months \par \par She also has tenderness in the UOQ in the area of this right breast mass.  She denies any overlying skin changes, fevers or chills, does not think that the mass has changed in size at all and she has not had any nipple discharge b/l or palpated any left sided breast lesions. Of note, she has had her nipple pierced in the past and is a current smoker. She also has polycystic ovarian syndrome and is scheduled for a R salpingo-oophorectomy on 18 for a painful R ovarian cyst.  \par \par HISTORICAL RISK FACTORS: \par -2 prior lumpectomy:\par     @age 17, R inferior lumpectomy -- benign \par     @age 21, L UIQ lumpectomy -- benign \par -family history of breast cancer in two maternal aunts, dx in their 50s \par -, age at first live birth was 22\par -currently on OCPs\par \par RISK ASSESSMENT:\par Betsy \par 5 yr -- 1.3%\par lifetime -- 10.9%\par \par TC v 6 \par 5yr -- 2%\par lifetime -- 12.7%\par \par INTERVAL HISTORY: \par Birdie returns for her FIRST FOLLOW UP visit for b/l breast pain and a R breast mass @10N10 which was biopsy proven fibroadenoma.  \par \par She underwent a R WLE of this fibroadenoma on 2020.  Her final pathology revealed a fibroadenoma.  She is healing well from her surgery.  She denies any pain, and denies any fevers, chills or drainage. \par \par AS REVIEW:\par Her most recent imaging was a b/l dx tomosynthesis and b/l US on 8/15/19 which revealed stability of her right breast mass @10N10 measuring 1 x 0.8 x 0.5 cm, deemed BIRADS 3. \par \par 2020 -- R US guided bx @10N10\par -fibroadenoma with PASH \par -proliferative type FC changes \par \par She still has breast pain in bilateral breasts, pain scale 8/10.  SHe has since quit smoking and tried supportive bras with little improvement in her breast pain.  After her last visit, she was started on tamoxifen 10 mg daily x 6 weeks and returns for a short term follow up for re-evaluation of her breast pain. \par \par She stopped taking the tamoxifen after three days because she was having severe mood swings.  She is still having pain, but predominantly in her right UOQ.  \par \par 2021 -- \troy Packer returns for a short term follow up.  She has breast pain in the R lateral breast in the area of her prior surgery, but it is improved from her prior surgery.  She has not palpated any new breast masses and denies any nipple discharge or retraction. \par \par She had a R breast US on 2021 which revealed @12N5-6, 6 x 8 x 3 mm oval circumscribed hypoechoic probable cyst with debris, deemed BIRADS 3. \par \par She also had a b/l dx mammogram anD US on 2/15/2021 which revealed bilateral post surgical changes, but no other suspicious abnormalities were noted within either breast. \par \par 10/05/2021 --\troy Packer returns for follow-up visit and imaging review.\par She has no breast related complaints at this time.  She denies any breast pain, has not palpated any new palpable masses in either breast and denies any nipple discharge or retraction.\par \par Her most recent imaging was a right breast sonogram on 2021 which revealed again identified is an oval circumscribed hypoechoic mass at the 12:00 position 5 to 6 cm from the nipple which is without significant change measuring 0.6 cm x 0.5 cm x 0.3 cm.  This has demonstrated greater than 2 years of stability and is benign.\par The BIRADS category was 2.

## 2021-10-05 NOTE — DATA REVIEWED
[FreeTextEntry1] : Right Breast Sono - 08/09/2021:\par Targeted Right Breast Sonogram:\par \par Again identified is an oval circumscribed hypoechoic mass at the 12:00 position 5 to 6 cm from the nipple which is without significant change measuring 0.6 cm x 0.5 cm x 0.3 cm.  This has demonstrated greater than 2 years of stability and is benign.\par \par IMPRESSION: Benign stable right breast nodule at the 12:00 location 5 cm from the nipple as above.\par \par Recommendation: Unless otherwise indicated by clinical findings or risk factors, the patient should initiate annual screening beginning at age 40.\par \par BI-RADS Category 2: Benign\par \par

## 2021-10-05 NOTE — REVIEW OF SYSTEMS
[As Noted in HPI] : as noted in HPI [Skin Wound] : skin wound [Negative] : Heme/Lymph [Fever] : no fever [Chills] : no chills [Feeling Poorly] : not feeling poorly [Feeling Tired] : not feeling tired [Pelvic Pain] : no pelvic pain [Abn Vaginal Bleeding] : no unexplained vaginal bleeding [Skin Lesions] : no skin lesions [Breast Pain] : no breast pain [Breast Lump] : no breast lump [Hot Flashes] : no hot flashes

## 2021-11-17 ENCOUNTER — APPOINTMENT (OUTPATIENT)
Dept: OPHTHALMOLOGY | Facility: CLINIC | Age: 36
End: 2021-11-17
Payer: MEDICAID

## 2021-11-17 ENCOUNTER — OUTPATIENT (OUTPATIENT)
Dept: OUTPATIENT SERVICES | Facility: HOSPITAL | Age: 36
LOS: 1 days | Discharge: HOME | End: 2021-11-17

## 2021-11-17 DIAGNOSIS — Z90.89 ACQUIRED ABSENCE OF OTHER ORGANS: Chronic | ICD-10-CM

## 2021-11-17 DIAGNOSIS — Z98.890 OTHER SPECIFIED POSTPROCEDURAL STATES: Chronic | ICD-10-CM

## 2021-11-17 DIAGNOSIS — J34.2 DEVIATED NASAL SEPTUM: Chronic | ICD-10-CM

## 2021-11-17 DIAGNOSIS — Z98.51 TUBAL LIGATION STATUS: Chronic | ICD-10-CM

## 2021-11-17 PROCEDURE — ZZZZZ: CPT

## 2021-11-30 NOTE — H&P PST ADULT - FAMILY HISTORY
unsteady gait Mother  Still living? Unknown  Family history of heart disease, Age at diagnosis: Age Unknown  Family history of diabetes mellitus, Age at diagnosis: Age Unknown  Family history of renal disease, Age at diagnosis: Age Unknown     Grandparent  Still living? Unknown  Family history of heart disease, Age at diagnosis: Age Unknown  Family history of diabetes mellitus, Age at diagnosis: Age Unknown

## 2022-01-13 ENCOUNTER — OUTPATIENT (OUTPATIENT)
Dept: EMERGENCY DEPT | Facility: HOSPITAL | Age: 37
LOS: 1 days | Discharge: HOME | End: 2022-01-13
Payer: MEDICAID

## 2022-01-13 DIAGNOSIS — Z20.828 CONTACT WITH AND (SUSPECTED) EXPOSURE TO OTHER VIRAL COMMUNICABLE DISEASES: ICD-10-CM

## 2022-01-13 DIAGNOSIS — Z90.89 ACQUIRED ABSENCE OF OTHER ORGANS: Chronic | ICD-10-CM

## 2022-01-13 DIAGNOSIS — Z98.890 OTHER SPECIFIED POSTPROCEDURAL STATES: Chronic | ICD-10-CM

## 2022-01-13 DIAGNOSIS — J34.2 DEVIATED NASAL SEPTUM: Chronic | ICD-10-CM

## 2022-01-13 DIAGNOSIS — Z98.51 TUBAL LIGATION STATUS: Chronic | ICD-10-CM

## 2022-01-13 LAB
RAPID RVP RESULT: SIGNIFICANT CHANGE UP
SARS-COV-2 RNA SPEC QL NAA+PROBE: SIGNIFICANT CHANGE UP

## 2022-01-13 PROCEDURE — L9981: CPT

## 2022-01-25 NOTE — ED ADULT TRIAGE NOTE - CHIEF COMPLAINT QUOTE
Patient c/o Right flank pain with urinary frequency  for the last few days. Denies dysuria fevers and chills. Psychotherapy Provided: Individual Psychotherapy 30 minutes     Length of time in session: 30 minutes, follow up in 2 week  Encounter Diagnosis     ICD-10-CM    1  Major depression, recurrent, chronic (HCC)  F33 9    2  Anxiety  F41 9        Goals addressed in session: Goal 1     Current suicide risk : Low     Data:  Psychologist met with Ana Thomas  Session was conducted In-person and lasted for 30 minutes  Ana Thomas  reported ongoing clinical symptoms  she rated her anxiety and depression as 6 on a scale of 1 (best) to 10 (worst)  Anabarb Thomas processed a desire to "use my voice" regarding wanting to have the the no-contact order discontinued  Richard Cooper stated "I want him back in my life  I'm doing it because this is what I truly want " Ascertained safety concern and she noted "I feel safe, I'm willing to work with him on a relationship " Discussed things that she could work on such as improved communication skills, setting boundaries and utilizing adaptive coping skills  Richard Cooper was able to engage in a therpuatic activity in which she was asked to imagine if her paramour wouldn't change what would be her reaction and she noted "this will be the final chance  If this happens again it won't happen again "  Encouraged Sherry to practice assertiveness skills  Specific techniques used in this session were: psychoeducation, problem solving approach, pro/con analysis, assertiveness skills training, empathetic listening, validation, cognitive restructuring, emotional regulation skills, motivational interviewing, distress tolerance, role playing and experiential techniques  Assessment: Ana Thomas  was oriented to person, place, time and situation  Grooming and Hygiene were good  and clothing was casually dressed and appropriate for weather  Ability to preform ADLs has not changed  she demonstrated content mood and the affect was congruent   Ana Thomas made minimal progress toward her treatment goals as she displayed rigidity  she was cooperative  Cat Dani denied suicidal thoughts, homicidal thoughts and self injurious behaviors  Plan: Cat [place]  will return to outpatient therapy on a bi-weekly basis  During this time, Cat Louise will practice coping skills and practice assertiveness skills training  Behavioral Health Treatment Plan [facility]: Diagnosis and Treatment Plan explained to Darnell Taylor relates understanding diagnosis and is agreeable to Treatment Plan   Yes

## 2022-02-08 NOTE — H&P PST ADULT - EYES
Annual exam    Paul Bang is a 28 y.o. G P A presenting for annual exam. Her main concerns today include  She would like to establish care here, she just moved to [place]. She would like a refill on OCP, she would like to discuss family planning, she is planning to conceive within the next few years, she is getting  in April. Ob/Gyn Hx:  APARNA ENRIQUEZ -   LMP- 23  Menarche- 13  Menses- q month, normal  Contraception- OCP  STI- none  ? SA- yes    Health maintenance:  Pap- ~ WNL per pt  Mammo- none d/t age  Colonoscopy- none  Gardasil- 3/3    History reviewed. No pertinent past medical history. History reviewed. No pertinent surgical history. Family History   Problem Relation Age of Onset    Breast Cancer Maternal Grandmother     Colon Cancer Maternal Grandfather     No Known Problems Paternal Grandfather        Social History     Socioeconomic History    Marital status: SINGLE     Spouse name: Not on file    Number of children: Not on file    Years of education: Not on file    Highest education level: Not on file   Occupational History    Not on file   Tobacco Use    Smoking status: Never Smoker    Smokeless tobacco: Never Used   Vaping Use    Vaping Use: Never used   Substance and Sexual Activity    Alcohol use: Yes     Alcohol/week: 5.0 standard drinks     Types: 5 Glasses of wine per week    Drug use: Never    Sexual activity: Yes     Partners: Male     Birth control/protection: Pill     Comment: claude    Other Topics Concern    Not on file   Social History Narrative    Not on file     Social Determinants of Health     Financial Resource Strain:     Difficulty of Paying Living Expenses: Not on file   Food Insecurity:     Worried About Running Out of Food in the Last Year: Not on file    Mayte of Food in the Last Year: Not on file   Transportation Needs:     Lack of Transportation (Medical): Not on file    Lack of Transportation (Non-Medical):  Not on file Physical Activity:     Days of Exercise per Week: Not on file    Minutes of Exercise per Session: Not on file   Stress:     Feeling of Stress : Not on file   Social Connections:     Frequency of Communication with Friends and Family: Not on file    Frequency of Social Gatherings with Friends and Family: Not on file    Attends Denominational Services: Not on file    Active Member of [address] [place] [address] or Organizations: Not on file    Attends Club or Organization Meetings: Not on file    Marital Status: Not on file   Intimate Partner Violence:     Fear of Current or Ex-Partner: Not on file    Emotionally Abused: Not on file    Physically Abused: Not on file    Sexually Abused: Not on file   Housing Stability:     Unable to Pay for Housing in the Last Year: Not on file    Number of [place] in the Last Year: Not on file    Unstable Housing in the Last Year: Not on file           Allergies   Allergen Reactions    Penicillins Hives       Review of Systems - History obtained from the patient  Constitutional: negative for weight loss, fever, night sweats  HEENT: negative for hearing loss, earache, congestion, snoring, sorethroat  CV: negative for chest pain, palpitations, edema  Resp: negative for cough, shortness of breath, wheezing  GI: negative for change in bowel habits, abdominal pain, black or bloody stools  : negative for frequency, dysuria, hematuria, vaginal discharge  MSK: negative for back pain, joint pain, muscle pain  Breast: negative for breast lumps, nipple discharge, galactorrhea  Skin :negative for itching, rash, hives  Neuro: negative for dizziness, headache, confusion, weakness  Psych: negative for anxiety, depression, change in mood  Heme/lymph: negative for bleeding, bruising, pallor    Physical Exam    Visit Vitals  Ht 5' 3\" (1.6 m)   Wt 143 lb 3.2 oz (65 kg)   LMP 02/07/2022   BMI 25.37 kg/m²       Constitutional  · Appearance: well-nourished, well developed, alert, in no acute distress    HENT  · Head and Face: appears normal    Neck  · Inspection/Palpation: normal appearance, no masses or tenderness  · Lymph Nodes: no lymphadenopathy present  · Thyroid: gland size normal, nontender, no nodules or masses present on palpation    Chest  · Respiratory Effort: non-labored breathing  · Auscultation: CTAB, normal breath sounds    Cardiovascular  · Heart:  · Auscultation: regular rate and rhythm without murmur  · Extremities: no peripheral edema    Breasts  · Inspection of Breasts: breasts symmetrical, no skin changes, no discharge present, nipple appearance normal, no skin retraction present  · Palpation of Breasts and Axillae: no masses present on palpation, no breast tenderness  · Axillary Lymph Nodes: no lymphadenopathy present    Gastrointestinal  · Abdominal Examination: abdomen non-tender to palpation, normal bowel sounds, no masses present  · Liver and spleen: no hepatomegaly present, spleen not palpable  · Hernias: no hernias identified    Genitourinary  · External Genitalia: normal appearance for age, no discharge present, no tenderness present, no inflammatory lesions present, no masses present, no atrophy present  · Vagina: normal vaginal vault without central or paravaginal defects, no discharge present, no inflammatory lesions present, no masses present  · Bladder: non-tender to palpation  · Urethra: appears normal  · Cervix: normal   · Uterus: normal size, shape and consistency  · Adnexa: no adnexal tenderness present, no adnexal masses present  · Perineum: perineum within normal limits, no evidence of trauma, no rashes or skin lesions present    Skin  · General Inspection: no rash, no lesions identified    Neurologic/Psychiatric  · Mental Status:  · Orientation: grossly oriented to person, place and time  · Mood and Affect: mood normal, affect appropriate      Assessment/Plan:  28 y.o. G P A presenting for annual exam. Overall doing well.      Health Maintenance:  -counseled re: diet, exercise, healthy lifestyle  -pap/HPV  -STI screening  -Gardasil  -refer for mammo    Contraception:  -reviewed contraceptive options including LARCs    Current Problem:  -    RTC: 1 year for AE or sooner prn    [facility]  2/8/2022  9:08 AM             This encounter was created in error - please disregard. This encounter was created in error - please disregard. EOMI; PERRL; no drainage or redness

## 2022-02-10 ENCOUNTER — OUTPATIENT (OUTPATIENT)
Dept: OUTPATIENT SERVICES | Facility: HOSPITAL | Age: 37
LOS: 1 days | Discharge: HOME | End: 2022-02-10

## 2022-02-10 DIAGNOSIS — Z98.890 OTHER SPECIFIED POSTPROCEDURAL STATES: Chronic | ICD-10-CM

## 2022-02-10 DIAGNOSIS — Z90.89 ACQUIRED ABSENCE OF OTHER ORGANS: Chronic | ICD-10-CM

## 2022-02-10 DIAGNOSIS — Z98.51 TUBAL LIGATION STATUS: Chronic | ICD-10-CM

## 2022-02-10 DIAGNOSIS — J34.2 DEVIATED NASAL SEPTUM: Chronic | ICD-10-CM

## 2022-02-10 LAB — SARS-COV-2 RNA SPEC QL NAA+PROBE: SIGNIFICANT CHANGE UP

## 2022-02-10 NOTE — ASU PATIENT PROFILE, ADULT - AS SC BRADEN MOBILITY
----- Message from SRAVAN Ozuna sent at 2/10/2022 11:02 AM PST -----  Normal stress imaging. Reassure patient. SC   (4) no limitation

## 2022-02-23 ENCOUNTER — APPOINTMENT (OUTPATIENT)
Dept: OTOLARYNGOLOGY | Facility: CLINIC | Age: 37
End: 2022-02-23

## 2022-03-22 ENCOUNTER — NON-APPOINTMENT (OUTPATIENT)
Age: 37
End: 2022-03-22

## 2022-03-22 ENCOUNTER — APPOINTMENT (OUTPATIENT)
Dept: OBGYN | Facility: CLINIC | Age: 37
End: 2022-03-22
Payer: MEDICAID

## 2022-03-22 PROCEDURE — 99213 OFFICE O/P EST LOW 20 MIN: CPT

## 2022-10-31 NOTE — ED ADULT NURSE NOTE - OBJECTIVE STATEMENT
39
Pt had 1 syncopal episode and fell while coming out of bathroom today. Pt states pt hit her face, +LOC for approx 5 minutes. abrasion noted to face. Denies any other symptoms.

## 2022-12-01 ENCOUNTER — EMERGENCY (EMERGENCY)
Facility: HOSPITAL | Age: 37
LOS: 0 days | Discharge: HOME | End: 2022-12-02
Attending: STUDENT IN AN ORGANIZED HEALTH CARE EDUCATION/TRAINING PROGRAM | Admitting: STUDENT IN AN ORGANIZED HEALTH CARE EDUCATION/TRAINING PROGRAM
Payer: MEDICAID

## 2022-12-01 VITALS
TEMPERATURE: 97 F | OXYGEN SATURATION: 100 % | HEIGHT: 64 IN | SYSTOLIC BLOOD PRESSURE: 121 MMHG | RESPIRATION RATE: 18 BRPM | HEART RATE: 77 BPM | WEIGHT: 149.91 LBS | DIASTOLIC BLOOD PRESSURE: 68 MMHG

## 2022-12-01 DIAGNOSIS — Z98.51 TUBAL LIGATION STATUS: Chronic | ICD-10-CM

## 2022-12-01 DIAGNOSIS — R29.898 OTHER SYMPTOMS AND SIGNS INVOLVING THE MUSCULOSKELETAL SYSTEM: ICD-10-CM

## 2022-12-01 DIAGNOSIS — R11.10 VOMITING, UNSPECIFIED: ICD-10-CM

## 2022-12-01 DIAGNOSIS — G43.909 MIGRAINE, UNSPECIFIED, NOT INTRACTABLE, WITHOUT STATUS MIGRAINOSUS: ICD-10-CM

## 2022-12-01 DIAGNOSIS — Z87.891 PERSONAL HISTORY OF NICOTINE DEPENDENCE: ICD-10-CM

## 2022-12-01 DIAGNOSIS — Z90.89 ACQUIRED ABSENCE OF OTHER ORGANS: Chronic | ICD-10-CM

## 2022-12-01 DIAGNOSIS — J34.2 DEVIATED NASAL SEPTUM: Chronic | ICD-10-CM

## 2022-12-01 DIAGNOSIS — Z20.822 CONTACT WITH AND (SUSPECTED) EXPOSURE TO COVID-19: ICD-10-CM

## 2022-12-01 DIAGNOSIS — Z98.890 OTHER SPECIFIED POSTPROCEDURAL STATES: Chronic | ICD-10-CM

## 2022-12-01 DIAGNOSIS — Z86.79 PERSONAL HISTORY OF OTHER DISEASES OF THE CIRCULATORY SYSTEM: ICD-10-CM

## 2022-12-01 DIAGNOSIS — Z90.710 ACQUIRED ABSENCE OF BOTH CERVIX AND UTERUS: ICD-10-CM

## 2022-12-01 DIAGNOSIS — R07.89 OTHER CHEST PAIN: ICD-10-CM

## 2022-12-01 DIAGNOSIS — Z91.013 ALLERGY TO SEAFOOD: ICD-10-CM

## 2022-12-01 DIAGNOSIS — R06.02 SHORTNESS OF BREATH: ICD-10-CM

## 2022-12-01 DIAGNOSIS — R51.9 HEADACHE, UNSPECIFIED: ICD-10-CM

## 2022-12-01 LAB
ALBUMIN SERPL ELPH-MCNC: 5 G/DL — SIGNIFICANT CHANGE UP (ref 3.5–5.2)
ALP SERPL-CCNC: 68 U/L — SIGNIFICANT CHANGE UP (ref 30–115)
ALT FLD-CCNC: 17 U/L — SIGNIFICANT CHANGE UP (ref 0–41)
ANION GAP SERPL CALC-SCNC: 12 MMOL/L — SIGNIFICANT CHANGE UP (ref 7–14)
AST SERPL-CCNC: 19 U/L — SIGNIFICANT CHANGE UP (ref 0–41)
BASOPHILS # BLD AUTO: 0.06 K/UL — SIGNIFICANT CHANGE UP (ref 0–0.2)
BASOPHILS NFR BLD AUTO: 0.6 % — SIGNIFICANT CHANGE UP (ref 0–1)
BILIRUB SERPL-MCNC: 0.4 MG/DL — SIGNIFICANT CHANGE UP (ref 0.2–1.2)
BUN SERPL-MCNC: 10 MG/DL — SIGNIFICANT CHANGE UP (ref 10–20)
CALCIUM SERPL-MCNC: 9.7 MG/DL — SIGNIFICANT CHANGE UP (ref 8.4–10.5)
CHLORIDE SERPL-SCNC: 102 MMOL/L — SIGNIFICANT CHANGE UP (ref 98–110)
CO2 SERPL-SCNC: 25 MMOL/L — SIGNIFICANT CHANGE UP (ref 17–32)
CREAT SERPL-MCNC: 0.6 MG/DL — LOW (ref 0.7–1.5)
D DIMER BLD IA.RAPID-MCNC: <150 NG/ML DDU — SIGNIFICANT CHANGE UP (ref 0–230)
EGFR: 120 ML/MIN/1.73M2 — SIGNIFICANT CHANGE UP
EOSINOPHIL # BLD AUTO: 0.02 K/UL — SIGNIFICANT CHANGE UP (ref 0–0.7)
EOSINOPHIL NFR BLD AUTO: 0.2 % — SIGNIFICANT CHANGE UP (ref 0–8)
FLUAV AG NPH QL: SIGNIFICANT CHANGE UP
FLUBV AG NPH QL: SIGNIFICANT CHANGE UP
GLUCOSE SERPL-MCNC: 99 MG/DL — SIGNIFICANT CHANGE UP (ref 70–99)
HCG SERPL QL: NEGATIVE — SIGNIFICANT CHANGE UP
HCT VFR BLD CALC: 40.7 % — SIGNIFICANT CHANGE UP (ref 37–47)
HGB BLD-MCNC: 13.7 G/DL — SIGNIFICANT CHANGE UP (ref 12–16)
IMM GRANULOCYTES NFR BLD AUTO: 0.4 % — HIGH (ref 0.1–0.3)
LYMPHOCYTES # BLD AUTO: 1.74 K/UL — SIGNIFICANT CHANGE UP (ref 1.2–3.4)
LYMPHOCYTES # BLD AUTO: 17 % — LOW (ref 20.5–51.1)
MAGNESIUM SERPL-MCNC: 2 MG/DL — SIGNIFICANT CHANGE UP (ref 1.8–2.4)
MCHC RBC-ENTMCNC: 30.7 PG — SIGNIFICANT CHANGE UP (ref 27–31)
MCHC RBC-ENTMCNC: 33.7 G/DL — SIGNIFICANT CHANGE UP (ref 32–37)
MCV RBC AUTO: 91.3 FL — SIGNIFICANT CHANGE UP (ref 81–99)
MONOCYTES # BLD AUTO: 0.39 K/UL — SIGNIFICANT CHANGE UP (ref 0.1–0.6)
MONOCYTES NFR BLD AUTO: 3.8 % — SIGNIFICANT CHANGE UP (ref 1.7–9.3)
NEUTROPHILS # BLD AUTO: 7.99 K/UL — HIGH (ref 1.4–6.5)
NEUTROPHILS NFR BLD AUTO: 78 % — HIGH (ref 42.2–75.2)
NRBC # BLD: 0 /100 WBCS — SIGNIFICANT CHANGE UP (ref 0–0)
PLATELET # BLD AUTO: 207 K/UL — SIGNIFICANT CHANGE UP (ref 130–400)
POTASSIUM SERPL-MCNC: 4 MMOL/L — SIGNIFICANT CHANGE UP (ref 3.5–5)
POTASSIUM SERPL-SCNC: 4 MMOL/L — SIGNIFICANT CHANGE UP (ref 3.5–5)
PROT SERPL-MCNC: 7.4 G/DL — SIGNIFICANT CHANGE UP (ref 6–8)
RBC # BLD: 4.46 M/UL — SIGNIFICANT CHANGE UP (ref 4.2–5.4)
RBC # FLD: 11.9 % — SIGNIFICANT CHANGE UP (ref 11.5–14.5)
RSV RNA NPH QL NAA+NON-PROBE: SIGNIFICANT CHANGE UP
SARS-COV-2 RNA SPEC QL NAA+PROBE: SIGNIFICANT CHANGE UP
SODIUM SERPL-SCNC: 139 MMOL/L — SIGNIFICANT CHANGE UP (ref 135–146)
TROPONIN T SERPL-MCNC: <0.01 NG/ML — SIGNIFICANT CHANGE UP
WBC # BLD: 10.24 K/UL — SIGNIFICANT CHANGE UP (ref 4.8–10.8)
WBC # FLD AUTO: 10.24 K/UL — SIGNIFICANT CHANGE UP (ref 4.8–10.8)

## 2022-12-01 PROCEDURE — 99285 EMERGENCY DEPT VISIT HI MDM: CPT

## 2022-12-01 PROCEDURE — 70450 CT HEAD/BRAIN W/O DYE: CPT | Mod: 26,MA

## 2022-12-01 PROCEDURE — 70496 CT ANGIOGRAPHY HEAD: CPT | Mod: 26,MA,57

## 2022-12-01 PROCEDURE — 93010 ELECTROCARDIOGRAM REPORT: CPT

## 2022-12-01 PROCEDURE — 71045 X-RAY EXAM CHEST 1 VIEW: CPT | Mod: 26

## 2022-12-01 RX ORDER — METOCLOPRAMIDE HCL 10 MG
10 TABLET ORAL ONCE
Refills: 0 | Status: COMPLETED | OUTPATIENT
Start: 2022-12-01 | End: 2022-12-01

## 2022-12-01 RX ORDER — SODIUM CHLORIDE 9 MG/ML
1000 INJECTION, SOLUTION INTRAVENOUS ONCE
Refills: 0 | Status: COMPLETED | OUTPATIENT
Start: 2022-12-01 | End: 2022-12-01

## 2022-12-01 RX ORDER — MECLIZINE HCL 12.5 MG
50 TABLET ORAL ONCE
Refills: 0 | Status: COMPLETED | OUTPATIENT
Start: 2022-12-01 | End: 2022-12-01

## 2022-12-01 NOTE — ED ADULT TRIAGE NOTE - CHIEF COMPLAINT QUOTE
Pt reports daughter recently had RSV, pt c/o headache, SOB, cough, weakness starting a few hours ago. Pt reports photosensitivity to light, reports hx of migraines. No neuro deficits observed in triage, pt denies fever. Pt reports daughter recently had RSV, pt c/o headache, SOB, cough, vomiting and weakness starting a few hours ago. Pt reports photosensitivity to light, reports hx of migraines. No neuro deficits observed in triage, pt denies fever.

## 2022-12-02 RX ORDER — ACETAMINOPHEN 500 MG
650 TABLET ORAL ONCE
Refills: 0 | Status: COMPLETED | OUTPATIENT
Start: 2022-12-02 | End: 2022-12-02

## 2022-12-02 RX ADMIN — SODIUM CHLORIDE 1000 MILLILITER(S): 9 INJECTION, SOLUTION INTRAVENOUS at 00:35

## 2022-12-02 RX ADMIN — Medication 650 MILLIGRAM(S): at 00:35

## 2022-12-02 NOTE — ED PROVIDER NOTE - CLINICAL SUMMARY MEDICAL DECISION MAKING FREE TEXT BOX
Patient with hx as documented p/w headache. Unremarkable neurological exam. Labs normal. CT head normal, ct angio prelimary read with stable AVM. patient aware that rd is prelimary and may change in the morning. headache resolved. follow up with neurology. return precautions discussed in detail.

## 2022-12-02 NOTE — ED PROVIDER NOTE - PATIENT PORTAL LINK FT
You can access the FollowMyHealth Patient Portal offered by [facility] by registering at the following website: [email or web address]. By joining [employer]’s FollowMyHealth portal, you will also be able to view your health information using other applications (apps) compatible with our system.

## 2022-12-02 NOTE — ED PROVIDER NOTE - CARE PLAN
Principal Discharge DX:	Headache  Assessment and plan of treatment:	migraine r/o subarachnoid given AVM   labs, imaging, migraine cocktail, reassess   1

## 2022-12-02 NOTE — ED PROVIDER NOTE - CARE PROVIDER_API CALL
Joe Leo  NEUROLOGY  [address] [place] [address]  Phone: [phone]  Fax: [phone]  Follow Up Time:

## 2022-12-02 NOTE — ED PROVIDER NOTE - OBJECTIVE STATEMENT
38 yo F w/ hx of migraine, AVM, partial hysterectomy p/w sudden onset severe diffuse non-radiating headache that began around 4 pm associated with lower extremities weakness bilaterally, sob, chest tightness, and NBNB vomitus. No fevers.

## 2022-12-02 NOTE — ED ADULT NURSE NOTE - CHIEF COMPLAINT QUOTE
Pt reports daughter recently had RSV, pt c/o headache, SOB, cough, vomiting and weakness starting a few hours ago. Pt reports photosensitivity to light, reports hx of migraines. No neuro deficits observed in triage, pt denies fever.

## 2022-12-02 NOTE — ED ADULT NURSE NOTE - NSICDXPASTMEDICALHX_GEN_ALL_CORE_FT
PAST MEDICAL HISTORY:  Bronchitis diagnose january 23, 2020    Fall s/p fall January 23, 2020 went to ed, ct scan negative    Flu went to er january 23, 2020    Migraines     Polycystic ovaries     Vertigo

## 2022-12-02 NOTE — ED PROVIDER NOTE - NS ED ROS FT
Constitutional: No fevers.   Eyes:  No visual changes, eye pain or discharge.  ENMT:  nasal congestion.   Cardiac:  chest tightness.   Respiratory:  cough.   GI:  No nausea, vomiting, diarrhea or abdominal pain.  :  No dysuria, frequency or burning.  MS:  No myalgia, muscle weakness, joint pain or back pain.  Neuro:  headache.   Skin:  No skin rash.

## 2022-12-02 NOTE — ED PROVIDER NOTE - PHYSICAL EXAMINATION
CONSTITUTIONAL: Well-developed; well-nourished; in no acute distress.   SKIN: warm, dry  HEAD: Normocephalic; atraumatic.  EYES: PERRL, EOMI, no conjunctival erythema  ENT: No nasal discharge; airway clear.  NECK: Supple; non tender.  CARD: S1, S2 normal; no murmurs, gallops, or rubs. Regular rate and rhythm.   RESP: No wheezes, rales or rhonchi.  ABD: soft ntnd  NEURO: CN II-XII grossly intact, no facial asymmetry, no slurred speech. Strength 5/5 in upper and lower extremities. Sensation intact in all extremities. FNF testing normal. No pronator drift. Negative Rhombergs test. Normal gait.  PSYCH: Cooperative, appropriate.

## 2023-01-05 ENCOUNTER — EMERGENCY (EMERGENCY)
Facility: HOSPITAL | Age: 38
LOS: 0 days | Discharge: HOME | End: 2023-01-05
Attending: EMERGENCY MEDICINE | Admitting: EMERGENCY MEDICINE
Payer: MEDICAID

## 2023-01-05 VITALS
RESPIRATION RATE: 16 BRPM | DIASTOLIC BLOOD PRESSURE: 84 MMHG | OXYGEN SATURATION: 98 % | SYSTOLIC BLOOD PRESSURE: 112 MMHG | HEART RATE: 82 BPM

## 2023-01-05 VITALS
TEMPERATURE: 98 F | SYSTOLIC BLOOD PRESSURE: 119 MMHG | RESPIRATION RATE: 18 BRPM | DIASTOLIC BLOOD PRESSURE: 65 MMHG | HEART RATE: 74 BPM | WEIGHT: 149.91 LBS | OXYGEN SATURATION: 99 % | HEIGHT: 64 IN

## 2023-01-05 DIAGNOSIS — R19.7 DIARRHEA, UNSPECIFIED: ICD-10-CM

## 2023-01-05 DIAGNOSIS — Z98.51 TUBAL LIGATION STATUS: ICD-10-CM

## 2023-01-05 DIAGNOSIS — R50.9 FEVER, UNSPECIFIED: ICD-10-CM

## 2023-01-05 DIAGNOSIS — Z90.89 ACQUIRED ABSENCE OF OTHER ORGANS: Chronic | ICD-10-CM

## 2023-01-05 DIAGNOSIS — Z98.51 TUBAL LIGATION STATUS: Chronic | ICD-10-CM

## 2023-01-05 DIAGNOSIS — G43.909 MIGRAINE, UNSPECIFIED, NOT INTRACTABLE, WITHOUT STATUS MIGRAINOSUS: ICD-10-CM

## 2023-01-05 DIAGNOSIS — Z98.890 OTHER SPECIFIED POSTPROCEDURAL STATES: Chronic | ICD-10-CM

## 2023-01-05 DIAGNOSIS — R05.9 COUGH, UNSPECIFIED: ICD-10-CM

## 2023-01-05 DIAGNOSIS — R11.2 NAUSEA WITH VOMITING, UNSPECIFIED: ICD-10-CM

## 2023-01-05 DIAGNOSIS — M79.18 MYALGIA, OTHER SITE: ICD-10-CM

## 2023-01-05 DIAGNOSIS — Z91.013 ALLERGY TO SEAFOOD: ICD-10-CM

## 2023-01-05 DIAGNOSIS — J34.2 DEVIATED NASAL SEPTUM: Chronic | ICD-10-CM

## 2023-01-05 DIAGNOSIS — Z90.89 ACQUIRED ABSENCE OF OTHER ORGANS: ICD-10-CM

## 2023-01-05 DIAGNOSIS — Z90.12 ACQUIRED ABSENCE OF LEFT BREAST AND NIPPLE: ICD-10-CM

## 2023-01-05 DIAGNOSIS — Z87.891 PERSONAL HISTORY OF NICOTINE DEPENDENCE: ICD-10-CM

## 2023-01-05 LAB
ALBUMIN SERPL ELPH-MCNC: 4.8 G/DL — SIGNIFICANT CHANGE UP (ref 3.5–5.2)
ALP SERPL-CCNC: 60 U/L — SIGNIFICANT CHANGE UP (ref 30–115)
ALT FLD-CCNC: 13 U/L — SIGNIFICANT CHANGE UP (ref 0–41)
ANION GAP SERPL CALC-SCNC: 11 MMOL/L — SIGNIFICANT CHANGE UP (ref 7–14)
AST SERPL-CCNC: 22 U/L — SIGNIFICANT CHANGE UP (ref 0–41)
BASOPHILS # BLD AUTO: 0.05 K/UL — SIGNIFICANT CHANGE UP (ref 0–0.2)
BASOPHILS NFR BLD AUTO: 0.7 % — SIGNIFICANT CHANGE UP (ref 0–1)
BILIRUB SERPL-MCNC: 0.5 MG/DL — SIGNIFICANT CHANGE UP (ref 0.2–1.2)
BUN SERPL-MCNC: 12 MG/DL — SIGNIFICANT CHANGE UP (ref 10–20)
CALCIUM SERPL-MCNC: 9.5 MG/DL — SIGNIFICANT CHANGE UP (ref 8.4–10.4)
CHLORIDE SERPL-SCNC: 103 MMOL/L — SIGNIFICANT CHANGE UP (ref 98–110)
CO2 SERPL-SCNC: 23 MMOL/L — SIGNIFICANT CHANGE UP (ref 17–32)
CREAT SERPL-MCNC: 0.7 MG/DL — SIGNIFICANT CHANGE UP (ref 0.7–1.5)
EGFR: 115 ML/MIN/1.73M2 — SIGNIFICANT CHANGE UP
EOSINOPHIL # BLD AUTO: 0.04 K/UL — SIGNIFICANT CHANGE UP (ref 0–0.7)
EOSINOPHIL NFR BLD AUTO: 0.5 % — SIGNIFICANT CHANGE UP (ref 0–8)
FLUAV AG NPH QL: SIGNIFICANT CHANGE UP
FLUBV AG NPH QL: SIGNIFICANT CHANGE UP
GLUCOSE SERPL-MCNC: 76 MG/DL — SIGNIFICANT CHANGE UP (ref 70–99)
HCG SERPL QL: NEGATIVE — SIGNIFICANT CHANGE UP
HCT VFR BLD CALC: 40.7 % — SIGNIFICANT CHANGE UP (ref 37–47)
HGB BLD-MCNC: 14.3 G/DL — SIGNIFICANT CHANGE UP (ref 12–16)
IMM GRANULOCYTES NFR BLD AUTO: 0.3 % — SIGNIFICANT CHANGE UP (ref 0.1–0.3)
LYMPHOCYTES # BLD AUTO: 2.55 K/UL — SIGNIFICANT CHANGE UP (ref 1.2–3.4)
LYMPHOCYTES # BLD AUTO: 33.4 % — SIGNIFICANT CHANGE UP (ref 20.5–51.1)
MAGNESIUM SERPL-MCNC: 2.1 MG/DL — SIGNIFICANT CHANGE UP (ref 1.8–2.4)
MCHC RBC-ENTMCNC: 31.6 PG — HIGH (ref 27–31)
MCHC RBC-ENTMCNC: 35.1 G/DL — SIGNIFICANT CHANGE UP (ref 32–37)
MCV RBC AUTO: 89.8 FL — SIGNIFICANT CHANGE UP (ref 81–99)
MONOCYTES # BLD AUTO: 0.57 K/UL — SIGNIFICANT CHANGE UP (ref 0.1–0.6)
MONOCYTES NFR BLD AUTO: 7.5 % — SIGNIFICANT CHANGE UP (ref 1.7–9.3)
NEUTROPHILS # BLD AUTO: 4.4 K/UL — SIGNIFICANT CHANGE UP (ref 1.4–6.5)
NEUTROPHILS NFR BLD AUTO: 57.6 % — SIGNIFICANT CHANGE UP (ref 42.2–75.2)
NRBC # BLD: 0 /100 WBCS — SIGNIFICANT CHANGE UP (ref 0–0)
PLATELET # BLD AUTO: 198 K/UL — SIGNIFICANT CHANGE UP (ref 130–400)
POTASSIUM SERPL-MCNC: 4.9 MMOL/L — SIGNIFICANT CHANGE UP (ref 3.5–5)
POTASSIUM SERPL-SCNC: 4.9 MMOL/L — SIGNIFICANT CHANGE UP (ref 3.5–5)
PROT SERPL-MCNC: 7.4 G/DL — SIGNIFICANT CHANGE UP (ref 6–8)
RBC # BLD: 4.53 M/UL — SIGNIFICANT CHANGE UP (ref 4.2–5.4)
RBC # FLD: 11.9 % — SIGNIFICANT CHANGE UP (ref 11.5–14.5)
RSV RNA NPH QL NAA+NON-PROBE: SIGNIFICANT CHANGE UP
SARS-COV-2 RNA SPEC QL NAA+PROBE: SIGNIFICANT CHANGE UP
SODIUM SERPL-SCNC: 137 MMOL/L — SIGNIFICANT CHANGE UP (ref 135–146)
WBC # BLD: 7.63 K/UL — SIGNIFICANT CHANGE UP (ref 4.8–10.8)
WBC # FLD AUTO: 7.63 K/UL — SIGNIFICANT CHANGE UP (ref 4.8–10.8)

## 2023-01-05 PROCEDURE — 71046 X-RAY EXAM CHEST 2 VIEWS: CPT | Mod: 26

## 2023-01-05 PROCEDURE — 99284 EMERGENCY DEPT VISIT MOD MDM: CPT

## 2023-01-05 RX ORDER — SODIUM CHLORIDE 9 MG/ML
1000 INJECTION INTRAMUSCULAR; INTRAVENOUS; SUBCUTANEOUS ONCE
Refills: 0 | Status: COMPLETED | OUTPATIENT
Start: 2023-01-05 | End: 2023-01-05

## 2023-01-05 RX ORDER — ONDANSETRON 8 MG/1
1 TABLET, FILM COATED ORAL
Qty: 6 | Refills: 0
Start: 2023-01-05 | End: 2023-01-06

## 2023-01-05 RX ORDER — KETOROLAC TROMETHAMINE 30 MG/ML
30 SYRINGE (ML) INJECTION ONCE
Refills: 0 | Status: DISCONTINUED | OUTPATIENT
Start: 2023-01-05 | End: 2023-01-05

## 2023-01-05 RX ORDER — FAMOTIDINE 10 MG/ML
20 INJECTION INTRAVENOUS ONCE
Refills: 0 | Status: COMPLETED | OUTPATIENT
Start: 2023-01-05 | End: 2023-01-05

## 2023-01-05 RX ORDER — ONDANSETRON 8 MG/1
4 TABLET, FILM COATED ORAL ONCE
Refills: 0 | Status: COMPLETED | OUTPATIENT
Start: 2023-01-05 | End: 2023-01-05

## 2023-01-05 RX ORDER — ACETAMINOPHEN 500 MG
650 TABLET ORAL ONCE
Refills: 0 | Status: COMPLETED | OUTPATIENT
Start: 2023-01-05 | End: 2023-01-05

## 2023-01-05 RX ADMIN — FAMOTIDINE 20 MILLIGRAM(S): 10 INJECTION INTRAVENOUS at 19:01

## 2023-01-05 RX ADMIN — Medication 650 MILLIGRAM(S): at 19:01

## 2023-01-05 RX ADMIN — SODIUM CHLORIDE 1000 MILLILITER(S): 9 INJECTION INTRAMUSCULAR; INTRAVENOUS; SUBCUTANEOUS at 19:13

## 2023-01-05 RX ADMIN — Medication 650 MILLIGRAM(S): at 19:13

## 2023-01-05 RX ADMIN — ONDANSETRON 4 MILLIGRAM(S): 8 TABLET, FILM COATED ORAL at 17:20

## 2023-01-05 RX ADMIN — SODIUM CHLORIDE 2000 MILLILITER(S): 9 INJECTION INTRAMUSCULAR; INTRAVENOUS; SUBCUTANEOUS at 17:08

## 2023-01-05 RX ADMIN — Medication 30 MILLIGRAM(S): at 19:13

## 2023-01-05 RX ADMIN — Medication 30 MILLIGRAM(S): at 19:01

## 2023-01-05 NOTE — ED PROVIDER NOTE - PHYSICAL EXAMINATION
CONSTITUTIONAL: Well-appearing; well-nourished; in no apparent distress.   NECK: Supple; non-tender; no cervical lymphadenopathy.   CARDIOVASCULAR: Normal S1, S2; no murmurs, rubs, or gallops.   RESPIRATORY: Normal chest excursion with respiration; breath sounds clear and equal bilaterally; no wheezes, rhonchi, or rales.  GI/: Normal bowel sounds; non-distended; non-tender; no palpable organomegaly.   MS: No evidence of trauma or deformity. Normal ROM in all four extremities; non-tender to palpation; distal pulses are normal.   SKIN: Normal for age and race; warm; dry; good turgor; no apparent lesions or exudate.   NEURO/PSYCH: A & O x 4; grossly unremarkable. mood and manner are appropriate

## 2023-01-05 NOTE — ED PROVIDER NOTE - PROGRESS NOTE DETAILS
Pt feeling better, counseled - warning si/sxs d/w pt. Pt instructed to return to ed or f/u with PCP should sxs persist/worsen. Pt verbalizes an understanding & agreement with the plan as discussed

## 2023-01-05 NOTE — ED PROVIDER NOTE - ATTENDING APP SHARED VISIT CONTRIBUTION OF CARE
36 yo f with pmh of migraines, previous smoker, presents with c/o cough, bodyaches, n/v.  pt has been having fever and chills.  pt also c/o headache.  no focal numbness, weakness, facial droop, slurred speech or vision changes.  exam: nad, ncat, perrl, eomi, mmm, rrr, ctab, abd soft, nt, nd aox3, imp: pt with URI sx with n/v, benign abd exam, will check labs, ivf, cxr, sympotmatic tx

## 2023-01-05 NOTE — ED PROVIDER NOTE - NS ED ATTENDING STATEMENT MOD
This was a shared visit with the EMY. I reviewed and verified the documentation and independently performed the documented:

## 2023-01-05 NOTE — ED PROVIDER NOTE - NSFOLLOWUPINSTRUCTIONS_ED_ALL_ED_FT

## 2023-01-05 NOTE — ED PROVIDER NOTE - PATIENT PORTAL LINK FT
You can access the FollowMyHealth Patient Portal offered by [facility] by registering at the following website: [email or web address]. By joining [employer]’s FollowMyHealth portal, you will also be able to view your health information using other applications (apps) compatible with our system.

## 2023-01-05 NOTE — ED PROVIDER NOTE - NS ED ROS FT
Constitutional: no recent weight loss, change in appetite or malaise  Eyes: no redness/discharge/pain/vision changes  ENT: no rhinorrhea/ear pain/sore throat  Cardiac: No chest pain, SOB or edema.  Respiratory: No cough or respiratory distress  GI: No abdominal pain.  : No dysuria, frequency, urgency or hematuria  MS: no pain to back or extremities, no loss of ROM, no weakness  Neuro: No headache or weakness. No LOC.  Skin: No skin rash.  Except as documented in the HPI, all other systems are negative.

## 2023-01-05 NOTE — ED PROVIDER NOTE - NS_EDPROVIDERDISPOUSERTYPE_ED_A_ED
Message   Recorded as Task   Date: 12/02/2016 10:31 AM, Created By: Gerri Merlin   Task Name: Miscellaneous   Assigned To: Gerri Merlin   Regarding Patient: Yannick Linder, Status: In Progress   Trishaharmeet Broussard - 02 Dec 2016 10:31 AM     TASK CREATED  Oneil Sparks has a f/u dona't with you on 12/8, her last set of labs were on 7/25/2016  Would you like me to order any lab work? Satya Lucas - 05 Dec 2016 2:35 PM     TASK REPLIED TO: Previously Assigned To Satya Lucas  bmp/mg/p04/pth/spot urine/ cbc   Chela Veliz - 02 Dec 2016 3:09 PM     TASK IN PROGRESS   The above labs have been ordered  [facility] 12/2/2016      Active Problems    1  Acute cystitis without hematuria (595 0) (N30 00)   2  Atrial fibrillation (427 31) (I48 91)   3  Benign hypertensive CKD (403 10) (I12 9)   4  Breast lump (611 72) (N63)   5  Cardiomegaly (429 3) (I51 7)   6  Chronic kidney disease, stage 3 (585 3) (N18 3)   7  Diastolic dysfunction (424 9) (I51 9)   8  DJD (degenerative joint disease), multiple sites (715 89) (M15 9)   9  DM2 (diabetes mellitus, type 2) (250 00) (E11 9)   10  Encounter for screening mammogram for malignant neoplasm of breast (V76 12)    (Z12 31)   11  Esophageal reflux (530 81) (K21 9)   12  Esophagitis, reflux (530 11) (K21 0)   13  Fibromyalgia (729 1) (M79 7)   14  Fibrous breast lumps (611 72) (N63)   15  Gout (274 9) (M10 9)   16  Hypertension (401 9) (I10)   17  Hyperuricemia without signs inflammatory arthritis/tophaceous disease (790 6) (E79 0)   18  Knee pain (719 46) (M25 569)   19  MCI (mild cognitive impairment) (331 83) (G31 84)   20  Microscopic hematuria (599 72) (R31 29)   21  Mitral regurgitation (424 0) (I34 0)   22  Morbid or severe obesity due to excess calories (278 01) (E66 01)   23  Nephrolithiasis With Hyperuricuria (274 11)   24  Osteoarthritis (715 90) (M19 90)   25  Osteoporosis (733 00) (M81 0)   26  Other urinary incontinence (788 39) (N39 498)   27   Proteinuria (791 0) (R80 9)   28  Pulmonary artery hypertension (416 8) (I27 2)   29  Restless legs syndrome (333 94) (G25 81)   30  Secondary hyperparathyroidism, renal (588 81) (N25 81)   31  Tricuspid regurgitation (397 0) (I07 1)   32  Urinary frequency (788 41) (R35 0)   33  Vitamin D deficiency (268 9) (E55 9)    Current Meds   1  Aciphex 20 MG Oral Tablet Delayed Release (RABEprazole Sodium); TAKE 1 TABLET   DAILY; Therapy: (Jaki Riosch) to Recorded   2  Aspirin Low Dose 81 MG TABS; TAKE 1 TABLET DAILY; Therapy: (Ranny Porch) to Recorded   3  Citracal Plus Oral Tablet; TAKE 1 TABLET DAILY; Therapy: (Jaki Porch) to Recorded   4  Detrol 1 MG Oral Tablet (Tolterodine Tartrate); Take one tablet daily; Therapy: (Recorded:06May2014) to Recorded   5  FlavoxATE HCl - 100 MG Oral Tablet; Take one to 2 pills up to 3 times per day for urinary   symptoms; Therapy: [id number] to (Last Rx:[id number])  Requested for: [id number] Ordered   6  Indapamide 2 5 MG Oral Tablet; TAKE 1 TABLET DAILY; Therapy: (Jaki Porch) to Recorded   7  Levemir FlexPen 100 UNIT/ML SOLN; 9 units in the evening Recorded   8  Metoprolol Succinate ER 50 MG Oral Tablet Extended Release 24 Hour; Take 1 tablet   daily Recorded   9  Multi-Vitamin Oral Tablet; TAKE 1 TABLET DAILY; Therapy: (Jaki Porch) to Recorded   10  Tradjenta 5 MG Oral Tablet; Take 1 tablet daily Recorded   11  TraMADol HCl - 50 MG Oral Tablet; TAKE 1 TABLET 4 TIMES DAILY AS NEEDED    Recorded   12  Tylenol 325 MG Oral Tablet; TAKE 1 TO 2 TABLETS EVERY 6 HOURS AS NEEDED; Therapy: (Jaki Porch) to Recorded   13  Vicodin 5-500 MG TABS (Hydrocodone-Acetaminophen); TAKE 1 TABLET DAILY AS    NEEDED FOR PAIN;    Therapy: (Recorded:06May2014) to Recorded   14  Vitamin D3 1000 UNIT Oral Tablet; take 2 tablet daily; Therapy: (Jaki Porch) to Recorded   15  Warfarin Sodium 2 5 MG Oral Tablet; TAKE 1 TABLET EVERY OTHER DAY;     Therapy: (Recorded:31Glv5851) to Recorded   16  Warfarin Sodium 5 MG Oral Tablet; TAKE 1 TABLET EVERY OTHER DAY; Therapy: (Gabino Magdi) to Recorded    Allergies    1  Penicillins   2  Cephalosporins   3  HydroCHLOROthiazide TABS   4  Sulfa Drugs   5  Cardizem TABS   6  Latex Exam Gloves MISC    Plan  Atrial fibrillation, Benign hypertensive CKD, Chronic kidney disease, stage 3    · (1) CBC/ PLT (NO DIFF); Status:Active - Retrospective By Protocol Authorization; Requested for:[id number];   Chronic kidney disease, stage 3    · (1) BASIC METABOLIC PROFILE; Status:Active - Retrospective By Protocol  Authorization; Requested for:[id number];    · (1) MAGNESIUM; Status:Active - Retrospective By Protocol Authorization; Requested  for:[id number];    · (1) PHOSPHORUS; Status:Active - Retrospective By Protocol Authorization; Requested  for:[id number];   Chronic kidney disease, stage 3, Proteinuria    · (1) URINE PROTEIN CREATININE RATIO; Status:Active - Retrospective By Protocol  Authorization;  Requested for:[id number];     Signatures   Electronically signed by : JOHN Elizalde ; Dec  6 2016  4:21PM EST Attending Attestation (For Attendings USE Only)...

## 2023-01-05 NOTE — ED PROVIDER NOTE - CLINICAL SUMMARY MEDICAL DECISION MAKING FREE TEXT BOX
Pt with cough, n/v/d, chills, afebrile here, labs and imaging reassuring.  pt feeling improved after symptomatic tx, will dc to f/u with pmd outpt.  Pt was given strict return to ED precautions and pt indicated that he/she understood.

## 2023-03-21 ENCOUNTER — NON-APPOINTMENT (OUTPATIENT)
Age: 38
End: 2023-03-21

## 2023-03-21 ENCOUNTER — APPOINTMENT (OUTPATIENT)
Dept: OBGYN | Facility: CLINIC | Age: 38
End: 2023-03-21
Payer: MEDICAID

## 2023-03-21 DIAGNOSIS — Z01.419 ENCOUNTER FOR GYNECOLOGICAL EXAMINATION (GENERAL) (ROUTINE) W/OUT ABNORMAL FINDINGS: ICD-10-CM

## 2023-03-21 PROCEDURE — 99395 PREV VISIT EST AGE 18-39: CPT

## 2023-03-21 NOTE — HISTORY OF PRESENT ILLNESS
[FreeTextEntry1] : -38Y/O PARA 2 HERE FOR CHECK UP.\par ;HX OF CRYO;HGSIL;\par PMHX;BREAST BX BENIGN B/L\par PSHX;/BTL ENDOMETRIAL ABLATION, LAVH\par SOCIAL;-ETOH   -CIGG       STOPPED\par STD;   HPV/          DISCUSSED CONDOMS\par FAMILY HX OF BREAST CANCER;MATERNAL AUNT\par REVIEW OF SYMPTOMS DONE\par ALLERGIES;  Patient has answered NKDA\par Medication reconciliation was completed by reviewing, with the patient's\par involvement, the patient's current outpatient medications and those \par ordered for the patient today. \par \par PE;BREASTS;- MASSES DC NODES SBE\par ABDOMEN SOFT NT ND\par NL GENITALIA\par VAGINA DC \par CX REMOVED\par UTERUS REMOVED\par ADNEXA NT  - MASSES\par \par A;P;DOING WELL\par -PAP GC CHLAMYDIA\par -F-U PRN.\par

## 2023-04-25 ENCOUNTER — APPOINTMENT (OUTPATIENT)
Dept: OBGYN | Facility: CLINIC | Age: 38
End: 2023-04-25
Payer: MEDICAID

## 2023-04-25 PROCEDURE — 99213 OFFICE O/P EST LOW 20 MIN: CPT

## 2023-04-25 PROCEDURE — 76830 TRANSVAGINAL US NON-OB: CPT

## 2023-04-25 NOTE — HISTORY OF PRESENT ILLNESS
[FreeTextEntry1] : -36Y/O PARA 2 HERE C/O PELVIC CRAMPS.\par ;HX OF CRYO;HGSIL;\par PMHX;BREAST BX BENIGN B/L\par PSHX;/BTL ENDOMETRIAL ABLATION, LAVH\par SOCIAL;-ETOH   -CIGG       STOPPED\par STD;   HPV/          DISCUSSED CONDOMS\par FAMILY HX OF BREAST CANCER;MATERNAL AUNT\par REVIEW OF SYMPTOMS DONE\par ALLERGIES;  Patient has answered NKDA\par Medication reconciliation was completed by reviewing, with the patient's\par involvement, the patient's current outpatient medications and those \par ordered for the patient today. \par \par PE;\par ABDOMEN SOFT NT ND\par EXT -CCE\par \par A;PELVIC PAIN\par -SONO REVIEWED\par -SYMPTOM DIARY\par -ANSWERED QUESTIONS.\par

## 2023-04-28 ENCOUNTER — OUTPATIENT (OUTPATIENT)
Dept: OUTPATIENT SERVICES | Facility: HOSPITAL | Age: 38
LOS: 1 days | End: 2023-04-28
Payer: MEDICAID

## 2023-04-28 DIAGNOSIS — Z90.89 ACQUIRED ABSENCE OF OTHER ORGANS: Chronic | ICD-10-CM

## 2023-04-28 DIAGNOSIS — Z98.890 OTHER SPECIFIED POSTPROCEDURAL STATES: Chronic | ICD-10-CM

## 2023-04-28 DIAGNOSIS — R31.9 HEMATURIA, UNSPECIFIED: ICD-10-CM

## 2023-04-28 DIAGNOSIS — J34.2 DEVIATED NASAL SEPTUM: Chronic | ICD-10-CM

## 2023-04-28 DIAGNOSIS — Z00.8 ENCOUNTER FOR OTHER GENERAL EXAMINATION: ICD-10-CM

## 2023-04-28 PROCEDURE — 76770 US EXAM ABDO BACK WALL COMP: CPT

## 2023-04-28 PROCEDURE — 76770 US EXAM ABDO BACK WALL COMP: CPT | Mod: 26

## 2023-04-29 DIAGNOSIS — R31.9 HEMATURIA, UNSPECIFIED: ICD-10-CM

## 2023-05-16 NOTE — BRIEF OPERATIVE NOTE - CONDITION POST OP
stable Topical Ketoconazole Counseling: Patient counseled that this medication may cause skin irritation or allergic reactions.  In the event of skin irritation, the patient was advised to reduce the amount of the drug applied or use it less frequently.   The patient verbalized understanding of the proper use and possible adverse effects of ketoconazole.  All of the patient's questions and concerns were addressed.

## 2023-06-02 ENCOUNTER — APPOINTMENT (OUTPATIENT)
Dept: GASTROENTEROLOGY | Facility: CLINIC | Age: 38
End: 2023-06-02
Payer: MEDICAID

## 2023-06-02 ENCOUNTER — OUTPATIENT (OUTPATIENT)
Dept: OUTPATIENT SERVICES | Facility: HOSPITAL | Age: 38
LOS: 1 days | End: 2023-06-02
Payer: MEDICAID

## 2023-06-02 VITALS
HEIGHT: 64 IN | BODY MASS INDEX: 24.92 KG/M2 | SYSTOLIC BLOOD PRESSURE: 125 MMHG | HEART RATE: 76 BPM | DIASTOLIC BLOOD PRESSURE: 93 MMHG | WEIGHT: 146 LBS | TEMPERATURE: 97 F | OXYGEN SATURATION: 98 %

## 2023-06-02 DIAGNOSIS — Z98.51 TUBAL LIGATION STATUS: Chronic | ICD-10-CM

## 2023-06-02 DIAGNOSIS — Z87.42 PERSONAL HISTORY OF OTHER DISEASES OF THE FEMALE GENITAL TRACT: ICD-10-CM

## 2023-06-02 DIAGNOSIS — Z00.00 ENCOUNTER FOR GENERAL ADULT MEDICAL EXAMINATION WITHOUT ABNORMAL FINDINGS: ICD-10-CM

## 2023-06-02 DIAGNOSIS — R12 HEARTBURN: ICD-10-CM

## 2023-06-02 DIAGNOSIS — Z90.89 ACQUIRED ABSENCE OF OTHER ORGANS: Chronic | ICD-10-CM

## 2023-06-02 DIAGNOSIS — J34.2 DEVIATED NASAL SEPTUM: Chronic | ICD-10-CM

## 2023-06-02 DIAGNOSIS — Z98.890 OTHER SPECIFIED POSTPROCEDURAL STATES: Chronic | ICD-10-CM

## 2023-06-02 DIAGNOSIS — K59.00 CONSTIPATION, UNSPECIFIED: ICD-10-CM

## 2023-06-02 DIAGNOSIS — R10.9 UNSPECIFIED ABDOMINAL PAIN: ICD-10-CM

## 2023-06-02 PROCEDURE — 99204 OFFICE O/P NEW MOD 45 MIN: CPT | Mod: GC

## 2023-06-02 PROCEDURE — 99204 OFFICE O/P NEW MOD 45 MIN: CPT

## 2023-06-02 RX ORDER — POLYETHYLENE GLYCOL 3350 AND ELECTROLYTES WITH LEMON FLAVOR 236; 22.74; 6.74; 5.86; 2.97 G/4L; G/4L; G/4L; G/4L; G/4L
236 POWDER, FOR SOLUTION ORAL
Qty: 1 | Refills: 0 | Status: ACTIVE | COMMUNITY
Start: 2023-06-02 | End: 1900-01-01

## 2023-06-02 NOTE — PHYSICAL EXAM
[Alert] : alert [Hearing Threshold Finger Rub Not [place]] : hearing was normal [Normal Appearance] : the appearance of the neck was normal [No Respiratory Distress] : no respiratory distress [Normal S1, S2] : normal S1 and S2 [Abdomen Tenderness] : non-tender [Abdomen Soft] : soft [No CVA Tenderness] : no CVA  tenderness [Abnormal Walk] : normal gait [Oriented To Time, Place, And Person] : oriented to person, place, and time

## 2023-06-02 NOTE — HISTORY OF PRESENT ILLNESS
[FreeTextEntry1] : 37 year old female with hx of PCOS s/p hysterectomy, breast lump s/p resection, brain AVM 1 cm, presenting for lower abd pain and constipation. Patient reports that in jan of this year she came to ED due to nausea vomiting and diarrhea and was told she has gastroenteritis. she has been dealing with lower abdominal pain since she was diagnosed with PCOS. After the hysterectomy her pain did not resolve so she came to GI clinic. Patient also has constipation and intermittent acid reflux. no weight changes, diarrhea, dyspepsia, dysphagia, nausea, vomiting, or any other symptoms. she is ex smoker. no alcohol. intermittent NSAID use. no previous EGD or colono. no FHx of GI malignancy

## 2023-06-07 DIAGNOSIS — K59.00 CONSTIPATION, UNSPECIFIED: ICD-10-CM

## 2023-06-07 DIAGNOSIS — R12 HEARTBURN: ICD-10-CM

## 2023-06-07 DIAGNOSIS — R10.9 UNSPECIFIED ABDOMINAL PAIN: ICD-10-CM

## 2023-06-07 DIAGNOSIS — Z80.3 FAMILY HISTORY OF MALIGNANT NEOPLASM OF BREAST: ICD-10-CM

## 2023-06-07 DIAGNOSIS — Z87.42 PERSONAL HISTORY OF OTHER DISEASES OF THE FEMALE GENITAL TRACT: ICD-10-CM

## 2023-06-08 ENCOUNTER — NON-APPOINTMENT (OUTPATIENT)
Age: 38
End: 2023-06-08

## 2023-07-18 ENCOUNTER — EMERGENCY (EMERGENCY)
Facility: HOSPITAL | Age: 38
LOS: 0 days | Discharge: HOME | End: 2023-07-18
Attending: EMERGENCY MEDICINE
Payer: MEDICAID

## 2023-07-18 VITALS
SYSTOLIC BLOOD PRESSURE: 116 MMHG | RESPIRATION RATE: 16 BRPM | HEART RATE: 68 BPM | OXYGEN SATURATION: 98 % | DIASTOLIC BLOOD PRESSURE: 59 MMHG | TEMPERATURE: 98 F | HEIGHT: 65 IN | WEIGHT: 145.06 LBS

## 2023-07-18 DIAGNOSIS — Z90.89 ACQUIRED ABSENCE OF OTHER ORGANS: Chronic | ICD-10-CM

## 2023-07-18 DIAGNOSIS — R10.33 PERIUMBILICAL PAIN: ICD-10-CM

## 2023-07-18 DIAGNOSIS — Z87.442 PERSONAL HISTORY OF URINARY CALCULI: ICD-10-CM

## 2023-07-18 DIAGNOSIS — Z98.890 OTHER SPECIFIED POSTPROCEDURAL STATES: Chronic | ICD-10-CM

## 2023-07-18 DIAGNOSIS — Z87.891 PERSONAL HISTORY OF NICOTINE DEPENDENCE: ICD-10-CM

## 2023-07-18 DIAGNOSIS — Z91.013 ALLERGY TO SEAFOOD: ICD-10-CM

## 2023-07-18 DIAGNOSIS — Z98.51 TUBAL LIGATION STATUS: ICD-10-CM

## 2023-07-18 DIAGNOSIS — R11.2 NAUSEA WITH VOMITING, UNSPECIFIED: ICD-10-CM

## 2023-07-18 DIAGNOSIS — R16.0 HEPATOMEGALY, NOT ELSEWHERE CLASSIFIED: ICD-10-CM

## 2023-07-18 DIAGNOSIS — Z98.51 TUBAL LIGATION STATUS: Chronic | ICD-10-CM

## 2023-07-18 DIAGNOSIS — Z90.710 ACQUIRED ABSENCE OF BOTH CERVIX AND UTERUS: ICD-10-CM

## 2023-07-18 DIAGNOSIS — J34.2 DEVIATED NASAL SEPTUM: Chronic | ICD-10-CM

## 2023-07-18 DIAGNOSIS — G43.909 MIGRAINE, UNSPECIFIED, NOT INTRACTABLE, WITHOUT STATUS MIGRAINOSUS: ICD-10-CM

## 2023-07-18 LAB
ALBUMIN SERPL ELPH-MCNC: 4.3 G/DL — SIGNIFICANT CHANGE UP (ref 3.5–5.2)
ALP SERPL-CCNC: 58 U/L — SIGNIFICANT CHANGE UP (ref 30–115)
ALT FLD-CCNC: 14 U/L — SIGNIFICANT CHANGE UP (ref 0–41)
ANION GAP SERPL CALC-SCNC: 12 MMOL/L — SIGNIFICANT CHANGE UP (ref 7–14)
APPEARANCE UR: CLEAR — SIGNIFICANT CHANGE UP
AST SERPL-CCNC: 20 U/L — SIGNIFICANT CHANGE UP (ref 0–41)
BACTERIA # UR AUTO: NEGATIVE — SIGNIFICANT CHANGE UP
BASOPHILS # BLD AUTO: 0.07 K/UL — SIGNIFICANT CHANGE UP (ref 0–0.2)
BASOPHILS NFR BLD AUTO: 0.7 % — SIGNIFICANT CHANGE UP (ref 0–1)
BILIRUB SERPL-MCNC: <0.2 MG/DL — SIGNIFICANT CHANGE UP (ref 0.2–1.2)
BILIRUB UR-MCNC: NEGATIVE — SIGNIFICANT CHANGE UP
BUN SERPL-MCNC: 14 MG/DL — SIGNIFICANT CHANGE UP (ref 10–20)
CALCIUM SERPL-MCNC: 9.6 MG/DL — SIGNIFICANT CHANGE UP (ref 8.4–10.5)
CHLORIDE SERPL-SCNC: 102 MMOL/L — SIGNIFICANT CHANGE UP (ref 98–110)
CO2 SERPL-SCNC: 21 MMOL/L — SIGNIFICANT CHANGE UP (ref 17–32)
COLOR SPEC: SIGNIFICANT CHANGE UP
CREAT SERPL-MCNC: 0.7 MG/DL — SIGNIFICANT CHANGE UP (ref 0.7–1.5)
DIFF PNL FLD: ABNORMAL
EGFR: 115 ML/MIN/1.73M2 — SIGNIFICANT CHANGE UP
EOSINOPHIL # BLD AUTO: 0.17 K/UL — SIGNIFICANT CHANGE UP (ref 0–0.7)
EOSINOPHIL NFR BLD AUTO: 1.8 % — SIGNIFICANT CHANGE UP (ref 0–8)
EPI CELLS # UR: 8 /HPF — HIGH (ref 0–5)
GLUCOSE SERPL-MCNC: 81 MG/DL — SIGNIFICANT CHANGE UP (ref 70–99)
GLUCOSE UR QL: NEGATIVE — SIGNIFICANT CHANGE UP
HCT VFR BLD CALC: 39 % — SIGNIFICANT CHANGE UP (ref 37–47)
HGB BLD-MCNC: 13.2 G/DL — SIGNIFICANT CHANGE UP (ref 12–16)
HYALINE CASTS # UR AUTO: 4 /LPF — SIGNIFICANT CHANGE UP (ref 0–7)
IMM GRANULOCYTES NFR BLD AUTO: 0.3 % — SIGNIFICANT CHANGE UP (ref 0.1–0.3)
KETONES UR-MCNC: NEGATIVE — SIGNIFICANT CHANGE UP
LEUKOCYTE ESTERASE UR-ACNC: NEGATIVE — SIGNIFICANT CHANGE UP
LIDOCAIN IGE QN: 22 U/L — SIGNIFICANT CHANGE UP (ref 7–60)
LYMPHOCYTES # BLD AUTO: 3.15 K/UL — SIGNIFICANT CHANGE UP (ref 1.2–3.4)
LYMPHOCYTES # BLD AUTO: 32.7 % — SIGNIFICANT CHANGE UP (ref 20.5–51.1)
MCHC RBC-ENTMCNC: 31.1 PG — HIGH (ref 27–31)
MCHC RBC-ENTMCNC: 33.8 G/DL — SIGNIFICANT CHANGE UP (ref 32–37)
MCV RBC AUTO: 91.8 FL — SIGNIFICANT CHANGE UP (ref 81–99)
MONOCYTES # BLD AUTO: 0.74 K/UL — HIGH (ref 0.1–0.6)
MONOCYTES NFR BLD AUTO: 7.7 % — SIGNIFICANT CHANGE UP (ref 1.7–9.3)
NEUTROPHILS # BLD AUTO: 5.46 K/UL — SIGNIFICANT CHANGE UP (ref 1.4–6.5)
NEUTROPHILS NFR BLD AUTO: 56.8 % — SIGNIFICANT CHANGE UP (ref 42.2–75.2)
NITRITE UR-MCNC: NEGATIVE — SIGNIFICANT CHANGE UP
NRBC # BLD: 0 /100 WBCS — SIGNIFICANT CHANGE UP (ref 0–0)
PH UR: 6.5 — SIGNIFICANT CHANGE UP (ref 5–8)
PLATELET # BLD AUTO: 233 K/UL — SIGNIFICANT CHANGE UP (ref 130–400)
PMV BLD: 12.2 FL — HIGH (ref 7.4–10.4)
POTASSIUM SERPL-MCNC: 4.1 MMOL/L — SIGNIFICANT CHANGE UP (ref 3.5–5)
POTASSIUM SERPL-SCNC: 4.1 MMOL/L — SIGNIFICANT CHANGE UP (ref 3.5–5)
PROT SERPL-MCNC: 6.9 G/DL — SIGNIFICANT CHANGE UP (ref 6–8)
PROT UR-MCNC: NEGATIVE — SIGNIFICANT CHANGE UP
RBC # BLD: 4.25 M/UL — SIGNIFICANT CHANGE UP (ref 4.2–5.4)
RBC # FLD: 12.2 % — SIGNIFICANT CHANGE UP (ref 11.5–14.5)
RBC CASTS # UR COMP ASSIST: 4 /HPF — SIGNIFICANT CHANGE UP (ref 0–4)
SODIUM SERPL-SCNC: 135 MMOL/L — SIGNIFICANT CHANGE UP (ref 135–146)
SP GR SPEC: 1.01 — SIGNIFICANT CHANGE UP (ref 1.01–1.03)
UROBILINOGEN FLD QL: SIGNIFICANT CHANGE UP
WBC # BLD: 9.62 K/UL — SIGNIFICANT CHANGE UP (ref 4.8–10.8)
WBC # FLD AUTO: 9.62 K/UL — SIGNIFICANT CHANGE UP (ref 4.8–10.8)
WBC UR QL: 2 /HPF — SIGNIFICANT CHANGE UP (ref 0–5)

## 2023-07-18 PROCEDURE — 80053 COMPREHEN METABOLIC PANEL: CPT

## 2023-07-18 PROCEDURE — 36415 COLL VENOUS BLD VENIPUNCTURE: CPT

## 2023-07-18 PROCEDURE — 85025 COMPLETE CBC W/AUTO DIFF WBC: CPT

## 2023-07-18 PROCEDURE — 99284 EMERGENCY DEPT VISIT MOD MDM: CPT | Mod: 25

## 2023-07-18 PROCEDURE — 96374 THER/PROPH/DIAG INJ IV PUSH: CPT | Mod: XU

## 2023-07-18 PROCEDURE — 83690 ASSAY OF LIPASE: CPT

## 2023-07-18 PROCEDURE — 74177 CT ABD & PELVIS W/CONTRAST: CPT | Mod: MA

## 2023-07-18 PROCEDURE — 99285 EMERGENCY DEPT VISIT HI MDM: CPT

## 2023-07-18 PROCEDURE — 74177 CT ABD & PELVIS W/CONTRAST: CPT | Mod: 26,MA

## 2023-07-18 PROCEDURE — 81001 URINALYSIS AUTO W/SCOPE: CPT

## 2023-07-18 RX ORDER — KETOROLAC TROMETHAMINE 30 MG/ML
15 SYRINGE (ML) INJECTION ONCE
Refills: 0 | Status: DISCONTINUED | OUTPATIENT
Start: 2023-07-18 | End: 2023-07-18

## 2023-07-18 RX ORDER — SODIUM CHLORIDE 9 MG/ML
1000 INJECTION, SOLUTION INTRAVENOUS ONCE
Refills: 0 | Status: COMPLETED | OUTPATIENT
Start: 2023-07-18 | End: 2023-07-18

## 2023-07-18 RX ADMIN — SODIUM CHLORIDE 1000 MILLILITER(S): 9 INJECTION, SOLUTION INTRAVENOUS at 17:07

## 2023-07-18 RX ADMIN — Medication 15 MILLIGRAM(S): at 17:07

## 2023-07-18 RX ADMIN — Medication 15 MILLIGRAM(S): at 17:22

## 2023-07-18 NOTE — ED ADULT NURSE NOTE - NSFALLUNIVINTERV_ED_ALL_ED
Bed/Stretcher in lowest position, wheels locked, appropriate side rails in place/Call bell, personal items and telephone in reach/Instruct patient to call for assistance before getting out of bed/chair/stretcher/Non-slip footwear applied when patient is off stretcher/[place] to call system/Physically safe environment - no spills, clutter or unnecessary equipment/Purposeful proactive rounding/Room/bathroom lighting operational, light cord in reach

## 2023-07-18 NOTE — ED PROVIDER NOTE - PROGRESS NOTE DETAILS
Patient appears very well, labs unremarkable.  CT abdomen pelvis shows multiple liver lesions.  CT results including all incidental findings were discussed with the patient, copies of all test were provided to her.  She was advised to follow-up with her gastroenterologist, Dr Montesinos as soon as possible, she is already scheduled for colonoscopy in August.  Patient is aware that she needs additional work-up, verbalized understanding and is amenable with discharge plan.

## 2023-07-18 NOTE — ED PROVIDER NOTE - CARE PROVIDER_API CALL
Jluis Montesinos  Gastroenterology  [address] [place] [address]  Phone: [phone]  Fax: [phone]  Follow Up Time: Urgent

## 2023-07-18 NOTE — ED PROVIDER NOTE - DIFFERENTIAL DIAGNOSIS
Differential Diagnosis Kidney stone, acute appendicitis, hernia, diverticulitis, less likely ovarian pathology

## 2023-07-18 NOTE — ED PROVIDER NOTE - PATIENT PORTAL LINK FT
You can access the FollowMyHealth Patient Portal offered by [facility] by registering at the following website: [email or web address]. By joining [facility] FollowMyHealth portal, you will also be able to view your health information using other applications (apps) compatible with our system.

## 2023-07-18 NOTE — ED PROVIDER NOTE - PHYSICAL EXAMINATION
Vital Signs: I have reviewed the initial vital signs.  Constitutional: well-nourished, appears stated age, no acute distress  HEENT: PERRL, pink conjunctivae, mmm, nml phonation  Cardiovascular: regular rate, regular rhythm, well-perfused extremities, distal pulses intact  Respiratory: unlabored respiratory effort, clear to auscultation bilaterally, speaking full sentences  Gastrointestinal: soft, non-distended abdomen,  rt periumbilical ttp, no palpable mass, no CVA ttp  Musculoskeletal: supple neck, no lower extremity edema or unilateral calf ttp, no midline spine ttp  Integumentary: warm, dry, no rash  Neurologic: awake, alert, cranial nerves grossly intact, extremities’ motor and sensory functions grossly intact  Psychiatric: appropriate mood, appropriate affect
Amador Valle/spouse

## 2023-07-18 NOTE — ED ADULT TRIAGE NOTE - TEMPERATURE IN CELSIUS (DEGREES C)
Cellulitis  Cellulitis is an infection of the skin and the tissue beneath it. The infected area is usually red and tender. Cellulitis occurs most often in the arms and lower legs.   CAUSES   Cellulitis is caused by bacteria that enter the skin through cracks or cuts in the skin. The most common types of bacteria that cause cellulitis are staphylococci and streptococci.  SIGNS AND SYMPTOMS   · Redness and warmth.  · Swelling.  · Tenderness or pain.  · Fever.  DIAGNOSIS   Your health care provider can usually determine what is wrong based on a physical exam. Blood tests may also be done.  TREATMENT   Treatment usually involves taking an antibiotic medicine.  HOME CARE INSTRUCTIONS   · Take your antibiotic medicine as directed by your health care provider. Finish the antibiotic even if you start to feel better.  · Keep the infected arm or leg elevated to reduce swelling.  · Apply a warm cloth to the affected area up to 4 times per day to relieve pain.  · Take medicines only as directed by your health care provider.  · Keep all follow-up visits as directed by your health care provider.  SEEK MEDICAL CARE IF:   · You notice red streaks coming from the infected area.  · Your red area gets larger or turns dark in color.  · Your bone or joint underneath the infected area becomes painful after the skin has healed.  · Your infection returns in the same area or another area.  · You notice a swollen bump in the infected area.  · You develop new symptoms.  · You have a fever.  SEEK IMMEDIATE MEDICAL CARE IF:   · You feel very sleepy.  · You develop vomiting or diarrhea.  · You have a general ill feeling (malaise) with muscle aches and pains.  MAKE SURE YOU:   · Understand these instructions.  · Will watch your condition.  · Will get help right away if you are not doing well or get worse.     This information is not intended to replace advice given to you by your health care provider. Make sure you discuss any questions you have  with your health care provider.     Document Released: 09/27/2006 Document Revised: 01/08/2016 Document Reviewed: 03/04/2013  [employer] Interactive Patient Education ©2016 [employer]  Hives  Hives are itchy, red, swollen areas of the skin. They can vary in size and location on your body. Hives can come and go for hours or several days (acute hives) or for several weeks (chronic hives). Hives do not spread from person to person (noncontagious). They may get worse with scratching, exercise, and emotional stress.  CAUSES   · Allergic reaction to food, additives, or drugs.  · Infections, including the common cold.  · Illness, such as vasculitis, lupus, or thyroid disease.  · Exposure to sunlight, heat, or cold.  · Exercise.  · Stress.  · Contact with chemicals.  SYMPTOMS   · Red or white swollen patches on the skin. The patches may change size, shape, and location quickly and repeatedly.  · Itching.  · Swelling of the hands, feet, and face. This may occur if hives develop deeper in the skin.  DIAGNOSIS   Your caregiver can usually tell what is wrong by performing a physical exam. Skin or blood tests may also be done to determine the cause of your hives. In some cases, the cause cannot be determined.  TREATMENT   Mild cases usually get better with medicines such as antihistamines. Severe cases may require an emergency epinephrine injection. If the cause of your hives is known, treatment includes avoiding that trigger.   HOME CARE INSTRUCTIONS   · Avoid causes that trigger your hives.  · Take antihistamines as directed by your caregiver to reduce the severity of your hives. Non-sedating or low-sedating antihistamines are usually recommended. Do not drive while taking an antihistamine.  · Take any other medicines prescribed for itching as directed by your caregiver.  · Wear loose-fitting clothing.  · Keep all follow-up appointments as directed by your caregiver.  SEEK MEDICAL CARE IF:   · You have persistent or severe  itching that is not relieved with medicine.  · You have painful or swollen joints.  SEEK IMMEDIATE MEDICAL CARE IF:   · You have a fever.  · Your tongue or lips are swollen.  · You have trouble breathing or swallowing.  · You feel tightness in the throat or chest.  · You have abdominal pain.  These problems may be the first sign of a life-threatening allergic reaction. Call your local emergency services (911 in [place]  MAKE SURE YOU:   · Understand these instructions.  · Will watch your condition.  · Will get help right away if you are not doing well or get worse.     This information is not intended to replace advice given to you by your health care provider. Make sure you discuss any questions you have with your health care provider.     Document Released: 12/18/2006 Document Revised: 12/23/2014 Document Reviewed: 03/12/2013  [employer] Interactive Patient Education ©2016 [employer]     36.8

## 2023-07-18 NOTE — ED PROVIDER NOTE - CLINICAL SUMMARY MEDICAL DECISION MAKING FREE TEXT BOX
37-year-old female presenting for evaluation of nausea vomiting abdominal pain.  Labs ordered reviewed, appear unremarkable.  Vital signs normal.  CT abdomen pelvis shows new liver lesions.  Results of all test were discussed with the patient in detail, she was advised to follow-up with her gastroenterologist soon as possible for further work-up.  She is already scheduled for colonoscopy in August.  She appears very well, she was given opportunity to ask questions and is amenable to discharge plan.  Strict return precautions given.

## 2023-07-18 NOTE — ED PROVIDER NOTE - NSFOLLOWUPINSTRUCTIONS_ED_ALL_ED_FT
Abdominal Pain, Adult  Abdominal pain can be caused by many things. Often, abdominal pain is not serious and it gets better with no treatment or by being treated at home. However, sometimes abdominal pain is serious. Your health care provider will do a medical history and a physical exam to try to determine the cause of your abdominal pain.    Follow these instructions at home:  Take over-the-counter and prescription medicines only as told by your health care provider. Do not take a laxative unless told by your health care provider.  ImageDrink enough fluid to keep your urine clear or pale yellow.  Watch your condition for any changes.  Keep all follow-up visits as told by your health care provider. This is important.  Contact a health care provider if:  Your abdominal pain changes or gets worse.  You are not hungry or you lose weight without trying.  You are constipated or have diarrhea for more than 2–3 days.  You have pain when you urinate or have a bowel movement.  Your abdominal pain wakes you up at night.  Your pain gets worse with meals, after eating, or with certain foods.  You are throwing up and cannot keep anything down.  You have a fever.  Get help right away if:  Your pain does not go away as soon as your health care provider told you to expect.  You cannot stop throwing up.  Your pain is only in areas of the abdomen, such as the right side or the left lower portion of the abdomen.  You have bloody or black stools, or stools that look like tar.  You have severe pain, cramping, or bloating in your abdomen.  You have signs of dehydration, such as:    Dark urine, very little urine, or no urine.  Cracked lips.  Dry mouth.  Sunken eyes.  Sleepiness.  Weakness.    This information is not intended to replace advice given to you by your health care provider. Make sure you discuss any questions you have with your health care provider    Please follow-up with your gastroenterologist as soon as possible.

## 2023-07-18 NOTE — ED PROVIDER NOTE - OBJECTIVE STATEMENT
37-year-old female PMH migraine headaches, history of tubal ligation, PCOS, vertigo, kidney stones, history of a hysterectomy for dysfunctional uterine bleeding presenting for evaluation of periumbilical pain radiating to the right flank associated with nonbloody loose bowel movements for the past 4 days.  In addition, patient reports history of diverticulosis.  States that she saw a gastroenterologist a few months ago, was supposed to get a CT abdomen pelvis, however, she lost her insurance and was unable to get the study done.  Came to  the emergency room today due to recurrence of pain/new symptoms.  Denies any associated fever or chills, no vomiting, no hematuria, no abnormal vaginal discharge/bleeding, no weight loss or any other additional complaints.  Patient is a former smoker, denies any drug use, no heavy alcohol use.

## 2023-07-20 ENCOUNTER — OUTPATIENT (OUTPATIENT)
Dept: INPATIENT UNIT | Facility: HOSPITAL | Age: 38
LOS: 1 days | Discharge: HOME | End: 2023-07-20
Payer: MEDICAID

## 2023-07-20 ENCOUNTER — TRANSCRIPTION ENCOUNTER (OUTPATIENT)
Age: 38
End: 2023-07-20

## 2023-07-20 ENCOUNTER — RESULT REVIEW (OUTPATIENT)
Age: 38
End: 2023-07-20

## 2023-07-20 VITALS
DIASTOLIC BLOOD PRESSURE: 58 MMHG | RESPIRATION RATE: 18 BRPM | SYSTOLIC BLOOD PRESSURE: 102 MMHG | HEART RATE: 62 BPM | TEMPERATURE: 98 F | WEIGHT: 147.05 LBS | HEIGHT: 65 IN

## 2023-07-20 VITALS
OXYGEN SATURATION: 99 % | SYSTOLIC BLOOD PRESSURE: 107 MMHG | HEART RATE: 70 BPM | RESPIRATION RATE: 18 BRPM | DIASTOLIC BLOOD PRESSURE: 83 MMHG

## 2023-07-20 DIAGNOSIS — K59.00 CONSTIPATION, UNSPECIFIED: ICD-10-CM

## 2023-07-20 DIAGNOSIS — Z90.89 ACQUIRED ABSENCE OF OTHER ORGANS: Chronic | ICD-10-CM

## 2023-07-20 DIAGNOSIS — Z98.890 OTHER SPECIFIED POSTPROCEDURAL STATES: Chronic | ICD-10-CM

## 2023-07-20 DIAGNOSIS — J34.2 DEVIATED NASAL SEPTUM: Chronic | ICD-10-CM

## 2023-07-20 DIAGNOSIS — Z98.51 TUBAL LIGATION STATUS: Chronic | ICD-10-CM

## 2023-07-20 DIAGNOSIS — Z90.710 ACQUIRED ABSENCE OF BOTH CERVIX AND UTERUS: Chronic | ICD-10-CM

## 2023-07-20 DIAGNOSIS — R10.9 UNSPECIFIED ABDOMINAL PAIN: ICD-10-CM

## 2023-07-20 PROCEDURE — 45385 COLONOSCOPY W/LESION REMOVAL: CPT

## 2023-07-20 PROCEDURE — 88305 TISSUE EXAM BY PATHOLOGIST: CPT | Mod: 26

## 2023-07-20 PROCEDURE — 88312 SPECIAL STAINS GROUP 1: CPT

## 2023-07-20 PROCEDURE — 88312 SPECIAL STAINS GROUP 1: CPT | Mod: 26

## 2023-07-20 PROCEDURE — 88305 TISSUE EXAM BY PATHOLOGIST: CPT

## 2023-07-20 PROCEDURE — 43239 EGD BIOPSY SINGLE/MULTIPLE: CPT

## 2023-07-20 NOTE — PRE-ANESTHESIA EVALUATION ADULT - RESPIRATORY RATE (BREATHS/MIN)
Received form(s) from [employer] for Signature.  Placed form(s) in/on SN's box.  Forms need to be signed and faxed to [phone].    Call pt to verify form was sent: No  Copy needs to be sent for scanning after completion: Yes         18

## 2023-07-20 NOTE — ASU PATIENT PROFILE, ADULT - FALL HARM RISK - UNIVERSAL INTERVENTIONS
Bed in lowest position, wheels locked, appropriate side rails in place/Call bell, personal items and telephone in reach/Instruct patient to call for assistance before getting out of bed or chair/Non-slip footwear when patient is out of bed/[place] to call system/Physically safe environment - no spills, clutter or unnecessary equipment/Purposeful Proactive Rounding/Room/bathroom lighting operational, light cord in reach

## 2023-07-20 NOTE — ASU PATIENT PROFILE, ADULT - NSICDXPASTSURGICALHX_GEN_ALL_CORE_FT
PAST SURGICAL HISTORY:  Deviated septum and adnoids removed 2017    H/O lumpectomy left breast 2001  right breast 2007    H/O: hysterectomy     History of tonsillectomy 1998    History of tubal ligation 2016    Status post endometrial ablation 2016

## 2023-07-20 NOTE — ASU DISCHARGE PLAN (ADULT/PEDIATRIC) - ASU DC SPECIAL INSTRUCTIONSFT
- Follow up with our [facility] located at [address] [place] [address]. Phone Number: [phone]

## 2023-07-20 NOTE — CHART NOTE - NSCHARTNOTEFT_GEN_A_CORE
PACU ANESTHESIA ADMISSION NOTE      Procedure: EGD colonoscopy  Post op diagnosis:      ____  Intubated  TV:______       Rate: ______      FiO2: ______    _x___  Patent Airway    _x___  Full return of protective reflexes    _x___  Full recovery from anesthesia / back to baseline status    Vitals:            T:  97              BP :   89/52             R: 19             Sat:    100           P:71      Mental Status:  _x___ Awake   _____ Alert   _____ Drowsy   _____ Sedated    Nausea/Vomiting:  _x___  NO       ______Yes,   See Post - Op Orders         Pain Scale (0-10):  __0___    Treatment: _x___ None    ____ See Post - Op/PCA Orders    Post - Operative Fluids:   __x__ Oral   ____ See Post - Op Orders    Plan: Discharge:   _x___Home       _____Floor     _____Critical Care    _____  Other:_________________    Comments:  No anesthesia issues or complications noted.  Discharge when criteria met.

## 2023-07-20 NOTE — ASU PATIENT PROFILE, ADULT - NSICDXPASTMEDICALHX_GEN_ALL_CORE_FT
PAST MEDICAL HISTORY:  Bronchitis diagnose january 23, 2020    Diverticulosis     Fall s/p fall January 23, 2020 went to ed, ct scan negative    Flu went to er january 23, 2020    Migraines     Polycystic ovaries     Vertigo

## 2023-07-20 NOTE — H&P PST ADULT - ASSESSMENT
37 year old female with hx of PCOS s/p hysterectomy, breast lump s/p resection, brain AVM 1 cm, presenting for lower abd pain and constipation here for colonoscopy for constipation and abdominal pain

## 2023-07-21 PROBLEM — K57.90 DIVERTICULOSIS OF INTESTINE, PART UNSPECIFIED, WITHOUT PERFORATION OR ABSCESS WITHOUT BLEEDING: Chronic | Status: ACTIVE | Noted: 2023-07-20

## 2023-07-21 LAB
SURGICAL PATHOLOGY STUDY: SIGNIFICANT CHANGE UP
SURGICAL PATHOLOGY STUDY: SIGNIFICANT CHANGE UP

## 2023-07-24 ENCOUNTER — APPOINTMENT (OUTPATIENT)
Dept: OBGYN | Facility: CLINIC | Age: 38
End: 2023-07-24
Payer: MEDICAID

## 2023-07-24 ENCOUNTER — TRANSCRIPTION ENCOUNTER (OUTPATIENT)
Age: 38
End: 2023-07-24

## 2023-07-24 DIAGNOSIS — N83.209 UNSPECIFIED OVARIAN CYST, UNSPECIFIED SIDE: ICD-10-CM

## 2023-07-24 DIAGNOSIS — R10.9 UNSPECIFIED ABDOMINAL PAIN: ICD-10-CM

## 2023-07-24 DIAGNOSIS — K62.1 RECTAL POLYP: ICD-10-CM

## 2023-07-24 DIAGNOSIS — Z91.013 ALLERGY TO SEAFOOD: ICD-10-CM

## 2023-07-24 DIAGNOSIS — K64.4 RESIDUAL HEMORRHOIDAL SKIN TAGS: ICD-10-CM

## 2023-07-24 DIAGNOSIS — K29.50 UNSPECIFIED CHRONIC GASTRITIS WITHOUT BLEEDING: ICD-10-CM

## 2023-07-24 DIAGNOSIS — Z79.1 LONG TERM (CURRENT) USE OF NON-STEROIDAL ANTI-INFLAMMATORIES (NSAID): ICD-10-CM

## 2023-07-24 DIAGNOSIS — G43.909 MIGRAINE, UNSPECIFIED, NOT INTRACTABLE, WITHOUT STATUS MIGRAINOSUS: ICD-10-CM

## 2023-07-24 DIAGNOSIS — R10.2 PELVIC AND PERINEAL PAIN: ICD-10-CM

## 2023-07-24 DIAGNOSIS — R42 DIZZINESS AND GIDDINESS: ICD-10-CM

## 2023-07-24 PROCEDURE — 99213 OFFICE O/P EST LOW 20 MIN: CPT

## 2023-07-24 RX ORDER — IBUPROFEN 800 MG/1
800 TABLET, FILM COATED ORAL 3 TIMES DAILY
Qty: 30 | Refills: 1 | Status: ACTIVE | COMMUNITY
Start: 2023-07-24 | End: 1900-01-01

## 2023-07-24 NOTE — HISTORY OF PRESENT ILLNESS
[FreeTextEntry1] : -36 Y/O PARA 2 HERE C/O PELVIC PAIN;PT WAS SEEN IN ER;LEFT OVARIAN CYST SEEN IN CT SCAN\par ;HX OF CRYO;HGSIL;\par PMHX;BREAST BX BENIGN B/L\par PSHX;/BTL ENDOMETRIAL ABLATION, LAVH\par SOCIAL;-ETOH   -CIGG       STOPPED\par STD;   HPV/          DISCUSSED CONDOMS\par FAMILY HX OF BREAST CANCER;MATERNAL AUNT\par REVIEW OF SYMPTOMS DONE\par ALLERGIES;  Patient has answered NKDA\par Medication reconciliation was completed by reviewing, with the patient's\par involvement, the patient's current outpatient medications and those \par ordered for the patient today. \par \par PE;\par ABDOMEN SOFT NT ND\par EXT -CCE\par \par A;PELVIC PAIN;OVARIAN CYST\par -SONO\par -MRI ORDERED BY GI\par -MOTRIN PRN\par -F-U PRN.\par

## 2023-07-27 ENCOUNTER — APPOINTMENT (OUTPATIENT)
Dept: OBGYN | Facility: CLINIC | Age: 38
End: 2023-07-27
Payer: MEDICAID

## 2023-07-27 PROCEDURE — 99213 OFFICE O/P EST LOW 20 MIN: CPT

## 2023-07-27 PROCEDURE — 76830 TRANSVAGINAL US NON-OB: CPT

## 2023-07-27 NOTE — HISTORY OF PRESENT ILLNESS
[FreeTextEntry1] : -38 Y/O PARA 2 HERE C/O PELVIC PAIN;PT WAS SEEN IN ER;LEFT OVARIAN CYST SEEN IN CT SCAN\par SONO TODAY\par ;HX OF CRYO;HGSIL;\par PMHX;BREAST BX BENIGN B/L\par PSHX;/BTL ENDOMETRIAL ABLATION, LAVH\par SOCIAL;-ETOH   -CIGG       STOPPED\par STD;   HPV/          DISCUSSED CONDOMS\par FAMILY HX OF BREAST CANCER;MATERNAL AUNT\par REVIEW OF SYMPTOMS DONE\par ALLERGIES;  Patient has answered NKDA\par Medication reconciliation was completed by reviewing, with the patient's\par involvement, the patient's current outpatient medications and those \par ordered for the patient today. \par \par PE;\par ABDOMEN SOFT NT ND\par EXT -CCE\par \par A;PELVIC PAIN;OVARIAN CYST\par -SONO REVIEWED\par -MRI ORDERED BY GI\par -MOTRIN PRN\par -F-U AFTER ABOVE.\par

## 2023-09-10 ENCOUNTER — OUTPATIENT (OUTPATIENT)
Dept: OUTPATIENT SERVICES | Facility: HOSPITAL | Age: 38
LOS: 1 days | End: 2023-09-10
Payer: MEDICAID

## 2023-09-10 ENCOUNTER — RESULT REVIEW (OUTPATIENT)
Age: 38
End: 2023-09-10

## 2023-09-10 DIAGNOSIS — Z98.51 TUBAL LIGATION STATUS: Chronic | ICD-10-CM

## 2023-09-10 DIAGNOSIS — J34.2 DEVIATED NASAL SEPTUM: Chronic | ICD-10-CM

## 2023-09-10 DIAGNOSIS — Z00.8 ENCOUNTER FOR OTHER GENERAL EXAMINATION: ICD-10-CM

## 2023-09-10 DIAGNOSIS — Z98.890 OTHER SPECIFIED POSTPROCEDURAL STATES: Chronic | ICD-10-CM

## 2023-09-10 DIAGNOSIS — Z90.89 ACQUIRED ABSENCE OF OTHER ORGANS: Chronic | ICD-10-CM

## 2023-09-10 DIAGNOSIS — Z90.710 ACQUIRED ABSENCE OF BOTH CERVIX AND UTERUS: Chronic | ICD-10-CM

## 2023-09-10 DIAGNOSIS — R10.9 UNSPECIFIED ABDOMINAL PAIN: ICD-10-CM

## 2023-09-10 PROCEDURE — 74183 MRI ABD W/O CNTR FLWD CNTR: CPT

## 2023-09-10 PROCEDURE — 74183 MRI ABD W/O CNTR FLWD CNTR: CPT | Mod: 26

## 2023-09-10 PROCEDURE — A9579: CPT

## 2023-09-11 ENCOUNTER — NON-APPOINTMENT (OUTPATIENT)
Age: 38
End: 2023-09-11

## 2023-09-11 DIAGNOSIS — R93.5 ABNORMAL FINDINGS ON DIAGNOSTIC IMAGING OF OTHER ABDOMINAL REGIONS, INCLUDING RETROPERITONEUM: ICD-10-CM

## 2023-09-11 DIAGNOSIS — R10.33 PERIUMBILICAL PAIN: ICD-10-CM

## 2023-09-11 DIAGNOSIS — R10.9 UNSPECIFIED ABDOMINAL PAIN: ICD-10-CM

## 2023-09-11 PROCEDURE — ZZZZZ: CPT

## 2023-09-12 ENCOUNTER — OUTPATIENT (OUTPATIENT)
Dept: OUTPATIENT SERVICES | Facility: HOSPITAL | Age: 38
LOS: 1 days | End: 2023-09-12
Payer: MEDICAID

## 2023-09-12 DIAGNOSIS — Z90.89 ACQUIRED ABSENCE OF OTHER ORGANS: Chronic | ICD-10-CM

## 2023-09-12 DIAGNOSIS — J34.2 DEVIATED NASAL SEPTUM: Chronic | ICD-10-CM

## 2023-09-12 DIAGNOSIS — Z98.890 OTHER SPECIFIED POSTPROCEDURAL STATES: Chronic | ICD-10-CM

## 2023-09-12 DIAGNOSIS — Z98.51 TUBAL LIGATION STATUS: Chronic | ICD-10-CM

## 2023-09-12 DIAGNOSIS — R93.5 ABNORMAL FINDINGS ON DIAGNOSTIC IMAGING OF OTHER ABDOMINAL REGIONS, INCLUDING RETROPERITONEUM: ICD-10-CM

## 2023-09-12 DIAGNOSIS — Z90.710 ACQUIRED ABSENCE OF BOTH CERVIX AND UTERUS: Chronic | ICD-10-CM

## 2023-09-13 DIAGNOSIS — R93.5 ABNORMAL FINDINGS ON DIAGNOSTIC IMAGING OF OTHER ABDOMINAL REGIONS, INCLUDING RETROPERITONEUM: ICD-10-CM

## 2023-09-14 LAB
AFP-TM SERPL-MCNC: <1.8 NG/ML
ALBUMIN SERPL ELPH-MCNC: 4.5 G/DL
ALP BLD-CCNC: 56 U/L
ALT SERPL-CCNC: 15 U/L
ANION GAP SERPL CALC-SCNC: 16 MMOL/L
AST SERPL-CCNC: 16 U/L
BILIRUB SERPL-MCNC: <0.2 MG/DL
BUN SERPL-MCNC: 16 MG/DL
CALCIUM SERPL-MCNC: 9.5 MG/DL
CANCER AG19-9 SERPL-ACNC: 5 U/ML
CEA SERPL-MCNC: <0.6 NG/ML
CHLORIDE SERPL-SCNC: 99 MMOL/L
CO2 SERPL-SCNC: 23 MMOL/L
CREAT SERPL-MCNC: 0.7 MG/DL
EGFR: 115 ML/MIN/1.73M2
GLUCOSE SERPL-MCNC: 97 MG/DL
POTASSIUM SERPL-SCNC: 4.1 MMOL/L
PROT SERPL-MCNC: 7.1 G/DL
SODIUM SERPL-SCNC: 138 MMOL/L

## 2023-09-25 ENCOUNTER — OUTPATIENT (OUTPATIENT)
Dept: OUTPATIENT SERVICES | Facility: HOSPITAL | Age: 38
LOS: 1 days | End: 2023-09-25
Payer: MEDICAID

## 2023-09-25 ENCOUNTER — APPOINTMENT (OUTPATIENT)
Dept: SURGERY | Facility: CLINIC | Age: 38
End: 2023-09-25
Payer: MEDICAID

## 2023-09-25 ENCOUNTER — APPOINTMENT (OUTPATIENT)
Age: 38
End: 2023-09-25
Payer: MEDICAID

## 2023-09-25 VITALS
BODY MASS INDEX: 24.92 KG/M2 | SYSTOLIC BLOOD PRESSURE: 105 MMHG | WEIGHT: 146 LBS | OXYGEN SATURATION: 98 % | DIASTOLIC BLOOD PRESSURE: 73 MMHG | TEMPERATURE: 96.6 F | HEIGHT: 64 IN | HEART RATE: 74 BPM

## 2023-09-25 VITALS
DIASTOLIC BLOOD PRESSURE: 84 MMHG | OXYGEN SATURATION: 97 % | BODY MASS INDEX: 25.1 KG/M2 | HEART RATE: 71 BPM | WEIGHT: 147 LBS | TEMPERATURE: 97.3 F | SYSTOLIC BLOOD PRESSURE: 120 MMHG | HEIGHT: 64 IN

## 2023-09-25 DIAGNOSIS — Z00.00 ENCOUNTER FOR GENERAL ADULT MEDICAL EXAMINATION WITHOUT ABNORMAL FINDINGS: ICD-10-CM

## 2023-09-25 DIAGNOSIS — Z98.51 TUBAL LIGATION STATUS: Chronic | ICD-10-CM

## 2023-09-25 DIAGNOSIS — Z90.89 ACQUIRED ABSENCE OF OTHER ORGANS: Chronic | ICD-10-CM

## 2023-09-25 DIAGNOSIS — J34.2 DEVIATED NASAL SEPTUM: Chronic | ICD-10-CM

## 2023-09-25 DIAGNOSIS — Z90.710 ACQUIRED ABSENCE OF BOTH CERVIX AND UTERUS: Chronic | ICD-10-CM

## 2023-09-25 DIAGNOSIS — Z80.3 FAMILY HISTORY OF MALIGNANT NEOPLASM OF BREAST: ICD-10-CM

## 2023-09-25 DIAGNOSIS — Z98.890 OTHER SPECIFIED POSTPROCEDURAL STATES: Chronic | ICD-10-CM

## 2023-09-25 PROCEDURE — 99212 OFFICE O/P EST SF 10 MIN: CPT

## 2023-09-25 PROCEDURE — 99204 OFFICE O/P NEW MOD 45 MIN: CPT

## 2023-09-25 PROCEDURE — 99202 OFFICE O/P NEW SF 15 MIN: CPT

## 2023-09-26 DIAGNOSIS — R16.0 HEPATOMEGALY, NOT ELSEWHERE CLASSIFIED: ICD-10-CM

## 2023-09-28 ENCOUNTER — APPOINTMENT (OUTPATIENT)
Dept: SURGERY | Facility: CLINIC | Age: 38
End: 2023-09-28
Payer: MEDICAID

## 2023-09-28 VITALS
BODY MASS INDEX: 25.1 KG/M2 | TEMPERATURE: 96.8 F | WEIGHT: 147 LBS | HEIGHT: 64 IN | HEART RATE: 78 BPM | OXYGEN SATURATION: 99 % | SYSTOLIC BLOOD PRESSURE: 120 MMHG | DIASTOLIC BLOOD PRESSURE: 88 MMHG

## 2023-09-28 PROCEDURE — 99205 OFFICE O/P NEW HI 60 MIN: CPT

## 2023-09-28 PROCEDURE — 99417 PROLNG OP E/M EACH 15 MIN: CPT

## 2023-09-29 ENCOUNTER — APPOINTMENT (OUTPATIENT)
Dept: GASTROENTEROLOGY | Facility: CLINIC | Age: 38
End: 2023-09-29

## 2023-10-09 ENCOUNTER — NON-APPOINTMENT (OUTPATIENT)
Age: 38
End: 2023-10-09

## 2023-10-10 ENCOUNTER — APPOINTMENT (OUTPATIENT)
Dept: SURGERY | Facility: CLINIC | Age: 38
End: 2023-10-10

## 2023-10-17 ENCOUNTER — NON-APPOINTMENT (OUTPATIENT)
Age: 38
End: 2023-10-17

## 2023-10-30 ENCOUNTER — OUTPATIENT (OUTPATIENT)
Dept: OUTPATIENT SERVICES | Facility: HOSPITAL | Age: 38
LOS: 1 days | End: 2023-10-30

## 2023-10-30 DIAGNOSIS — J34.2 DEVIATED NASAL SEPTUM: Chronic | ICD-10-CM

## 2023-10-30 DIAGNOSIS — Z98.51 TUBAL LIGATION STATUS: Chronic | ICD-10-CM

## 2023-10-30 DIAGNOSIS — Z98.890 OTHER SPECIFIED POSTPROCEDURAL STATES: Chronic | ICD-10-CM

## 2023-10-30 DIAGNOSIS — Z90.710 ACQUIRED ABSENCE OF BOTH CERVIX AND UTERUS: Chronic | ICD-10-CM

## 2023-10-30 DIAGNOSIS — Z90.89 ACQUIRED ABSENCE OF OTHER ORGANS: Chronic | ICD-10-CM

## 2023-10-30 DIAGNOSIS — R16.0 HEPATOMEGALY, NOT ELSEWHERE CLASSIFIED: ICD-10-CM

## 2023-10-30 LAB
ALBUMIN SERPL ELPH-MCNC: 4.7 G/DL
ALP BLD-CCNC: 63 U/L
ALT SERPL-CCNC: 18 U/L
ANION GAP SERPL CALC-SCNC: 9 MMOL/L
AST SERPL-CCNC: 19 U/L
BILIRUB SERPL-MCNC: 0.3 MG/DL
BUN SERPL-MCNC: 7 MG/DL
CALCIUM SERPL-MCNC: 9.5 MG/DL
CHLORIDE SERPL-SCNC: 104 MMOL/L
CO2 SERPL-SCNC: 27 MMOL/L
CREAT SERPL-MCNC: 0.7 MG/DL
EGFR: 115 ML/MIN/1.73M2
GLUCOSE SERPL-MCNC: 88 MG/DL
POTASSIUM SERPL-SCNC: 4.3 MMOL/L
PROT SERPL-MCNC: 7.3 G/DL
SODIUM SERPL-SCNC: 140 MMOL/L

## 2023-10-31 DIAGNOSIS — R16.0 HEPATOMEGALY, NOT ELSEWHERE CLASSIFIED: ICD-10-CM

## 2023-11-01 ENCOUNTER — RESULT REVIEW (OUTPATIENT)
Age: 38
End: 2023-11-01

## 2023-11-01 ENCOUNTER — OUTPATIENT (OUTPATIENT)
Dept: OUTPATIENT SERVICES | Facility: HOSPITAL | Age: 38
LOS: 1 days | End: 2023-11-01
Payer: MEDICAID

## 2023-11-01 DIAGNOSIS — Z00.8 ENCOUNTER FOR OTHER GENERAL EXAMINATION: ICD-10-CM

## 2023-11-01 DIAGNOSIS — R16.0 HEPATOMEGALY, NOT ELSEWHERE CLASSIFIED: ICD-10-CM

## 2023-11-01 DIAGNOSIS — Z98.51 TUBAL LIGATION STATUS: Chronic | ICD-10-CM

## 2023-11-01 DIAGNOSIS — Z90.710 ACQUIRED ABSENCE OF BOTH CERVIX AND UTERUS: Chronic | ICD-10-CM

## 2023-11-01 DIAGNOSIS — Z98.890 OTHER SPECIFIED POSTPROCEDURAL STATES: Chronic | ICD-10-CM

## 2023-11-01 DIAGNOSIS — Z90.89 ACQUIRED ABSENCE OF OTHER ORGANS: Chronic | ICD-10-CM

## 2023-11-01 DIAGNOSIS — J34.2 DEVIATED NASAL SEPTUM: Chronic | ICD-10-CM

## 2023-11-01 PROCEDURE — 74183 MRI ABD W/O CNTR FLWD CNTR: CPT | Mod: 26

## 2023-11-01 PROCEDURE — A9581: CPT

## 2023-11-01 PROCEDURE — 74183 MRI ABD W/O CNTR FLWD CNTR: CPT

## 2023-11-02 DIAGNOSIS — R16.0 HEPATOMEGALY, NOT ELSEWHERE CLASSIFIED: ICD-10-CM

## 2023-11-07 ENCOUNTER — APPOINTMENT (OUTPATIENT)
Dept: SURGERY | Facility: CLINIC | Age: 38
End: 2023-11-07
Payer: MEDICAID

## 2023-11-07 VITALS
TEMPERATURE: 98 F | HEIGHT: 64 IN | BODY MASS INDEX: 24.41 KG/M2 | DIASTOLIC BLOOD PRESSURE: 70 MMHG | OXYGEN SATURATION: 98 % | WEIGHT: 143 LBS | HEART RATE: 97 BPM | SYSTOLIC BLOOD PRESSURE: 122 MMHG

## 2023-11-07 PROCEDURE — 99214 OFFICE O/P EST MOD 30 MIN: CPT

## 2023-12-07 NOTE — ED ADULT NURSE NOTE - NS_NURSE_DISC_TEACHING_YN_ED_ALL_ED
BP is 147/83 in office today, this is elevated but outlier for patient.  Patient reports some circumstantial stressors she feels are contributing to this.  Goal BP is 130/80 or less, ideally 120/80 or less.   I have asked that patient monitor BP at home and keep log.  If running above 130/80 can try supplements/recs below.  If continues to be elevated with those measures patient to reach out for next steps.    Patient is encouraged to continue low sodium diet (Dietary Approach to Stop Hypertension, or DASH diet) .   Exercise most days of the week.   Limit refined carbohydrates such as pretzels, cereals, breads, pastries, alcohol.    If patient wishes to try a natural approach to lowering blood pressure, can consider:  -whey protein 20 -30 g daily (hydrolyzed is best, but any is ok)  -aged garlic 600 mg twice daily ([employer] brand aged garlic on line is one example)   -CoQ10 supplement at 120 mg - 360 mg daily (average dose studied 225mg daily).     Patient to check BP regularly at home and keep a diary.  This should be taken after sitting with feet flat on floor and resting for 5 minutes.   Arm should be level with your heart.   New guidelines recommend goal for blood pressure less than 130/80.   Ideally for stroke and heart attack risk reduction the systolic, or top, blood pressure number should be in the 110's or 120's.    PLEASE BRING BP CUFF IN TO NEXT VISIT FOR VALIDATION - TAKE YOUR BP @ HOME BEFORE YOU COME!    Yes

## 2023-12-10 NOTE — ED ADULT TRIAGE NOTE - ESI TRIAGE ACUITY LEVEL, MLM
[place]    EMERGENCY DEPARTMENT ENCOUNTER      Pt Name: Tracey Merrill  MRN: [id number]  YOB: 1997  Date of evaluation: 12/10/2023  Provider: Burton Brasher MD    CHIEF COMPLAINT       Chief Complaint   Patient presents with    Abdominal Pain         HISTORY OF PRESENT ILLNESS   Tracey Merrill is a 26 y.o. female who presents to the emergency department with complaint of R lower abdominal pain over the past month that seems to radiate around from the R lower back. She has no radicular pain. Recently tx for uti with keflex but discomfort has continued. No vaginal discharge or recent new sexual partners. No vomiting, diarrhea, fever, chills. Patient denies any history of trauma, unexplained weight loss, neurologic deficits including bowel or bladder dysfunction/lower extremity weakness/lower extremity numbness/saddle anesthesia, fever, history of IV drug use/alcohol abuse/HIV/use of immunosuppressive medications/diabetes mellitus, chronic steroid use, or history of cancer.         Nursing notes were reviewed.    REVIEW OF SYSTEMS     ROS:  A chief complaint appropriate review of systems was completed and is negative except as noted in the HPI.      PAST MEDICAL HISTORY     Past Medical History:   Diagnosis Date    Depression     Lung nodule [id number]         SURGICAL HISTORY     No past surgical history on file.      CURRENT MEDICATIONS     No current facility-administered medications for this encounter.  No current outpatient medications on file.    ALLERGIES     Latex and Midodrine hcl    FAMILY HISTORY       Family History   Problem Relation Age of Onset    Asthma Maternal Grandmother     Heart disease Maternal Grandmother     Hypertension Maternal Grandfather     Aneurysm Paternal Grandmother     Aneurysm Paternal Grandfather           SOCIAL HISTORY       Social History     Socioeconomic History    Marital status:    Tobacco Use    Smoking status: Some Days     Packs/day: 1.00  "    Years: 3.00     Additional pack years: 0.00     Total pack years: 3.00     Types: Cigarettes     Start date: 1/1/2019    Smokeless tobacco: Never    Tobacco comments:     Quit smoking Jan 2022; Started back in Jan 2023   Substance and Sexual Activity    Alcohol use: No    Drug use: No    Sexual activity: Yes     Partners: Male         PHYSICAL EXAM    (up to 7 for level 4, 8 or more for level 5)     Vitals:    12/10/23 1509 12/10/23 1510   BP:  134/94   BP Location:  Right arm   Patient Position:  Sitting   Pulse:  99   Resp:  22   Temp:  97.8 °F (36.6 °C)   TempSrc:  Oral   SpO2:  100%   Weight: (!) 142 kg (312 lb)    Height: 175.3 cm (69\")        General: Awake, alert, no acute distress.  HEENT: Conjunctiva normal.  Neck: Trachea midline.  Cardiac: Heart regular rate, rhythm, no murmurs, rubs, or gallops  Lungs: Lungs are clear to auscultation, there is no wheezing, rhonchi, or rales. There is no use of accessory muscles.  Chest wall: There is no tenderness to palpation over the chest wall or over ribs  Abdomen: Abdomen is soft, nontender, nondistended. There is no firm or pulsatile masses, no rebound rigidity or guarding.   Musculoskeletal: Moderate R lower back tenderness. There is no midline tenderness. There is no asymmetry of BLE. DP/PT pulses are 2+ bilaterally.  Neuro: Motor and sensory function intact in BLE. There is no saddle anesthesia. Patellar/achilles reflexes are 1+ bilaterally.  Dermatology: Skin is warm and dry  Psych: Mentation is grossly normal, cognition is grossly normal. Affect is appropriate.       DIAGNOSTIC RESULTS     EKG: All EKGs are interpreted by the Emergency Department Physician who either signs or Co-signs this chart in the absence of a cardiologist.    No orders to display         RADIOLOGY:   [x] Radiologist's Report Reviewed:  CT Abdomen Pelvis With Contrast   Final Result   Impression:      No evidence of acute abnormality within the abdomen or pelvis.      Known right " 3 lower lobe pulmonary nodule that is being followed by chest CTs is unchanged dating back to 2/28/2022. Recommend a 3-month follow-up chest CT, as that would establish 2-year stability.         Electronically Signed: Jamaal Bullock MD     12/10/2023 4:44 PM EST     Workstation ID: [id number]          I ordered and independently reviewed the above noted radiographic studies.        LABS:    I have reviewed and interpreted all of the currently available lab results from this visit (if applicable):  Results for orders placed or performed during the hospital encounter of 12/10/23   Comprehensive Metabolic Panel    Specimen: Blood   Result Value Ref Range    Glucose 88 65 - 99 mg/dL    BUN 11 6 - 20 mg/dL    Creatinine 0.61 0.57 - 1.00 mg/dL    Sodium 137 136 - 145 mmol/L    Potassium 4.2 3.5 - 5.2 mmol/L    Chloride 104 98 - 107 mmol/L    CO2 23.0 22.0 - 29.0 mmol/L    Calcium 9.2 8.6 - 10.5 mg/dL    Total Protein 7.4 6.0 - 8.5 g/dL    Albumin 4.1 3.5 - 5.2 g/dL    ALT (SGPT) 19 1 - 33 U/L    AST (SGOT) 19 1 - 32 U/L    Alkaline Phosphatase 89 39 - 117 U/L    Total Bilirubin 0.3 0.0 - 1.2 mg/dL    Globulin 3.3 gm/dL    A/G Ratio 1.2 g/dL    BUN/Creatinine Ratio 18.0 7.0 - 25.0    Anion Gap 10.0 5.0 - 15.0 mmol/L    eGFR 127.4 >60.0 mL/min/1.73   Lipase    Specimen: Blood   Result Value Ref Range    Lipase 22 13 - 60 U/L   Urinalysis With Microscopic If Indicated (No Culture) - Urine, Clean Catch    Specimen: Urine, Clean Catch   Result Value Ref Range    Color, UA Yellow Yellow, Straw    Appearance, UA Clear Clear    pH, UA 7.0 5.0 - 8.0    Specific Gravity, UA 1.013 1.001 - 1.030    Glucose, UA Negative Negative    Ketones, UA Negative Negative    Bilirubin, UA Negative Negative    Blood, UA Negative Negative    Protein, UA Negative Negative    Leuk Esterase, UA Negative Negative    Nitrite, UA Negative Negative    Urobilinogen, UA 0.2 E.U./dL 0.2 - 1.0 E.U./dL   CBC Auto Differential    Specimen: Blood   Result Value Ref  Range    WBC 11.61 (H) 3.40 - 10.80 10*3/mm3    RBC 4.67 3.77 - 5.28 10*6/mm3    Hemoglobin 13.4 12.0 - 15.9 g/dL    Hematocrit 40.0 34.0 - 46.6 %    MCV 85.7 79.0 - 97.0 fL    MCH 28.7 26.6 - 33.0 pg    MCHC 33.5 31.5 - 35.7 g/dL    RDW 12.6 12.3 - 15.4 %    RDW-SD 38.9 37.0 - 54.0 fl    MPV 9.8 6.0 - 12.0 fL    Platelets 332 140 - 450 10*3/mm3    Neutrophil % 61.9 42.7 - 76.0 %    Lymphocyte % 30.7 19.6 - 45.3 %    Monocyte % 4.0 (L) 5.0 - 12.0 %    Eosinophil % 2.3 0.3 - 6.2 %    Basophil % 0.7 0.0 - 1.5 %    Immature Grans % 0.4 0.0 - 0.5 %    Neutrophils, Absolute 7.18 (H) 1.70 - 7.00 10*3/mm3    Lymphocytes, Absolute 3.56 (H) 0.70 - 3.10 10*3/mm3    Monocytes, Absolute 0.47 0.10 - 0.90 10*3/mm3    Eosinophils, Absolute 0.27 0.00 - 0.40 10*3/mm3    Basophils, Absolute 0.08 0.00 - 0.20 10*3/mm3    Immature Grans, Absolute 0.05 0.00 - 0.05 10*3/mm3    nRBC 0.0 0.0 - 0.2 /100 WBC   POC Urine Pregnancy    Specimen: Urine   Result Value Ref Range    HCG, Urine, QL Negative Negative    Lot Number [id number]     Internal Positive Control Positive Positive, Passed    Internal Negative Control Negative Negative, Passed    Expiration Date 02/04/2025         If labs were ordered, I independently reviewed the results and considered them in treating the patient.      EMERGENCY DEPARTMENT COURSE and DIFFERENTIAL DIAGNOSIS/MDM:   Vitals:  AS OF 21:15 EST    BP - 134/94  HR - 99  TEMP - 97.8 °F (36.6 °C) (Oral)  O2 SATS - 100%        Discussion below represents my analysis of pertinent findings related to patient's condition, differential diagnosis, treatment plan and final disposition.      Differential diagnosis:  The differential diagnosis associated with the patient's presentation includes: lumbar strain, sciatica, uti, urinary stone      Independent interpretations (ECG/rhythm strip/X-ray/US/CT scan): I independently interpreted the pt abd CT and cardiac monitor - there is no obstructive change and the pt is in  NSR      Patient's care impacted by:   [] Diabetes   [] Hypertension   [] Coronary Artery Disease   [] Cancer   [x] Other: morbid obesity    Care significantly affected by Social Determinants of Health (housing and economic circumstances, unemployment)    [] Yes     [x] No   If yes, Patient's care significantly limited by  Social Determinants of Health including:    [] Inadequate housing    [] Low income    [] Alcoholism and drug addiction in family    [] Problems related to primary support group    [] Unemployment    [] Problems related to employment    [] Other Social Determinants of Health:       I had a discussion with the patient/family regarding diagnosis, diagnostic results, treatment plan, and medications.  The patient/family indicated understanding of these instructions.  I spent adequate time at the bedside preceding discharge necessary to personally discuss the aftercare instructions, giving patient education, providing explanations of the results of our evaluations/findings, and my decision making to assure that the patient/family understand the plan of care.  Time was allotted to answer questions at that time and throughout the ED course.  Emphasis was placed on timely follow-up after discharge.  I also discussed the potential for the development of an acute emergent condition requiring further evaluation, admission, or even surgical intervention. I discussed that we found nothing during the visit today indicating the need for further workup, admission, or the presence of an unstable medical condition.  I encouraged the patient to return to the emergency department immediately for ANY concerns, worsening, new complaints, or if symptoms persist and unable to seek follow-up in a timely fashion.  The patient/family expressed understanding and agreement with this plan.  The patient will follow-up with their PCP in 1-2 days for reevaluation.           PROCEDURES:  Procedures    CRITICAL CARE TIME         FINAL IMPRESSION      1. Acute generalized abdominal pain    2. Acute right-sided low back pain without sciatica          DISPOSITION/PLAN     ED Disposition       ED Disposition   Discharge    Condition   Stable    Comment   --                 Comment: Please note this report has been produced using speech recognition software.      Burton Brasher MD  Attending Emergency Physician             Burton Brasher MD  12/12/23 8422

## 2024-01-15 NOTE — DISCHARGE NOTE PROVIDER - NSDCQMSTAIRS_GEN_ALL_CORE
Patient was seen in ER 1/9/2024, there is a consult from you and he was d/c. Dr. Camargo's office called stating he is wanting him to follow up with you, he has his yearly follow up scheduled in October 2024. Please advise where to add. Thank you.   
No

## 2024-02-09 NOTE — ED PROVIDER NOTE - ATTENDING CONTRIBUTION TO CARE
abdominal pain 33 y/o female h/o PCOS, menorrhagia on progesterone, migraine h/a, s/p BTL, endometrial ablation and BTL now presents with vaginal bleeding x yesterday, sx are persistent, reports 3 pads over the past day w/o clots, denies modifying factors, associated RLQ pain, persistent, denies modifying factors, last bowel movement was days ago, passing stool and flatus, denies fever, n/v/d, anorexia, cough, respiratory sx, change in bowel habits or urinary sx, vaginal d/c or other associated complaints at present.     Old chart reviewed.  I have reviewed and agree with the nursing note, except as documented in my note.    VSS, awake, alert, non-toxic appearing, lying comfortably in stretcher, in NAD, no scleral icterus, oropharynx clear, mmm, no jaundice, skin rash or lesions, chest CTAB, non-labored breathing, no w/r/r, +S1/S2, RRR, no m/r/g, abdomen soft, NT, ND, +BS, no hernias or distention, no pulsatile masses or bruits appreciated, no CVA tenderness, no peripheral edema or deformities, alert, clear speech and steady gait.    IMP: Labs, UA, US WNL, pt with chronic RLQ pain, declined CT for this sx, no signs sx anemia at this time, rec outpt f/u with GYN. The patient was given detailed return precautions and advised to return to the emergency department in 2-3 days if not improving or sooner if any new symptoms developed, symptoms worsened or for any concerns. The patient was offered the opportunity to ask questions and verbalized that they understand the diagnosis and discharge instructions.        presents to the ED c/o "I was having heavy periods and my GYN put me on Norethidione. I haven't had my period in 3 months. Yesterday I was at work and felt a pop on the right side and I had some pinkish fluid with nausea." no vomit/ fever/ chills/ diarrhea

## 2024-02-28 ENCOUNTER — NON-APPOINTMENT (OUTPATIENT)
Age: 39
End: 2024-02-28

## 2024-03-13 ENCOUNTER — NON-APPOINTMENT (OUTPATIENT)
Age: 39
End: 2024-03-13

## 2024-03-25 NOTE — ED PROVIDER NOTE - NS ED ATTENDING STATEMENT MOD
Goal Outcome Evaluation:    Admitted from ED last evening 2230.   at bedside.  Admitted:  nausea, vomiting, constipation, intractable back pain and shortness of breath.  Found to have acute pulmonary embolism, several nonocclusive subsegmental pulmonary emboli     AVSS.  HTN, but within parameters.  98% RA.  Cont cardiac monitoring - NSR.  LS diminished.  ROMAN.   Up with SBA - pt with weakness and fatigue.  Heparin gtt started at 0030 at 700u/hr - next Xa lab at 0630.  Pt refused another PIV placed, refused IV fluids - MD aware.  Pt refused 2 RN skin check.  Pt very anxious at start of shift.  Got PRN Klonopin.  Abd pain - PRN oxycodone and tylenol with relief.  Zofran x1 for nausea.  Regular diet ordered, pt has poor PO past 3 weeks  Palliative and Oncology consults.  Cont with POC.     Attending with

## 2024-04-16 LAB
ALBUMIN SERPL ELPH-MCNC: 4.6 G/DL
ALP BLD-CCNC: 56 U/L
ALT SERPL-CCNC: 17 U/L
ANION GAP SERPL CALC-SCNC: 10 MMOL/L
AST SERPL-CCNC: 18 U/L
BILIRUB SERPL-MCNC: 0.6 MG/DL
BUN SERPL-MCNC: 7 MG/DL
CALCIUM SERPL-MCNC: 9.8 MG/DL
CHLORIDE SERPL-SCNC: 104 MMOL/L
CO2 SERPL-SCNC: 25 MMOL/L
CREAT SERPL-MCNC: 0.7 MG/DL
EGFR: 114 ML/MIN/1.73M2
GLUCOSE SERPL-MCNC: 99 MG/DL
POTASSIUM SERPL-SCNC: 5 MMOL/L
PROT SERPL-MCNC: 7.1 G/DL
SODIUM SERPL-SCNC: 139 MMOL/L

## 2024-04-18 ENCOUNTER — OUTPATIENT (OUTPATIENT)
Dept: OUTPATIENT SERVICES | Facility: HOSPITAL | Age: 39
LOS: 1 days | End: 2024-04-18
Payer: MEDICAID

## 2024-04-18 ENCOUNTER — RESULT REVIEW (OUTPATIENT)
Age: 39
End: 2024-04-18

## 2024-04-18 DIAGNOSIS — Z00.8 ENCOUNTER FOR OTHER GENERAL EXAMINATION: ICD-10-CM

## 2024-04-18 DIAGNOSIS — Z98.51 TUBAL LIGATION STATUS: Chronic | ICD-10-CM

## 2024-04-18 DIAGNOSIS — Z98.890 OTHER SPECIFIED POSTPROCEDURAL STATES: Chronic | ICD-10-CM

## 2024-04-18 DIAGNOSIS — J34.2 DEVIATED NASAL SEPTUM: Chronic | ICD-10-CM

## 2024-04-18 DIAGNOSIS — Z90.710 ACQUIRED ABSENCE OF BOTH CERVIX AND UTERUS: Chronic | ICD-10-CM

## 2024-04-18 DIAGNOSIS — R16.0 HEPATOMEGALY, NOT ELSEWHERE CLASSIFIED: ICD-10-CM

## 2024-04-18 DIAGNOSIS — Z90.89 ACQUIRED ABSENCE OF OTHER ORGANS: Chronic | ICD-10-CM

## 2024-04-18 PROCEDURE — A9581: CPT

## 2024-04-18 PROCEDURE — 74183 MRI ABD W/O CNTR FLWD CNTR: CPT | Mod: 26

## 2024-04-18 PROCEDURE — 74183 MRI ABD W/O CNTR FLWD CNTR: CPT

## 2024-04-19 DIAGNOSIS — R16.0 HEPATOMEGALY, NOT ELSEWHERE CLASSIFIED: ICD-10-CM

## 2024-04-23 ENCOUNTER — APPOINTMENT (OUTPATIENT)
Dept: SURGERY | Facility: CLINIC | Age: 39
End: 2024-04-23
Payer: MEDICAID

## 2024-04-23 VITALS
HEIGHT: 64 IN | WEIGHT: 134.13 LBS | SYSTOLIC BLOOD PRESSURE: 98 MMHG | TEMPERATURE: 97.2 F | DIASTOLIC BLOOD PRESSURE: 62 MMHG | BODY MASS INDEX: 22.9 KG/M2

## 2024-04-23 PROCEDURE — 99215 OFFICE O/P EST HI 40 MIN: CPT

## 2024-04-30 NOTE — HISTORY OF PRESENT ILLNESS
[de-identified] : SIMONE HOLDEN  is a pleasant 37 year year old woman  who came in 09/28/2023 for mulitple liver lesions. She has Dr ANA ENRIQUEZ as Her PCP.  Gastrointestinal HPI: 36 yo f with history of PCOS , s/p hysterectomy 5/2023 referred by primary GI for evaluation of hepatic lesions. CT revealed hepatic lesions during ER visit for abdominal pain. Now MRI performed, multiple hepatic lesions noted as well in both hepatic lobes: largest 3.5 cm, suspicious for focal nodular hyperplasia or hepatic adenoma.  patient recently had egd and colonoscopy for work up of abdominal pain. colon revealing ext hemorrhoids and rectal hyperplastic polyp. egd unrevealing of acute pathology. The patient denies nausea, vomiting, dysphagia, odynophagia, post prandial abdominal pain, or melena.    mother: ESRD/HD, she had kidney cancer,  sister: adrenal gland tumor, cervical cancer and thyroid ca, fibrolamellar ca grandma sister breast cancer  mother of 2 children  gained 15 pounds over last couple months  hysterectomy may 2022 for bleeding

## 2024-04-30 NOTE — ASSESSMENT
[FreeTextEntry1] : Liver masses (573.8) (R16.0) SIMONE HOLDEN is a pleasant 37 year year old woman who came in 09/28/2023 for mulitple liver lesions. She has Dr ANA ENRIQUEZ as Her PCP.  Gastrointestinal HPI: 38 yo f with history of PCOS , s/p hysterectomy 5/2023 referred by primary GI for evaluation of hepatic lesions. CT revealed hepatic lesions during ER visit for abdominal pain. Now MRI performed, multiple hepatic lesions noted as well in both hepatic lobes: largest 3.5 cm, suspicious for focal nodular hyperplasia or hepatic adenoma.  patient recently had egd and colonoscopy for work up of abdominal pain. colon revealing ext hemorrhoids and rectal hyperplastic polyp. egd unrevealing of acute pathology. The patient denies nausea, vomiting, dysphagia, odynophagia, post prandial abdominal pain, or melena.   mother: ESRD/HD, she had kidney cancer, sister: adrenal gland tumor, cervical cancer and thyroid ca, fibrolamellar ca grandma sister breast cancer  9/28/2023: discussed need to repeat imaging and discuss at Gi tumor board, patient was discussed after her visit and consensus was to repeat MRI with eovist with the impression by our body imager as FNH, will see her after MRI, possible multiple adenoma and her imaging not mathcing fibrolamellar ca but we will make final decision about biopsy or not based on her coming rmi with eovist  11/7/2023: MRI liver showed features consistent with FNH, will repeat MRI feb 2024, requested to bring her sister slides of path to review , offered bx of her FNH but she would rather hold for bx,   4/23/2024:reported some weight loss (9 pounds over last 6 months), MRI april 2024->Since November 1, 2023, multiple unchanged arterially enhancing hepatic lesions compatible with focal nodular hyperplasia (FNH). given her sister Fibrolamellar ca and her recent weight loss will send for liver bx.  the above plan of care with discussed in details to the patient and all questions were answered to patient satisfaction. patient instructed to follow up with the referring physician and patient primary care provider  A total of 45 minutes was spent on this visit, obtaining h/p, reviewing previous notes/imaging, counseling the patient on f/u , ordering tests (below), discussing her at GI tumor board and documenting the findings in the note.

## 2024-05-08 ENCOUNTER — OUTPATIENT (OUTPATIENT)
Dept: OUTPATIENT SERVICES | Facility: HOSPITAL | Age: 39
LOS: 1 days | End: 2024-05-08
Payer: MEDICAID

## 2024-05-08 VITALS
DIASTOLIC BLOOD PRESSURE: 65 MMHG | TEMPERATURE: 98 F | OXYGEN SATURATION: 100 % | SYSTOLIC BLOOD PRESSURE: 120 MMHG | RESPIRATION RATE: 18 BRPM | HEART RATE: 71 BPM | WEIGHT: 136.91 LBS | HEIGHT: 65 IN

## 2024-05-08 DIAGNOSIS — R16.0 HEPATOMEGALY, NOT ELSEWHERE CLASSIFIED: ICD-10-CM

## 2024-05-08 DIAGNOSIS — Z90.710 ACQUIRED ABSENCE OF BOTH CERVIX AND UTERUS: Chronic | ICD-10-CM

## 2024-05-08 DIAGNOSIS — Z98.890 OTHER SPECIFIED POSTPROCEDURAL STATES: Chronic | ICD-10-CM

## 2024-05-08 DIAGNOSIS — Z98.51 TUBAL LIGATION STATUS: Chronic | ICD-10-CM

## 2024-05-08 DIAGNOSIS — Z01.818 ENCOUNTER FOR OTHER PREPROCEDURAL EXAMINATION: ICD-10-CM

## 2024-05-08 DIAGNOSIS — J34.2 DEVIATED NASAL SEPTUM: Chronic | ICD-10-CM

## 2024-05-08 LAB
ANION GAP SERPL CALC-SCNC: 15 MMOL/L — HIGH (ref 7–14)
BASOPHILS # BLD AUTO: 0.07 K/UL — SIGNIFICANT CHANGE UP (ref 0–0.2)
BASOPHILS NFR BLD AUTO: 0.9 % — SIGNIFICANT CHANGE UP (ref 0–1)
BUN SERPL-MCNC: 10 MG/DL — SIGNIFICANT CHANGE UP (ref 10–20)
CALCIUM SERPL-MCNC: 9.7 MG/DL — SIGNIFICANT CHANGE UP (ref 8.4–10.5)
CHLORIDE SERPL-SCNC: 101 MMOL/L — SIGNIFICANT CHANGE UP (ref 98–110)
CO2 SERPL-SCNC: 25 MMOL/L — SIGNIFICANT CHANGE UP (ref 17–32)
CREAT SERPL-MCNC: 0.7 MG/DL — SIGNIFICANT CHANGE UP (ref 0.7–1.5)
EGFR: 114 ML/MIN/1.73M2 — SIGNIFICANT CHANGE UP
EOSINOPHIL # BLD AUTO: 0.21 K/UL — SIGNIFICANT CHANGE UP (ref 0–0.7)
EOSINOPHIL NFR BLD AUTO: 2.7 % — SIGNIFICANT CHANGE UP (ref 0–8)
GLUCOSE SERPL-MCNC: 86 MG/DL — SIGNIFICANT CHANGE UP (ref 70–99)
HCT VFR BLD CALC: 41.1 % — SIGNIFICANT CHANGE UP (ref 37–47)
HGB BLD-MCNC: 14 G/DL — SIGNIFICANT CHANGE UP (ref 12–16)
IMM GRANULOCYTES NFR BLD AUTO: 0.3 % — SIGNIFICANT CHANGE UP (ref 0.1–0.3)
LYMPHOCYTES # BLD AUTO: 2.3 K/UL — SIGNIFICANT CHANGE UP (ref 1.2–3.4)
LYMPHOCYTES # BLD AUTO: 29.8 % — SIGNIFICANT CHANGE UP (ref 20.5–51.1)
MCHC RBC-ENTMCNC: 31.4 PG — HIGH (ref 27–31)
MCHC RBC-ENTMCNC: 34.1 G/DL — SIGNIFICANT CHANGE UP (ref 32–37)
MCV RBC AUTO: 92.2 FL — SIGNIFICANT CHANGE UP (ref 81–99)
MONOCYTES # BLD AUTO: 0.53 K/UL — SIGNIFICANT CHANGE UP (ref 0.1–0.6)
MONOCYTES NFR BLD AUTO: 6.9 % — SIGNIFICANT CHANGE UP (ref 1.7–9.3)
NEUTROPHILS # BLD AUTO: 4.58 K/UL — SIGNIFICANT CHANGE UP (ref 1.4–6.5)
NEUTROPHILS NFR BLD AUTO: 59.4 % — SIGNIFICANT CHANGE UP (ref 42.2–75.2)
NRBC # BLD: 0 /100 WBCS — SIGNIFICANT CHANGE UP (ref 0–0)
PLATELET # BLD AUTO: 210 K/UL — SIGNIFICANT CHANGE UP (ref 130–400)
PMV BLD: 12.1 FL — HIGH (ref 7.4–10.4)
POTASSIUM SERPL-MCNC: 3.5 MMOL/L — SIGNIFICANT CHANGE UP (ref 3.5–5)
POTASSIUM SERPL-SCNC: 3.5 MMOL/L — SIGNIFICANT CHANGE UP (ref 3.5–5)
RBC # BLD: 4.46 M/UL — SIGNIFICANT CHANGE UP (ref 4.2–5.4)
RBC # FLD: 12.3 % — SIGNIFICANT CHANGE UP (ref 11.5–14.5)
SODIUM SERPL-SCNC: 141 MMOL/L — SIGNIFICANT CHANGE UP (ref 135–146)
WBC # BLD: 7.71 K/UL — SIGNIFICANT CHANGE UP (ref 4.8–10.8)
WBC # FLD AUTO: 7.71 K/UL — SIGNIFICANT CHANGE UP (ref 4.8–10.8)

## 2024-05-08 PROCEDURE — 80048 BASIC METABOLIC PNL TOTAL CA: CPT

## 2024-05-08 PROCEDURE — 93010 ELECTROCARDIOGRAM REPORT: CPT

## 2024-05-08 PROCEDURE — 99214 OFFICE O/P EST MOD 30 MIN: CPT | Mod: 25

## 2024-05-08 PROCEDURE — 36415 COLL VENOUS BLD VENIPUNCTURE: CPT

## 2024-05-08 PROCEDURE — 85025 COMPLETE CBC W/AUTO DIFF WBC: CPT

## 2024-05-08 PROCEDURE — 93005 ELECTROCARDIOGRAM TRACING: CPT

## 2024-05-08 NOTE — H&P PST ADULT - HISTORY OF PRESENT ILLNESS
Patient is a 38 year old  female presenting to PAST in preparation for Liver Mass Biopsy  on 5-  under local standby anesthesia by Dr. Keshawn Wells .    Patient states experiencing vague abdominal pain "for years".  States the pain is in the right lower quadrant and is dull and aching, 10/10 on the pain scale.  Denies radiating pain.  Takes ibuprofen for pain with minimal relief.    States that she went to ER a year and a half ago for abdominal pain and was diagnosed with Liver mass.  Reports weight loss of 13lb in 3 months, and experiencing changes in bowel habits such as constipation and diarrhea over the past year.  Denies nausea and vomiting.     PATIENT CURRENTLY DENIES CHEST PAIN  SHORTNESS OF BREATH  PALPITATIONS,  DYSURIA, OR UPPER RESPIRATORY INFECTION IN PAST 2 WEEKS  denies travel outside the [place] in the past 30 days      Anesthesia Alert  NO--Difficult Airway  NO--History of neck surgery or radiation  NO--Limited ROM of neck  NO--History of Malignant hyperthermia  NO--No personal or family history of Pseudocholinesterase deficiency.  NO--Prior Anesthesia Complication  NO--Latex Allergy  NO--Loose teeth  NO--History of Rheumatoid Arthritis  NO--Bleeding risk  NO--KATHIE  NO--Other_____    PT DENIES ANY RASHES, ABRASION, OR OPEN WOUNDS OR CUTS    AS PER THE PT, THIS IS HIS/HER COMPLETE MEDICAL AND SURGICAL HX, INCLUDING MEDICATIONS PRESCRIBED AND OVER THE COUNTER    Patient verbalized understanding of instructions and was given the opportunity to ask questions and have them answered.    pt denies any suicidal ideation or thoughts, pt states not a threat to self or others    Revised Cardiac Risk Index for Pre-Operative Risk from [email or web address]  on 5/8/2024  ** All calculations should be rechecked by clinician prior to use **    RESULT SUMMARY:  0 points  Class I Risk    3.9 %  30-day risk of death, MI, or cardiac arrest    From [employer] 2017. These numbers are higher than those from the original study (Ed 1999). See Evidence for details.      INPUTS:  Elevated-risk surgery —> 0 = No  History of ischemic heart disease —> 0 = No  History of congestive heart failure —> 0 = No  History of cerebrovascular disease —> 0 = No  Pre-operative treatment with insulin —> 0 = No  Pre-operative creatinine >2 mg/dL / 176.8 µmol/L —> 0 = No    [employer] Activity Status Index (DASI) from [employer]  on 5/8/2024  ** All calculations should be rechecked by clinician prior to use **    RESULT SUMMARY:  58.2 points  The higher the score (maximum 58.2), the higher the functional status.    9.89 METs        INPUTS:  Take care of self —> 2.75 = Yes  Walk indoors —> 1.75 = Yes  Walk 1&ndash;2 blocks on level ground —> 2.75 = Yes  Climb a flight of stairs or walk up a hill —> 5.5 = Yes  Run a short distance —> 8 = Yes  Do light work around the house —> 2.7 = Yes  Do moderate work around the house —> 3.5 = Yes  Do heavy work around the house —> 8 = Yes  Do yardwork —> 4.5 = Yes  Have sexual relations —> 5.25 = Yes  Participate in moderate recreational activities —> 6 = Yes  Participate in strenuous sports —> 7.5 = Yes

## 2024-05-08 NOTE — H&P PST ADULT - REASON FOR ADMISSION
Case Type: OP Non-block TimeSuite: Interventional RadiologyProceduralist: Keshawn Waddell Surgery DateTime: 05- - 0:00PAST DateTime: 05- - 0:00Procedure: Liver Mass Biopsy  ERP?: UnavailableLaterality: N/ALength of Procedure: 60 Minutes  Anesthesia Type: Local Standby

## 2024-05-08 NOTE — H&P PST ADULT - BP NONINVASIVE MEAN (MM HG)
----- Message from Pramod Lema [phone]  Additional Information:         83

## 2024-05-09 ENCOUNTER — NON-APPOINTMENT (OUTPATIENT)
Age: 39
End: 2024-05-09

## 2024-05-09 DIAGNOSIS — Z01.818 ENCOUNTER FOR OTHER PREPROCEDURAL EXAMINATION: ICD-10-CM

## 2024-05-09 DIAGNOSIS — R16.0 HEPATOMEGALY, NOT ELSEWHERE CLASSIFIED: ICD-10-CM

## 2024-05-10 ENCOUNTER — OUTPATIENT (OUTPATIENT)
Dept: OUTPATIENT SERVICES | Facility: HOSPITAL | Age: 39
LOS: 1 days | End: 2024-05-10
Payer: MEDICAID

## 2024-05-10 DIAGNOSIS — Z98.890 OTHER SPECIFIED POSTPROCEDURAL STATES: Chronic | ICD-10-CM

## 2024-05-10 DIAGNOSIS — Z02.9 ENCOUNTER FOR ADMINISTRATIVE EXAMINATIONS, UNSPECIFIED: ICD-10-CM

## 2024-05-10 DIAGNOSIS — Z98.51 TUBAL LIGATION STATUS: Chronic | ICD-10-CM

## 2024-05-10 DIAGNOSIS — Z90.89 ACQUIRED ABSENCE OF OTHER ORGANS: Chronic | ICD-10-CM

## 2024-05-10 DIAGNOSIS — J34.2 DEVIATED NASAL SEPTUM: Chronic | ICD-10-CM

## 2024-05-10 DIAGNOSIS — Z90.710 ACQUIRED ABSENCE OF BOTH CERVIX AND UTERUS: Chronic | ICD-10-CM

## 2024-05-10 LAB
APTT BLD: 31.9 SEC — SIGNIFICANT CHANGE UP (ref 27–39.2)
INR BLD: 1.03 RATIO — SIGNIFICANT CHANGE UP (ref 0.65–1.3)
PROTHROM AB SERPL-ACNC: 11.7 SEC — SIGNIFICANT CHANGE UP (ref 9.95–12.87)

## 2024-05-10 PROCEDURE — 85730 THROMBOPLASTIN TIME PARTIAL: CPT

## 2024-05-10 PROCEDURE — 85610 PROTHROMBIN TIME: CPT

## 2024-05-10 PROCEDURE — 36415 COLL VENOUS BLD VENIPUNCTURE: CPT

## 2024-05-11 DIAGNOSIS — Z02.9 ENCOUNTER FOR ADMINISTRATIVE EXAMINATIONS, UNSPECIFIED: ICD-10-CM

## 2024-05-16 ENCOUNTER — OUTPATIENT (OUTPATIENT)
Dept: OUTPATIENT SERVICES | Facility: HOSPITAL | Age: 39
LOS: 1 days | Discharge: HOME | End: 2024-05-16
Payer: MEDICAID

## 2024-05-16 ENCOUNTER — TRANSCRIPTION ENCOUNTER (OUTPATIENT)
Age: 39
End: 2024-05-16

## 2024-05-16 ENCOUNTER — RESULT REVIEW (OUTPATIENT)
Age: 39
End: 2024-05-16

## 2024-05-16 VITALS
DIASTOLIC BLOOD PRESSURE: 60 MMHG | SYSTOLIC BLOOD PRESSURE: 106 MMHG | HEART RATE: 62 BPM | OXYGEN SATURATION: 98 % | RESPIRATION RATE: 16 BRPM

## 2024-05-16 VITALS
RESPIRATION RATE: 20 BRPM | DIASTOLIC BLOOD PRESSURE: 69 MMHG | WEIGHT: 132.06 LBS | HEART RATE: 60 BPM | HEIGHT: 65 IN | SYSTOLIC BLOOD PRESSURE: 117 MMHG | TEMPERATURE: 98 F | OXYGEN SATURATION: 98 %

## 2024-05-16 DIAGNOSIS — Z90.710 ACQUIRED ABSENCE OF BOTH CERVIX AND UTERUS: Chronic | ICD-10-CM

## 2024-05-16 DIAGNOSIS — Z90.89 ACQUIRED ABSENCE OF OTHER ORGANS: Chronic | ICD-10-CM

## 2024-05-16 DIAGNOSIS — J34.2 DEVIATED NASAL SEPTUM: Chronic | ICD-10-CM

## 2024-05-16 DIAGNOSIS — Z98.890 OTHER SPECIFIED POSTPROCEDURAL STATES: Chronic | ICD-10-CM

## 2024-05-16 DIAGNOSIS — Z98.51 TUBAL LIGATION STATUS: Chronic | ICD-10-CM

## 2024-05-16 DIAGNOSIS — R16.0 HEPATOMEGALY, NOT ELSEWHERE CLASSIFIED: ICD-10-CM

## 2024-05-16 PROCEDURE — 77012 CT SCAN FOR NEEDLE BIOPSY: CPT | Mod: 26

## 2024-05-16 PROCEDURE — 88173 CYTOPATH EVAL FNA REPORT: CPT | Mod: 26

## 2024-05-16 PROCEDURE — 88341 IMHCHEM/IMCYTCHM EA ADD ANTB: CPT | Mod: 26

## 2024-05-16 PROCEDURE — 88313 SPECIAL STAINS GROUP 2: CPT | Mod: 26

## 2024-05-16 PROCEDURE — 77012 CT SCAN FOR NEEDLE BIOPSY: CPT

## 2024-05-16 PROCEDURE — 88342 IMHCHEM/IMCYTCHM 1ST ANTB: CPT

## 2024-05-16 PROCEDURE — 47000 NEEDLE BIOPSY OF LIVER PERQ: CPT

## 2024-05-16 PROCEDURE — 88342 IMHCHEM/IMCYTCHM 1ST ANTB: CPT | Mod: 26

## 2024-05-16 PROCEDURE — 88341 IMHCHEM/IMCYTCHM EA ADD ANTB: CPT

## 2024-05-16 PROCEDURE — 88313 SPECIAL STAINS GROUP 2: CPT

## 2024-05-16 PROCEDURE — 88173 CYTOPATH EVAL FNA REPORT: CPT

## 2024-05-16 NOTE — PRE PROCEDURE NOTE - PRIOR H&P DATE
08-May-2024 Physical Therapy Visit    Visit Type: Daily Treatment Note  Visit: 8  Referring Provider: Thierno Cordova MD  Medical Diagnosis (from order): Diagnosis Information    Diagnosis  840.4 (ICD-9-CM) - S46.011A (ICD-10-CM) - Traumatic complete tear of right rotator cuff, initial encounter         SUBJECTIVE                                                                                                               States that today is the last day of short term disability. Patient intends to go back to work tomorrow.     States continuing to over do it both with exercises, but more so every day motions like reaching for something behind back, reaching for remote, opening freeze.     States that he did fall at beach yesterday when walking on mud, but fell on left shoulder and right knee.     States that he also opened a jar of pickles that irritated shoulder. States not over lifting anything.    States that he is waking up on right shoulder which could also increase pain. Right shoulder is always swollen in AM.     States also overdoing overhead stretching with cane and left upper extremity. States he is pushing through pain and feeling cracking and popping.     States only using Aleve.     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    Job Related Testing  - Job demands provided by: Client Report  Lift Floor to Waist     - Patient ability: MET. can use left arm as needed.      - Job demand: 10lbs with either hand; computer bag  Lift Waist to Chest      - Patient ability: MET. able to do now at home      - Job demand: book/piece of paper  Lift Waist to Overhead      - Job demands: N/A  2 Hand Carry     - Patient ability: MET.     - Job demands: book or computer bag  Push/Pull     - Patient ability:  MET; can open own door     - Job demands: door   Sustained Overhead Work     - Patient ability: MET; can use left arm if needing to reach for something. small objects      - Job demands: occasionally (up to 1/3 of the day)  Sustained Sitting     - Patient ability: MET.    - Job demands: constantly (2/3-the full day)  Sustained Standing     - Job demands: never  Sustained Kneeling    - Job demand: never  typing for 8 hours per day      - Patient ability: MET; can do at home or office  drive      - Patient ability: MET; can drive with both arms     OBJECTIVE                                                                                                                     Range of Motion (ROM)   (degrees unless noted; active unless noted; norms in ( ); negative=lacking to 0, positive=beyond 0)  Shoulder:    - Flexion (180):        • Right: 82    - Abduction (180):        • Right: 67                       Treatment     Therapeutic Exercise  - Seated Single Arm Shoulder Flexion  - 5 reps - 4x  - Seated Shoulder Flexion Towel Slide at Table Top  - 10 reps   - Standing Single Arm Shoulder Flexion Towel Slide at Table Top  - 10 reps   - Forward Pulley   - 10 reps - 2x  - Supine Shoulder Flexion with Dowel to 90  - 8 reps - 2x  - Seated Shoulder External Rotation AAROM with Dowel  - 5 reps - 2x   - Flexion-Extension Shoulder Pendulum with Table Support  - verbal review   - Seated Shoulder Pendulum Exercise  - verbal review   - Isometric Shoulder External Rotation  - 10 reps - 2x   - Isometric Shoulder Internal Rotation  - 10 reps - 2x   - Seated Scapular Retraction  - 5 reps - 3x     Continued education on med Bridge to increase patient adherence to home exercise program  Minimal/moderate verbal, visual, tactile cues needed for proper set of exercise - especially strength   Continued education on no increase in pain with exercises at this time  Continued discussion regarding patient progress   All patient questions  answered  Continued education on shoulder anatomy  Continued significant education on doctor's protocol and following protocol to avoid retears especially active range of motion into scaption and external rotation. All education to avoid using involved upper extremity to push up on surface at this time.     Skilled input: verbal instruction/cues and tactile instruction/cues    Writer verbally educated and received verbal consent for hand placement, positioning of patient, and techniques to be performed today from patient for clothing adjustments for techniques, therapist position for techniques and hand placement and palpation for techniques as described above and how they are pertinent to the patient's plan of care.    Home Exercise Program  Access Code: [email or web address]  Date: 05/10/2023  Prepared by: Mimi Quarles    Exercises  - Seated Single Arm Shoulder Flexion  - 2 x daily - 7 x weekly - 10 reps  - Seated Shoulder Flexion Towel Slide at Table Top  - 2-3 x daily - 7 x weekly - 10-15 reps  - Standing Single Arm Shoulder Flexion Towel Slide at Table Top  - 2-3 x daily - 7 x weekly - 10-15 reps  - Forward Pulley   - 2 x daily - 7 x weekly - 10 reps  - Supine Shoulder Flexion with Dowel to 90  - 2 x daily - 7 x weekly - 10 reps  - Seated Shoulder External Rotation AAROM with Dowel  - 2 x daily - 7 x weekly - 10 reps  - Flexion-Extension Shoulder Pendulum with Table Support  - 2-3 x daily - 7 x weekly - 10-15 reps  - Seated Shoulder Pendulum Exercise  - 2-3 x daily - 7 x weekly - 10-15 reps  - Isometric Shoulder External Rotation  - 2 x daily - 7 x weekly - 10 reps  - Isometric Shoulder Internal Rotation  - 2 x daily - 7 x weekly - 10 reps  - Seated Scapular Retraction  - 2-3 x daily - 7 x weekly - 10-15 reps  - Seated Thoracic Lumbar Extension with Pectoralis Stretch  - 2-3 x daily - 7 x weekly - 10-15 reps  - Seated Piriformis Stretch  - 2-3 x daily - 7 x weekly - 30-60 second hold      ASSESSMENT                                                                                                             Patient continues to be very fearful of re tearing shoulder. Patient does seem to continue to be overdoing it at home. He does wear the sling when coming to therapy this day; helps him keep arm at side. Patient encouraged to do isometric internal and external rotation to avoid increase in pain/overdoing it. Patient encourage again to take things easy and not push through pain with exercises or daily activities.   Education:   - Results of above outlined education: Verbalizes understanding and Demonstrates understanding    PLAN                                                                                                                           Suggestions for next session as indicated: Progress per plan of care, strength, stretch, posture, return to prior level of function        Therapy procedure time and total treatment time can be found documented on the Time Entry flowsheet

## 2024-05-16 NOTE — PRE PROCEDURE NOTE - PRE PROCEDURE EVALUATION
37yo F PMH abdominal pain, dx liver mass 1 year ago with recent weight loss presents today for liver mass biopsy.    Plan for image guided liver mass biopsy with conscious sedation / anesthesia 5/16

## 2024-05-16 NOTE — ASU PATIENT PROFILE, ADULT - FALL HARM RISK - UNIVERSAL INTERVENTIONS
Bed in lowest position, wheels locked, appropriate side rails in place/Call bell, personal items and telephone in reach/Instruct patient to call for assistance before getting out of bed or chair/Non-slip footwear when patient is out of bed/[place] to call system/Physically safe environment - no spills, clutter or unnecessary equipment/Purposeful Proactive Rounding/Room/bathroom lighting operational, light cord in reach

## 2024-05-16 NOTE — ASU PATIENT PROFILE, ADULT - NPO AFTER
Patient: Maria Victoria P[place]    Procedure Summary     Date: 10/28/20 Room / Location: [facility] ENDOSCOPY 3 / [facility] ENDOSCOPY    Anesthesia Start: 1339 Anesthesia Stop: 1403    Procedure: COLONOSCOPY WITH BIOPSY (N/A ) Diagnosis:       Ulcerative colitis with rectal bleeding, unspecified location (CMS/HCC)      Rectal bleed      Generalized abdominal pain      (Ulcerative colitis with rectal bleeding, unspecified location (CMS/HCC) [K51.911])      (Rectal bleed [K62.5])      (Generalized abdominal pain [R10.84])    Surgeon: Manjula Merrill MD Provider: Ta Lara CRNA    Anesthesia Type: MAC ASA Status: 2          Anesthesia Type: MAC    Vitals  Vitals Value Taken Time   /76 10/28/20 1440   Temp 97.8 °F (36.6 °C) 10/28/20 1410   Pulse 77 10/28/20 1440   Resp 18 10/28/20 1440   SpO2 100 % 10/28/20 1440           Post Anesthesia Care and Evaluation    Patient location during evaluation: PHASE II  Patient participation: complete - patient participated  Level of consciousness: awake  Pain score: 1  Pain management: adequate  Airway patency: patent  Anesthetic complications: No anesthetic complications  PONV Status: controlled  Cardiovascular status: acceptable and stable  Respiratory status: acceptable  Hydration status: acceptable       06:53

## 2024-05-16 NOTE — PROGRESS NOTE ADULT - SUBJECTIVE AND OBJECTIVE BOX
INTERVENTIONAL RADIOLOGY BRIEF-OPERATIVE NOTE    Procedure:  Percutaneous, CT-directed core needle biopsy of right lobe liver mass    Pre-Op Diagnosis:  Multiple liver masses    Post-Op Diagnosis:  Same    Attending:  Ashwin (IR) / Nelson (Pathology)  Resident:  None    Anesthesia (type):  [ ] General Anesthesia  [x] Sedation-- Versed, 0.5 mg and Fentanyl, 25 mcg, iv  [ ] Spinal Anesthesia  [x] Local/Regional-- 1% Lidocaine, SQ, RUQ, 12 cc    Total Face-to-Face Sedation Time:  1 hour, 6 minutes    Contrast:  None    Blood Loss:  3 cc    Condition:   [ ] Critical  [ ] Serious  [ ] Fair   [X] Good    Findings/Follow up Plan of Care:  Right lobe liver mass near capsule biopsied with 19/20G, 6/10cm Temno coaxial needle system using CT-guidance:  Six 20G tissue cores obtained and sent for cytopathology.  Specimens deemed adequate by Dr. Damon.  SQ tract treated with 2.5 cc of EnboCube (gelfoam).  Patient Tolerated well, without incident.  Post-biopsy CT showed no new sub-capsular or randall-hepatic collection.  VSS.    Specimens Removed:  Six 20G cores from right lobe liver mass    Implants:  None    Complications:  None immediate    Disposition:  Recovery, then discharge home and f/u Dr. Goodman.      Please call Interventional Radiology [phone] with any questions, concerns, or issues.

## 2024-05-16 NOTE — ASU DISCHARGE PLAN (ADULT/PEDIATRIC) - NS MD DC FALL RISK RISK
For information on Fall & Injury Prevention, visit: [email or web address] OR  [email or web address] OR  https://www.cdc.gov/steadi/patient.html

## 2024-05-17 DIAGNOSIS — R16.0 HEPATOMEGALY, NOT ELSEWHERE CLASSIFIED: ICD-10-CM

## 2024-05-19 ENCOUNTER — NON-APPOINTMENT (OUTPATIENT)
Age: 39
End: 2024-05-19

## 2024-05-20 ENCOUNTER — EMERGENCY (EMERGENCY)
Facility: HOSPITAL | Age: 39
LOS: 0 days | Discharge: HOME | End: 2024-05-20
Attending: EMERGENCY MEDICINE
Payer: MEDICAID

## 2024-05-20 VITALS
RESPIRATION RATE: 18 BRPM | WEIGHT: 132.94 LBS | HEIGHT: 65 IN | SYSTOLIC BLOOD PRESSURE: 105 MMHG | HEART RATE: 77 BPM | DIASTOLIC BLOOD PRESSURE: 67 MMHG | OXYGEN SATURATION: 100 % | TEMPERATURE: 98 F

## 2024-05-20 DIAGNOSIS — Z98.51 TUBAL LIGATION STATUS: Chronic | ICD-10-CM

## 2024-05-20 DIAGNOSIS — K59.00 CONSTIPATION, UNSPECIFIED: ICD-10-CM

## 2024-05-20 DIAGNOSIS — Z82.49 FAMILY HISTORY OF ISCHEMIC HEART DISEASE AND OTHER DISEASES OF THE CIRCULATORY SYSTEM: ICD-10-CM

## 2024-05-20 DIAGNOSIS — R06.00 DYSPNEA, UNSPECIFIED: ICD-10-CM

## 2024-05-20 DIAGNOSIS — R10.30 LOWER ABDOMINAL PAIN, UNSPECIFIED: ICD-10-CM

## 2024-05-20 DIAGNOSIS — Z90.89 ACQUIRED ABSENCE OF OTHER ORGANS: Chronic | ICD-10-CM

## 2024-05-20 DIAGNOSIS — Z98.890 OTHER SPECIFIED POSTPROCEDURAL STATES: Chronic | ICD-10-CM

## 2024-05-20 DIAGNOSIS — Z90.710 ACQUIRED ABSENCE OF BOTH CERVIX AND UTERUS: Chronic | ICD-10-CM

## 2024-05-20 DIAGNOSIS — Z91.013 ALLERGY TO SEAFOOD: ICD-10-CM

## 2024-05-20 DIAGNOSIS — R11.0 NAUSEA: ICD-10-CM

## 2024-05-20 DIAGNOSIS — R07.89 OTHER CHEST PAIN: ICD-10-CM

## 2024-05-20 DIAGNOSIS — J34.2 DEVIATED NASAL SEPTUM: Chronic | ICD-10-CM

## 2024-05-20 LAB
ALBUMIN SERPL ELPH-MCNC: 4.4 G/DL — SIGNIFICANT CHANGE UP (ref 3.5–5.2)
ALP SERPL-CCNC: 54 U/L — SIGNIFICANT CHANGE UP (ref 30–115)
ALT FLD-CCNC: 16 U/L — SIGNIFICANT CHANGE UP (ref 0–41)
ANION GAP SERPL CALC-SCNC: 9 MMOL/L — SIGNIFICANT CHANGE UP (ref 7–14)
APPEARANCE UR: ABNORMAL
APTT BLD: 31.9 SEC — SIGNIFICANT CHANGE UP (ref 27–39.2)
AST SERPL-CCNC: 16 U/L — SIGNIFICANT CHANGE UP (ref 0–41)
BASOPHILS # BLD AUTO: 0.06 K/UL — SIGNIFICANT CHANGE UP (ref 0–0.2)
BASOPHILS NFR BLD AUTO: 0.8 % — SIGNIFICANT CHANGE UP (ref 0–1)
BILIRUB SERPL-MCNC: 0.3 MG/DL — SIGNIFICANT CHANGE UP (ref 0.2–1.2)
BILIRUB UR-MCNC: NEGATIVE — SIGNIFICANT CHANGE UP
BUN SERPL-MCNC: 7 MG/DL — LOW (ref 10–20)
CALCIUM SERPL-MCNC: 9.2 MG/DL — SIGNIFICANT CHANGE UP (ref 8.4–10.5)
CHLORIDE SERPL-SCNC: 105 MMOL/L — SIGNIFICANT CHANGE UP (ref 98–110)
CO2 SERPL-SCNC: 25 MMOL/L — SIGNIFICANT CHANGE UP (ref 17–32)
COLOR SPEC: YELLOW — SIGNIFICANT CHANGE UP
CREAT SERPL-MCNC: 0.7 MG/DL — SIGNIFICANT CHANGE UP (ref 0.7–1.5)
DIFF PNL FLD: ABNORMAL
EGFR: 114 ML/MIN/1.73M2 — SIGNIFICANT CHANGE UP
EOSINOPHIL # BLD AUTO: 0.1 K/UL — SIGNIFICANT CHANGE UP (ref 0–0.7)
EOSINOPHIL NFR BLD AUTO: 1.4 % — SIGNIFICANT CHANGE UP (ref 0–8)
GLUCOSE SERPL-MCNC: 93 MG/DL — SIGNIFICANT CHANGE UP (ref 70–99)
GLUCOSE UR QL: NEGATIVE MG/DL — SIGNIFICANT CHANGE UP
HCG SERPL QL: NEGATIVE — SIGNIFICANT CHANGE UP
HCT VFR BLD CALC: 39.7 % — SIGNIFICANT CHANGE UP (ref 37–47)
HGB BLD-MCNC: 13.4 G/DL — SIGNIFICANT CHANGE UP (ref 12–16)
IMM GRANULOCYTES NFR BLD AUTO: 0.1 % — SIGNIFICANT CHANGE UP (ref 0.1–0.3)
INR BLD: 1.1 RATIO — SIGNIFICANT CHANGE UP (ref 0.65–1.3)
KETONES UR-MCNC: NEGATIVE MG/DL — SIGNIFICANT CHANGE UP
LEUKOCYTE ESTERASE UR-ACNC: NEGATIVE — SIGNIFICANT CHANGE UP
LIDOCAIN IGE QN: 17 U/L — SIGNIFICANT CHANGE UP (ref 7–60)
LYMPHOCYTES # BLD AUTO: 2.36 K/UL — SIGNIFICANT CHANGE UP (ref 1.2–3.4)
LYMPHOCYTES # BLD AUTO: 32.8 % — SIGNIFICANT CHANGE UP (ref 20.5–51.1)
MCHC RBC-ENTMCNC: 31.2 PG — HIGH (ref 27–31)
MCHC RBC-ENTMCNC: 33.8 G/DL — SIGNIFICANT CHANGE UP (ref 32–37)
MCV RBC AUTO: 92.3 FL — SIGNIFICANT CHANGE UP (ref 81–99)
MONOCYTES # BLD AUTO: 0.52 K/UL — SIGNIFICANT CHANGE UP (ref 0.1–0.6)
MONOCYTES NFR BLD AUTO: 7.2 % — SIGNIFICANT CHANGE UP (ref 1.7–9.3)
NEUTROPHILS # BLD AUTO: 4.14 K/UL — SIGNIFICANT CHANGE UP (ref 1.4–6.5)
NEUTROPHILS NFR BLD AUTO: 57.7 % — SIGNIFICANT CHANGE UP (ref 42.2–75.2)
NITRITE UR-MCNC: NEGATIVE — SIGNIFICANT CHANGE UP
NRBC # BLD: 0 /100 WBCS — SIGNIFICANT CHANGE UP (ref 0–0)
NT-PROBNP SERPL-SCNC: <36 PG/ML — SIGNIFICANT CHANGE UP (ref 0–300)
PH UR: 6 — SIGNIFICANT CHANGE UP (ref 5–8)
PLATELET # BLD AUTO: 192 K/UL — SIGNIFICANT CHANGE UP (ref 130–400)
PMV BLD: 12 FL — HIGH (ref 7.4–10.4)
POTASSIUM SERPL-MCNC: 4.4 MMOL/L — SIGNIFICANT CHANGE UP (ref 3.5–5)
POTASSIUM SERPL-SCNC: 4.4 MMOL/L — SIGNIFICANT CHANGE UP (ref 3.5–5)
PROT SERPL-MCNC: 6.7 G/DL — SIGNIFICANT CHANGE UP (ref 6–8)
PROT UR-MCNC: NEGATIVE MG/DL — SIGNIFICANT CHANGE UP
PROTHROM AB SERPL-ACNC: 12.6 SEC — SIGNIFICANT CHANGE UP (ref 9.95–12.87)
RBC # BLD: 4.3 M/UL — SIGNIFICANT CHANGE UP (ref 4.2–5.4)
RBC # FLD: 12.2 % — SIGNIFICANT CHANGE UP (ref 11.5–14.5)
SODIUM SERPL-SCNC: 139 MMOL/L — SIGNIFICANT CHANGE UP (ref 135–146)
SP GR SPEC: 1.01 — SIGNIFICANT CHANGE UP (ref 1–1.03)
TROPONIN T, HIGH SENSITIVITY RESULT: <6 NG/L — SIGNIFICANT CHANGE UP (ref 6–13)
UROBILINOGEN FLD QL: 0.2 MG/DL — SIGNIFICANT CHANGE UP (ref 0.2–1)
WBC # BLD: 7.19 K/UL — SIGNIFICANT CHANGE UP (ref 4.8–10.8)
WBC # FLD AUTO: 7.19 K/UL — SIGNIFICANT CHANGE UP (ref 4.8–10.8)

## 2024-05-20 PROCEDURE — 71275 CT ANGIOGRAPHY CHEST: CPT | Mod: MC

## 2024-05-20 PROCEDURE — 85610 PROTHROMBIN TIME: CPT

## 2024-05-20 PROCEDURE — 84703 CHORIONIC GONADOTROPIN ASSAY: CPT

## 2024-05-20 PROCEDURE — 74177 CT ABD & PELVIS W/CONTRAST: CPT | Mod: MC

## 2024-05-20 PROCEDURE — 93010 ELECTROCARDIOGRAM REPORT: CPT

## 2024-05-20 PROCEDURE — 71046 X-RAY EXAM CHEST 2 VIEWS: CPT | Mod: 26

## 2024-05-20 PROCEDURE — 99285 EMERGENCY DEPT VISIT HI MDM: CPT | Mod: 25

## 2024-05-20 PROCEDURE — 85730 THROMBOPLASTIN TIME PARTIAL: CPT

## 2024-05-20 PROCEDURE — 83690 ASSAY OF LIPASE: CPT

## 2024-05-20 PROCEDURE — 80053 COMPREHEN METABOLIC PANEL: CPT

## 2024-05-20 PROCEDURE — 71046 X-RAY EXAM CHEST 2 VIEWS: CPT

## 2024-05-20 PROCEDURE — 83880 ASSAY OF NATRIURETIC PEPTIDE: CPT

## 2024-05-20 PROCEDURE — 96374 THER/PROPH/DIAG INJ IV PUSH: CPT | Mod: XU

## 2024-05-20 PROCEDURE — 99285 EMERGENCY DEPT VISIT HI MDM: CPT

## 2024-05-20 PROCEDURE — 84484 ASSAY OF TROPONIN QUANT: CPT

## 2024-05-20 PROCEDURE — 81001 URINALYSIS AUTO W/SCOPE: CPT

## 2024-05-20 PROCEDURE — 93005 ELECTROCARDIOGRAM TRACING: CPT

## 2024-05-20 PROCEDURE — 74177 CT ABD & PELVIS W/CONTRAST: CPT | Mod: 26,MC

## 2024-05-20 PROCEDURE — 85025 COMPLETE CBC W/AUTO DIFF WBC: CPT

## 2024-05-20 PROCEDURE — 36415 COLL VENOUS BLD VENIPUNCTURE: CPT

## 2024-05-20 PROCEDURE — 71275 CT ANGIOGRAPHY CHEST: CPT | Mod: 26,MC

## 2024-05-20 RX ORDER — ACETAMINOPHEN 500 MG
975 TABLET ORAL ONCE
Refills: 0 | Status: COMPLETED | OUTPATIENT
Start: 2024-05-20 | End: 2024-05-20

## 2024-05-20 RX ORDER — ONDANSETRON 8 MG/1
4 TABLET, FILM COATED ORAL ONCE
Refills: 0 | Status: COMPLETED | OUTPATIENT
Start: 2024-05-20 | End: 2024-05-20

## 2024-05-20 RX ADMIN — ONDANSETRON 4 MILLIGRAM(S): 8 TABLET, FILM COATED ORAL at 14:47

## 2024-05-20 RX ADMIN — Medication 975 MILLIGRAM(S): at 14:47

## 2024-05-20 NOTE — ED PROVIDER NOTE - CLINICAL SUMMARY MEDICAL DECISION MAKING FREE TEXT BOX
Patient signed out to me from Dr. [facility] EKG imaging reviewed.  Patient declining further evaluation in ED, offered observation, declining at this time.  Would prefer to follow-up outpatient.  Aware of all results, given a copy of all available results, comfortable with discharge and follow-up outpatient, strict return precautions given. Endorses understanding and aware they can return at any time for further evaluation. No further questions or concerns at this time.

## 2024-05-20 NOTE — ED PROVIDER NOTE - ATTENDING APP SHARED VISIT CONTRIBUTION OF CARE
38-year-old female, patient medical history and is documented, presenting with right upper and right lower abdominal pain since last night with associated constipation.  States that she also feels mildly nauseous.  Pain is cramping, radiating to her back, no palliative or provocative factors.  Denies having this before.  States that she had a biopsy recently of the liver 5 days ago but states that she has had abdominal pain long before that.  States since the biopsy she is having shortness of breath with exertion to the point that she needs to stop while walking to catch her breath, has associated chest pain that resolves with rest.  Denies history of this in the past.  Otherwise denies fevers, palpitations, vomiting, bloody stool, urinary symptoms or any other complaints.    Vital Signs: I have reviewed the initial vital signs.  Constitutional: NAD, well-nourished, appears stated age, no acute distress.  HEENT: Airway patent, moist MM, no erythema/swelling/deformity of oral structures. EOMI, PERRLA.  CV: regular rate, regular rhythm, well-perfused extremities, 2+ b/l DP and radial pulses equal.  Lungs: BCTA, no increased WOB.  ABD: (+) diffuse tenderness, no guarding or rebound, no pulsatile mass, no hernias.   MSK: Neck supple, nontender, nl ROM, no stepoff. Chest nontender. Back nontender in TLS spine or to b/l bony structures or flanks. Ext nontender, nl rom, no deformity.   INTEG: Skin warm, dry, no rash.  NEURO: A&Ox3, normal strength, nl sensation throughout, normal speech.   PSYCH: Calm, cooperative, normal affect and interaction.    Lungs clear. Will obtain EKG, labs, CTA chest to r/o PE given recent medical procedure and now dyspneic on exertion, CT abd/pelvis for tenderness, re-eval. Patient with (+) family history of cardiac disease - mother had CABG in 40s. No prior cardiac work up.

## 2024-05-20 NOTE — ED PROVIDER NOTE - OBJECTIVE STATEMENT
Pt is a 39y/o female hx of liver lesion s/p biopsy last week here for eval of MEZA and right sided reproducible cp x 3 days. Pt denies leg swelling, fever, chills, Cough, n/v/d.

## 2024-05-20 NOTE — ED PROVIDER NOTE - PATIENT PORTAL LINK FT
You can access the FollowMyHealth Patient Portal offered by [facility] by registering at the following website: [email or web address]. By joining [facility]’s FollowMyHealth portal, you will also be able to view your health information using other applications (apps) compatible with our system.

## 2024-05-20 NOTE — ED PROVIDER NOTE - NSFOLLOWUPINSTRUCTIONS_ED_ALL_ED_FT
Our Emergency Department Referral Coordinators will be reaching out to you in the next 24-48 hours from 9:00am to 5:00pm with a follow up appointment. Please expect a phone call from the hospital in that time frame. If you do not receive a call or if you have any questions or concerns, you can reach them at   [phone]

## 2024-05-20 NOTE — ED ADULT TRIAGE NOTE - CHIEF COMPLAINT QUOTE
Pt c/o right sided abd since last night radiating to her back, unable to have a BM, last bm Thursday, reports nausea.   states she had a biopsy of her liver done under her right breast Thursday 5/15 and since she gets short of breath walking up stairs.

## 2024-05-20 NOTE — ED PROVIDER NOTE - PROGRESS NOTE DETAILS
ELVIRA: Case endorsed to Dr. Schreiber pending CTA chest and CT abd/pelvis reads, re-eval, dispo workup neg, pt offered admisison to OBS for further cardo eval given family hx of cardiac dz but opted for close outpatient f/u Will dc with carido referral

## 2024-05-23 ENCOUNTER — NON-APPOINTMENT (OUTPATIENT)
Age: 39
End: 2024-05-23

## 2024-05-24 LAB — NON-GYNECOLOGICAL CYTOLOGY STUDY: SIGNIFICANT CHANGE UP

## 2024-05-28 ENCOUNTER — APPOINTMENT (OUTPATIENT)
Dept: SURGERY | Facility: CLINIC | Age: 39
End: 2024-05-28
Payer: MEDICAID

## 2024-05-28 VITALS
DIASTOLIC BLOOD PRESSURE: 68 MMHG | OXYGEN SATURATION: 99 % | HEIGHT: 64 IN | BODY MASS INDEX: 22.53 KG/M2 | SYSTOLIC BLOOD PRESSURE: 92 MMHG | HEART RATE: 65 BPM | TEMPERATURE: 97 F | WEIGHT: 132 LBS

## 2024-05-28 DIAGNOSIS — R16.0 HEPATOMEGALY, NOT ELSEWHERE CLASSIFIED: ICD-10-CM

## 2024-05-28 PROCEDURE — 99214 OFFICE O/P EST MOD 30 MIN: CPT

## 2024-05-30 ENCOUNTER — NON-APPOINTMENT (OUTPATIENT)
Age: 39
End: 2024-05-30

## 2024-05-30 NOTE — ASSESSMENT
[FreeTextEntry1] : Liver masses (573.8) (R16.0) SIMONE HOLDEN is a pleasant 37 year year old woman who came in 09/28/2023 for mulitple liver lesions. She has Dr ANA ENRIQUEZ as Her PCP.  Gastrointestinal HPI: 38 yo f with history of PCOS , s/p hysterectomy 5/2023 referred by primary GI for evaluation of hepatic lesions. CT revealed hepatic lesions during ER visit for abdominal pain. Now MRI performed, multiple hepatic lesions noted as well in both hepatic lobes: largest 3.5 cm, suspicious for focal nodular hyperplasia or hepatic adenoma.  patient recently had egd and colonoscopy for work up of abdominal pain. colon revealing ext hemorrhoids and rectal hyperplastic polyp. egd unrevealing of acute pathology. The patient denies nausea, vomiting, dysphagia, odynophagia, post prandial abdominal pain, or melena.   mother: ESRD/HD, she had kidney cancer, sister: adrenal gland tumor, cervical cancer and thyroid ca, fibrolamellar ca grandma sister breast cancer  9/28/2023: discussed need to repeat imaging and discuss at Gi tumor board, patient was discussed after her visit and consensus was to repeat MRI with eovist with the impression by our body imager as FNH, will see her after MRI, possible multiple adenoma and her imaging not mathcing fibrolamellar ca but we will make final decision about biopsy or not based on her coming rmi with eovist  11/7/2023: MRI liver showed features consistent with FNH, will repeat MRI feb 2024, requested to bring her sister slides of path to review , offered bx of her FNH but she would rather hold for bx,   4/23/2024:reported some weight loss (9 pounds over last 6 months), MRI april 2024->Since November 1, 2023, multiple unchanged arterially enhancing hepatic lesions compatible with focal nodular hyperplasia (FNH). given her sister Fibrolamellar ca and her recent weight loss will send for liver bx.  05/28/2024 : no new reported symptoms(STILL RIGHT CHEST WALL BACK PAIN), exam is unchanged, S/P liver bx -> FNH, went recently to [facility] with chest pain, CAP CT  no changes,  will review her imaging and bring her back in 6 months with new MRI liver reviewed at liver conference  the above plan of care with discussed in details to the patient and all questions were answered to patient satisfaction. patient instructed to follow up with the referring physician and patient primary care provider  A total of 30 minutes was spent on this visit, obtaining h/p, reviewing previous notes/imaging, counseling the patient on f/u , ordering tests (below), discussing her at GI tumor board and documenting the findings in the note.

## 2024-05-30 NOTE — HISTORY OF PRESENT ILLNESS
[de-identified] : SIMONE HOLDEN  is a pleasant 37 year year old woman  who came in 09/28/2023 for mulitple liver lesions. She has Dr ANA ENRIQUEZ as Her PCP.  Gastrointestinal HPI: 36 yo f with history of PCOS , s/p hysterectomy 5/2023 referred by primary GI for evaluation of hepatic lesions. CT revealed hepatic lesions during ER visit for abdominal pain. Now MRI performed, multiple hepatic lesions noted as well in both hepatic lobes: largest 3.5 cm, suspicious for focal nodular hyperplasia or hepatic adenoma.  patient recently had egd and colonoscopy for work up of abdominal pain. colon revealing ext hemorrhoids and rectal hyperplastic polyp. egd unrevealing of acute pathology. The patient denies nausea, vomiting, dysphagia, odynophagia, post prandial abdominal pain, or melena.    mother: ESRD/HD, she had kidney cancer,  sister: adrenal gland tumor, cervical cancer and thyroid ca, fibrolamellar ca grandma sister breast cancer  mother of 2 children  gained 15 pounds over last couple months  hysterectomy may 2022 for bleeding

## 2024-06-12 ENCOUNTER — APPOINTMENT (OUTPATIENT)
Dept: CARDIOLOGY | Facility: CLINIC | Age: 39
End: 2024-06-12

## 2024-06-24 NOTE — PATIENT PROFILE ADULT - MEDICATIONS/VISITS
Heart rate trends reviewed remained stable continue aspirin for anticoagulation isosorbide and Coreg for rate control    no

## 2024-08-29 ENCOUNTER — RX RENEWAL (OUTPATIENT)
Age: 39
End: 2024-08-29

## 2024-09-19 ENCOUNTER — APPOINTMENT (OUTPATIENT)
Dept: OBGYN | Facility: CLINIC | Age: 39
End: 2024-09-19

## 2024-09-19 DIAGNOSIS — Z34.90 ENCOUNTER FOR SUPERVISION OF NORMAL PREGNANCY, UNSPECIFIED, UNSPECIFIED TRIMESTER: ICD-10-CM

## 2024-09-19 PROCEDURE — 99395 PREV VISIT EST AGE 18-39: CPT

## 2024-09-22 LAB
C TRACH RRNA SPEC QL NAA+PROBE: NOT DETECTED
N GONORRHOEA RRNA SPEC QL NAA+PROBE: NOT DETECTED
SOURCE TP AMPLIFICATION: NORMAL

## 2024-09-29 LAB
CYTOLOGY CVX/VAG DOC THIN PREP: ABNORMAL
HPV HIGH+LOW RISK DNA PNL CVX: NOT DETECTED

## 2024-11-13 ENCOUNTER — NON-APPOINTMENT (OUTPATIENT)
Age: 39
End: 2024-11-13

## 2024-11-22 ENCOUNTER — APPOINTMENT (OUTPATIENT)
Dept: OTOLARYNGOLOGY | Facility: CLINIC | Age: 39
End: 2024-11-22

## 2024-11-29 ENCOUNTER — RESULT REVIEW (OUTPATIENT)
Age: 39
End: 2024-11-29

## 2024-11-29 ENCOUNTER — OUTPATIENT (OUTPATIENT)
Dept: OUTPATIENT SERVICES | Facility: HOSPITAL | Age: 39
LOS: 1 days | End: 2024-11-29
Payer: MEDICAID

## 2024-11-29 DIAGNOSIS — R16.0 HEPATOMEGALY, NOT ELSEWHERE CLASSIFIED: ICD-10-CM

## 2024-11-29 DIAGNOSIS — Z98.51 TUBAL LIGATION STATUS: Chronic | ICD-10-CM

## 2024-11-29 DIAGNOSIS — Z98.890 OTHER SPECIFIED POSTPROCEDURAL STATES: Chronic | ICD-10-CM

## 2024-11-29 DIAGNOSIS — J34.2 DEVIATED NASAL SEPTUM: Chronic | ICD-10-CM

## 2024-11-29 DIAGNOSIS — Z90.710 ACQUIRED ABSENCE OF BOTH CERVIX AND UTERUS: Chronic | ICD-10-CM

## 2024-11-29 DIAGNOSIS — Z00.8 ENCOUNTER FOR OTHER GENERAL EXAMINATION: ICD-10-CM

## 2024-11-29 DIAGNOSIS — Z90.89 ACQUIRED ABSENCE OF OTHER ORGANS: Chronic | ICD-10-CM

## 2024-11-29 PROCEDURE — A9581: CPT

## 2024-11-29 PROCEDURE — 74183 MRI ABD W/O CNTR FLWD CNTR: CPT

## 2024-11-29 PROCEDURE — 74183 MRI ABD W/O CNTR FLWD CNTR: CPT | Mod: 26

## 2024-11-30 DIAGNOSIS — R16.0 HEPATOMEGALY, NOT ELSEWHERE CLASSIFIED: ICD-10-CM

## 2024-12-04 NOTE — ED ADULT NURSE NOTE - PRIMARY CARE PROVIDER
Findings    Right Carotid    Right arm BP: 134/68 mmHg.     Common Carotid Artery: Patent.   Internal Carotid Artery: Mild (<50%) stenosis in the proximal ICA. Heterogeneous and calcific plaque.   External Carotid Artery: Patent.   Vertebral Artery: Flow is antegrade.   Left Carotid    Left arm BP: 132/71 mmHg.     Common Carotid Artery: Patent.   Internal Carotid Artery: Mild (<50%) stenosis in the proximal ICA. Heterogeneous and calcific plaque.    External Carotid Artery: Patent.   Vertebral Artery: Flow is antegrade.     Carotid Measurements     Right PSV Right EDV Left PSV Left EDV   ICA/CCA Ratio 1.88        1.09          CCA Prox 107 cm/s       22.4 cm/s       138 cm/s       29.1 cm/s         CCA Dist 93.1 cm/s       37.4 cm/s       95 cm/s       29.1 cm/s         ICA Prox 109 cm/s       37.9 cm/s       92.7 cm/s       23 cm/s         ICA Mid 175 cm/s       37.2 cm/s       104 cm/s       36.8 cm/s         ICA Dist 90 cm/s       29.4 cm/s       104 cm/s       36 cm/s          cm/s       20.5 cm/s       86.6 cm/s       11.5 cm/s         Vertebral 55.4 cm/s       11.3 cm/s       50 cm/s       15.1 cm/s           Arterial Pressure Measurements     Right Right Comp Left Left comp   Brachial 134 mmHg       68 mmHg       132 mmHg       71 mmHg               PHYSICAL EXAM  VITALS: /71 (Site: Left Upper Arm, Position: Supine, Cuff Size: Medium Adult)   Pulse 62   Ht 1.575 m (5' 2\")   Wt 52.2 kg (115 lb)   LMP  (LMP Unknown)   SpO2 98%   BMI 21.03 kg/m²       GENERAL: Well developed, well nourished, in NAD  HEAD/NECK: normocephalic, atraumatic, neck supple  MUSCULOSKELETAL: normal gait  NEURO: sensation and strength grossly intact and symmetrical  PSYCH: alert and oriented to person, place and time      Assessment/Plan: Patient is a 71-year-old female who follows up for her yearly carotid duplex study.  No new lateralizing neurological symptoms.  She remains on aspirin and Crestor.  She has  on file

## 2024-12-05 ENCOUNTER — APPOINTMENT (OUTPATIENT)
Dept: SURGERY | Facility: CLINIC | Age: 39
End: 2024-12-05
Payer: MEDICAID

## 2024-12-05 DIAGNOSIS — R16.0 HEPATOMEGALY, NOT ELSEWHERE CLASSIFIED: ICD-10-CM

## 2024-12-05 PROCEDURE — 99214 OFFICE O/P EST MOD 30 MIN: CPT

## 2024-12-06 ENCOUNTER — EMERGENCY (EMERGENCY)
Facility: HOSPITAL | Age: 39
LOS: 0 days | Discharge: HOME | End: 2024-12-06
Attending: STUDENT IN AN ORGANIZED HEALTH CARE EDUCATION/TRAINING PROGRAM
Payer: MEDICAID

## 2024-12-06 VITALS
WEIGHT: 128.97 LBS | HEIGHT: 65 IN | DIASTOLIC BLOOD PRESSURE: 62 MMHG | HEART RATE: 70 BPM | SYSTOLIC BLOOD PRESSURE: 92 MMHG | OXYGEN SATURATION: 98 % | TEMPERATURE: 98 F | RESPIRATION RATE: 18 BRPM

## 2024-12-06 VITALS
SYSTOLIC BLOOD PRESSURE: 100 MMHG | RESPIRATION RATE: 18 BRPM | DIASTOLIC BLOOD PRESSURE: 66 MMHG | OXYGEN SATURATION: 100 % | HEART RATE: 61 BPM | TEMPERATURE: 98 F

## 2024-12-06 DIAGNOSIS — Z98.890 OTHER SPECIFIED POSTPROCEDURAL STATES: Chronic | ICD-10-CM

## 2024-12-06 DIAGNOSIS — Z98.51 TUBAL LIGATION STATUS: Chronic | ICD-10-CM

## 2024-12-06 DIAGNOSIS — Z90.89 ACQUIRED ABSENCE OF OTHER ORGANS: Chronic | ICD-10-CM

## 2024-12-06 DIAGNOSIS — J34.2 DEVIATED NASAL SEPTUM: Chronic | ICD-10-CM

## 2024-12-06 DIAGNOSIS — Z90.710 ACQUIRED ABSENCE OF BOTH CERVIX AND UTERUS: Chronic | ICD-10-CM

## 2024-12-06 LAB
ALBUMIN SERPL ELPH-MCNC: 4.2 G/DL — SIGNIFICANT CHANGE UP (ref 3.5–5.2)
ALP SERPL-CCNC: 53 U/L — SIGNIFICANT CHANGE UP (ref 30–115)
ALT FLD-CCNC: 16 U/L — SIGNIFICANT CHANGE UP (ref 0–41)
ANION GAP SERPL CALC-SCNC: 9 MMOL/L — SIGNIFICANT CHANGE UP (ref 7–14)
APPEARANCE UR: ABNORMAL
AST SERPL-CCNC: 19 U/L — SIGNIFICANT CHANGE UP (ref 0–41)
BACTERIA # UR AUTO: ABNORMAL /HPF
BASOPHILS # BLD AUTO: 0.06 K/UL — SIGNIFICANT CHANGE UP (ref 0–0.2)
BASOPHILS NFR BLD AUTO: 1.1 % — HIGH (ref 0–1)
BILIRUB SERPL-MCNC: 0.4 MG/DL — SIGNIFICANT CHANGE UP (ref 0.2–1.2)
BILIRUB UR-MCNC: NEGATIVE — SIGNIFICANT CHANGE UP
BUN SERPL-MCNC: 8 MG/DL — LOW (ref 10–20)
CALCIUM SERPL-MCNC: 9.2 MG/DL — SIGNIFICANT CHANGE UP (ref 8.4–10.5)
CAST: 2 /LPF — SIGNIFICANT CHANGE UP (ref 0–4)
CHLORIDE SERPL-SCNC: 102 MMOL/L — SIGNIFICANT CHANGE UP (ref 98–110)
CO2 SERPL-SCNC: 25 MMOL/L — SIGNIFICANT CHANGE UP (ref 17–32)
COLOR SPEC: YELLOW — SIGNIFICANT CHANGE UP
CREAT SERPL-MCNC: 0.8 MG/DL — SIGNIFICANT CHANGE UP (ref 0.7–1.5)
DIFF PNL FLD: ABNORMAL
EGFR: 97 ML/MIN/1.73M2 — SIGNIFICANT CHANGE UP
EOSINOPHIL # BLD AUTO: 0.07 K/UL — SIGNIFICANT CHANGE UP (ref 0–0.7)
EOSINOPHIL NFR BLD AUTO: 1.3 % — SIGNIFICANT CHANGE UP (ref 0–8)
GLUCOSE SERPL-MCNC: 86 MG/DL — SIGNIFICANT CHANGE UP (ref 70–99)
GLUCOSE UR QL: NEGATIVE MG/DL — SIGNIFICANT CHANGE UP
HCG SERPL QL: NEGATIVE — SIGNIFICANT CHANGE UP
HCT VFR BLD CALC: 40 % — SIGNIFICANT CHANGE UP (ref 37–47)
HGB BLD-MCNC: 13.4 G/DL — SIGNIFICANT CHANGE UP (ref 12–16)
IMM GRANULOCYTES NFR BLD AUTO: 0.4 % — HIGH (ref 0.1–0.3)
KETONES UR-MCNC: NEGATIVE MG/DL — SIGNIFICANT CHANGE UP
LEUKOCYTE ESTERASE UR-ACNC: NEGATIVE — SIGNIFICANT CHANGE UP
LIDOCAIN IGE QN: 14 U/L — SIGNIFICANT CHANGE UP (ref 7–60)
LYMPHOCYTES # BLD AUTO: 2.04 K/UL — SIGNIFICANT CHANGE UP (ref 1.2–3.4)
LYMPHOCYTES # BLD AUTO: 36.7 % — SIGNIFICANT CHANGE UP (ref 20.5–51.1)
MCHC RBC-ENTMCNC: 31.4 PG — HIGH (ref 27–31)
MCHC RBC-ENTMCNC: 33.5 G/DL — SIGNIFICANT CHANGE UP (ref 32–37)
MCV RBC AUTO: 93.7 FL — SIGNIFICANT CHANGE UP (ref 81–99)
MONOCYTES # BLD AUTO: 0.35 K/UL — SIGNIFICANT CHANGE UP (ref 0.1–0.6)
MONOCYTES NFR BLD AUTO: 6.3 % — SIGNIFICANT CHANGE UP (ref 1.7–9.3)
NEUTROPHILS # BLD AUTO: 3.02 K/UL — SIGNIFICANT CHANGE UP (ref 1.4–6.5)
NEUTROPHILS NFR BLD AUTO: 54.2 % — SIGNIFICANT CHANGE UP (ref 42.2–75.2)
NITRITE UR-MCNC: NEGATIVE — SIGNIFICANT CHANGE UP
NRBC # BLD: 0 /100 WBCS — SIGNIFICANT CHANGE UP (ref 0–0)
PH UR: 6.5 — SIGNIFICANT CHANGE UP (ref 5–8)
PLATELET # BLD AUTO: 203 K/UL — SIGNIFICANT CHANGE UP (ref 130–400)
PMV BLD: 11.3 FL — HIGH (ref 7.4–10.4)
POTASSIUM SERPL-MCNC: 4.5 MMOL/L — SIGNIFICANT CHANGE UP (ref 3.5–5)
POTASSIUM SERPL-SCNC: 4.5 MMOL/L — SIGNIFICANT CHANGE UP (ref 3.5–5)
PROT SERPL-MCNC: 6.7 G/DL — SIGNIFICANT CHANGE UP (ref 6–8)
PROT UR-MCNC: SIGNIFICANT CHANGE UP MG/DL
RBC # BLD: 4.27 M/UL — SIGNIFICANT CHANGE UP (ref 4.2–5.4)
RBC # FLD: 12.1 % — SIGNIFICANT CHANGE UP (ref 11.5–14.5)
RBC CASTS # UR COMP ASSIST: 22 /HPF — HIGH (ref 0–4)
SODIUM SERPL-SCNC: 136 MMOL/L — SIGNIFICANT CHANGE UP (ref 135–146)
SP GR SPEC: 1.02 — SIGNIFICANT CHANGE UP (ref 1–1.03)
SQUAMOUS # UR AUTO: 12 /HPF — HIGH (ref 0–5)
UROBILINOGEN FLD QL: 0.2 MG/DL — SIGNIFICANT CHANGE UP (ref 0.2–1)
WBC # BLD: 5.56 K/UL — SIGNIFICANT CHANGE UP (ref 4.8–10.8)
WBC # FLD AUTO: 5.56 K/UL — SIGNIFICANT CHANGE UP (ref 4.8–10.8)
WBC UR QL: 4 /HPF — SIGNIFICANT CHANGE UP (ref 0–5)

## 2024-12-06 PROCEDURE — 99284 EMERGENCY DEPT VISIT MOD MDM: CPT | Mod: 25

## 2024-12-06 PROCEDURE — 81025 URINE PREGNANCY TEST: CPT

## 2024-12-06 PROCEDURE — 36000 PLACE NEEDLE IN VEIN: CPT | Mod: XU

## 2024-12-06 PROCEDURE — 81001 URINALYSIS AUTO W/SCOPE: CPT

## 2024-12-06 PROCEDURE — 80053 COMPREHEN METABOLIC PANEL: CPT

## 2024-12-06 PROCEDURE — 84703 CHORIONIC GONADOTROPIN ASSAY: CPT

## 2024-12-06 PROCEDURE — 74177 CT ABD & PELVIS W/CONTRAST: CPT | Mod: MC

## 2024-12-06 PROCEDURE — 99285 EMERGENCY DEPT VISIT HI MDM: CPT

## 2024-12-06 PROCEDURE — 83690 ASSAY OF LIPASE: CPT

## 2024-12-06 PROCEDURE — 74177 CT ABD & PELVIS W/CONTRAST: CPT | Mod: 26,MC

## 2024-12-06 PROCEDURE — 36415 COLL VENOUS BLD VENIPUNCTURE: CPT

## 2024-12-06 PROCEDURE — 85025 COMPLETE CBC W/AUTO DIFF WBC: CPT

## 2024-12-06 RX ORDER — SODIUM CHLORIDE 9 MG/ML
1000 INJECTION, SOLUTION INTRAMUSCULAR; INTRAVENOUS; SUBCUTANEOUS ONCE
Refills: 0 | Status: COMPLETED | OUTPATIENT
Start: 2024-12-06 | End: 2024-12-06

## 2024-12-06 RX ORDER — CEFPODOXIME PROXETIL 100 MG/5ML
1 GRANULE, FOR SUSPENSION ORAL
Qty: 20 | Refills: 0
Start: 2024-12-06 | End: 2024-12-15

## 2024-12-06 RX ADMIN — SODIUM CHLORIDE 1000 MILLILITER(S): 9 INJECTION, SOLUTION INTRAMUSCULAR; INTRAVENOUS; SUBCUTANEOUS at 13:53

## 2024-12-06 NOTE — ED ADULT TRIAGE NOTE - AS TEMP SITE
oral Quality 130: Documentation Of Current Medications In The Medical Record: Current Medications Documented Quality 131: Pain Assessment And Follow-Up: Pain assessment using a standardized tool is documented as negative, no follow-up plan required Detail Level: Detailed Quality 402: Tobacco Use And Help With Quitting Among Adolescents: Patient screened for tobacco and never smoked

## 2024-12-06 NOTE — ED ADULT NURSE NOTE - SUICIDE SCREENING DEPRESSION
HYDROCORTISONE 2.5% CREA  In chart as: hydroCORTisone (CORTIZONE) 2.5 % cream  LOV: 03/27/2017  Has appt for: 07/07/2017   Negative

## 2024-12-06 NOTE — ED PROVIDER NOTE - NSFOLLOWUPINSTRUCTIONS_ED_ALL_ED_FT
Follow up with your pcp in 1-2 days. begin new antibiotic as written. return to ED for worsening pain or fever    Flank Pain    Flank pain refers to pain that is located on the side of the body between the upper abdomen and the back. The pain may occur over a short period of time (acute) or may be long-term or reoccurring (chronic). It may be mild or severe. Flank pain can be caused by many things.    CAUSES  Some of the more common causes of flank pain include:    Muscle strains.    Muscle spasms.    A disease of your spine (vertebral disk disease).    A lung infection (pneumonia).    Fluid around your lungs (pulmonary edema).    A kidney infection.    Kidney stones.    A very painful skin rash caused by the chickenpox virus (shingles).    Gallbladder disease.      HOME CARE INSTRUCTIONS  Home care will depend on the cause of your pain. In general,    Rest as directed by your caregiver.  Drink enough fluids to keep your urine clear or pale yellow.  Only take over-the-counter or prescription medicines as directed by your caregiver. Some medicines may help relieve the pain.  Tell your caregiver about any changes in your pain.  Follow up with your caregiver as directed.    SEEK IMMEDIATE MEDICAL CARE IF:  Your pain is not controlled with medicine.    You have new or worsening symptoms.  Your pain increases.    You have abdominal pain.    You have shortness of breath.    You have persistent nausea or vomiting.    You have swelling in your abdomen.    You feel faint or pass out.    You have blood in your urine.  You have a fever or persistent symptoms for more than 2–3 days.  You have a fever and your symptoms suddenly get worse.     MAKE SURE YOU:  Understand these instructions.  Will watch your condition.  Will get help right away if you are not doing well or get worse.

## 2024-12-06 NOTE — ED PROVIDER NOTE - DIFFERENTIAL DIAGNOSIS
Differential diagnosis includes but not limited to nephrolithiasis, UTI, MSK. Differential Diagnosis

## 2024-12-06 NOTE — ED POST DISCHARGE NOTE - RESULT SUMMARY
pt switched to cipro 500mg bid for 7d, discussed over phone black box warning for tendonitis. pt aware does not want to spend out of packet cost for cefpodoxime

## 2024-12-06 NOTE — ED PROVIDER NOTE - OBJECTIVE STATEMENT
39 year old female with PMH noted in chart presents to ED with complaints of R flank pain. Pt states for last one month has been having R flank pain, however worse over the last 3 days. Pt states went to UC told she has blood and bacteria in urine, poissble uti, placed on bactrim. Pt states pain is moderate, non radiating no alleviating factors. Denies any fever, chills, bodyaches, chest pain, sob, abdominal pain, NVCD, dysuria or vaginal DC

## 2024-12-06 NOTE — ED PROVIDER NOTE - PHYSICAL EXAMINATION
Physical Exam    Vital Signs: I have reviewed the initial vital signs.  Constitutional: well-nourished, appears stated age, no acute distress  Eyes: Conjunctiva pink, Sclera clear, PERRLA, EOMI.  Cardiovascular: S1 and S2, regular rate, regular rhythm, well-perfused extremities, radial pulses equal and 2+  Respiratory: unlabored respiratory effort, clear to auscultation bilaterally no wheezing, rales and rhonchi  Gastrointestinal: soft, non-tender abdomen, no pulsatile mass, normal bowl sounds  Musculoskeletal: supple neck, no lower extremity edema, no midline tenderness. R CVAT  Integumentary: warm, dry, no rash  Neurologic: awake, alert, cranial nerves II-XII grossly intact, extremities’ motor and sensory functions grossly intact  Psychiatric: appropriate mood, appropriate affect

## 2024-12-06 NOTE — ED PROVIDER NOTE - PATIENT PORTAL LINK FT
You can access the FollowMyHealth Patient Portal offered by [facility] by registering at the following website: [email or web address]. By joining [facility]’s FollowMyHealth portal, you will also be able to view your health information using other applications (apps) compatible with our system.

## 2024-12-06 NOTE — ED ADULT NURSE NOTE - OBJECTIVE STATEMENT
Aox4 pt c/o R flank pain which has been ongoing for approx 1 month but as per pt worsened over last 3 days. IV placed, labs sent and fluids started per provider order

## 2024-12-06 NOTE — ED PROVIDER NOTE - ATTENDING CONTRIBUTION TO CARE
39-year-old female, PMH distant hysterectomy, presents with right-sided back pain x 1 month, worse x 1 week.  Pain is worse with some movements.  + Hematuria noted on blood test yesterday urgent care.  No weakness, numbness, trauma, bowel or bladder issues, fever.  No history renal stones.    Exam as noted above.  Patient is NAD, abdomen soft nontender, no midline spinal tenderness, + mild right CVAT.    Differential diagnosis includes but not limited to nephrolithiasis, UTI, MSK.    Plan: Labs, CT imaging, IVF, analgesics, UA, reassess

## 2024-12-06 NOTE — ED PROVIDER NOTE - PROGRESS NOTE DETAILS
: due to pt being on bactrim, will switch to pyelo to cover for pyelo, tolerating PO. strict ed predcautions

## 2024-12-06 NOTE — ED ADULT NURSE NOTE - NSFALLUNIVINTERV_ED_ALL_ED
Bed/Stretcher in lowest position, wheels locked, appropriate side rails in place/Call bell, personal items and telephone in reach/Instruct patient to call for assistance before getting out of bed/chair/stretcher/Non-slip footwear applied when patient is off stretcher/[place]rad to call system/Physically safe environment - no spills, clutter or unnecessary equipment/Purposeful proactive rounding/Room/bathroom lighting operational, light cord in reach

## 2024-12-28 ENCOUNTER — OUTPATIENT (OUTPATIENT)
Dept: OUTPATIENT SERVICES | Facility: HOSPITAL | Age: 39
LOS: 1 days | End: 2024-12-28
Payer: MEDICAID

## 2024-12-28 DIAGNOSIS — Z98.890 OTHER SPECIFIED POSTPROCEDURAL STATES: Chronic | ICD-10-CM

## 2024-12-28 DIAGNOSIS — Z98.51 TUBAL LIGATION STATUS: Chronic | ICD-10-CM

## 2024-12-28 DIAGNOSIS — Z90.710 ACQUIRED ABSENCE OF BOTH CERVIX AND UTERUS: Chronic | ICD-10-CM

## 2024-12-28 DIAGNOSIS — R07.89 OTHER CHEST PAIN: ICD-10-CM

## 2024-12-28 DIAGNOSIS — J34.2 DEVIATED NASAL SEPTUM: Chronic | ICD-10-CM

## 2024-12-28 DIAGNOSIS — Z90.89 ACQUIRED ABSENCE OF OTHER ORGANS: Chronic | ICD-10-CM

## 2024-12-28 PROCEDURE — 71046 X-RAY EXAM CHEST 2 VIEWS: CPT | Mod: 26

## 2024-12-28 PROCEDURE — 71046 X-RAY EXAM CHEST 2 VIEWS: CPT

## 2024-12-28 PROCEDURE — 71100 X-RAY EXAM RIBS UNI 2 VIEWS: CPT | Mod: 26,RT

## 2024-12-28 PROCEDURE — 71100 X-RAY EXAM RIBS UNI 2 VIEWS: CPT | Mod: RT

## 2024-12-29 DIAGNOSIS — R07.89 OTHER CHEST PAIN: ICD-10-CM

## 2025-03-08 ENCOUNTER — NON-APPOINTMENT (OUTPATIENT)
Age: 40
End: 2025-03-08

## 2025-03-15 ENCOUNTER — EMERGENCY (EMERGENCY)
Facility: HOSPITAL | Age: 40
LOS: 0 days | Discharge: HOME | End: 2025-03-16
Attending: EMERGENCY MEDICINE
Payer: MEDICAID

## 2025-03-15 VITALS
SYSTOLIC BLOOD PRESSURE: 107 MMHG | TEMPERATURE: 98 F | HEART RATE: 80 BPM | RESPIRATION RATE: 18 BRPM | WEIGHT: 134.92 LBS | DIASTOLIC BLOOD PRESSURE: 70 MMHG | OXYGEN SATURATION: 97 %

## 2025-03-15 DIAGNOSIS — L03.113 CELLULITIS OF RIGHT UPPER LIMB: ICD-10-CM

## 2025-03-15 DIAGNOSIS — Z98.51 TUBAL LIGATION STATUS: Chronic | ICD-10-CM

## 2025-03-15 DIAGNOSIS — Z23 ENCOUNTER FOR IMMUNIZATION: ICD-10-CM

## 2025-03-15 DIAGNOSIS — Z98.890 OTHER SPECIFIED POSTPROCEDURAL STATES: Chronic | ICD-10-CM

## 2025-03-15 DIAGNOSIS — L53.8 OTHER SPECIFIED ERYTHEMATOUS CONDITIONS: ICD-10-CM

## 2025-03-15 DIAGNOSIS — J34.2 DEVIATED NASAL SEPTUM: Chronic | ICD-10-CM

## 2025-03-15 DIAGNOSIS — R20.2 PARESTHESIA OF SKIN: ICD-10-CM

## 2025-03-15 DIAGNOSIS — Z90.89 ACQUIRED ABSENCE OF OTHER ORGANS: Chronic | ICD-10-CM

## 2025-03-15 DIAGNOSIS — M79.631 PAIN IN RIGHT FOREARM: ICD-10-CM

## 2025-03-15 DIAGNOSIS — Z90.710 ACQUIRED ABSENCE OF BOTH CERVIX AND UTERUS: Chronic | ICD-10-CM

## 2025-03-15 LAB
ALBUMIN SERPL ELPH-MCNC: 4 G/DL — SIGNIFICANT CHANGE UP (ref 3.5–5.2)
ALP SERPL-CCNC: 67 U/L — SIGNIFICANT CHANGE UP (ref 30–115)
ALT FLD-CCNC: 13 U/L — SIGNIFICANT CHANGE UP (ref 0–41)
ANION GAP SERPL CALC-SCNC: 8 MMOL/L — SIGNIFICANT CHANGE UP (ref 7–14)
AST SERPL-CCNC: 15 U/L — SIGNIFICANT CHANGE UP (ref 0–41)
BASOPHILS # BLD AUTO: 0 K/UL — SIGNIFICANT CHANGE UP (ref 0–0.2)
BASOPHILS NFR BLD AUTO: 0 % — SIGNIFICANT CHANGE UP (ref 0–1)
BILIRUB SERPL-MCNC: 0.2 MG/DL — SIGNIFICANT CHANGE UP (ref 0.2–1.2)
BUN SERPL-MCNC: 6 MG/DL — LOW (ref 10–20)
CALCIUM SERPL-MCNC: 8.7 MG/DL — SIGNIFICANT CHANGE UP (ref 8.4–10.5)
CHLORIDE SERPL-SCNC: 101 MMOL/L — SIGNIFICANT CHANGE UP (ref 98–110)
CK SERPL-CCNC: 32 U/L — SIGNIFICANT CHANGE UP (ref 0–225)
CO2 SERPL-SCNC: 30 MMOL/L — SIGNIFICANT CHANGE UP (ref 17–32)
CREAT SERPL-MCNC: 0.6 MG/DL — LOW (ref 0.7–1.5)
EGFR: 118 ML/MIN/1.73M2 — SIGNIFICANT CHANGE UP
EGFR: 118 ML/MIN/1.73M2 — SIGNIFICANT CHANGE UP
EOSINOPHIL # BLD AUTO: 0 K/UL — SIGNIFICANT CHANGE UP (ref 0–0.7)
EOSINOPHIL NFR BLD AUTO: 0 % — SIGNIFICANT CHANGE UP (ref 0–8)
GLUCOSE SERPL-MCNC: 69 MG/DL — LOW (ref 70–99)
HCG SERPL QL: NEGATIVE — SIGNIFICANT CHANGE UP
HCT VFR BLD CALC: 38.2 % — SIGNIFICANT CHANGE UP (ref 37–47)
HGB BLD-MCNC: 13 G/DL — SIGNIFICANT CHANGE UP (ref 12–16)
LYMPHOCYTES # BLD AUTO: 1.95 K/UL — SIGNIFICANT CHANGE UP (ref 1.2–3.4)
LYMPHOCYTES # BLD AUTO: 20.9 % — SIGNIFICANT CHANGE UP (ref 20.5–51.1)
MCHC RBC-ENTMCNC: 31.5 PG — HIGH (ref 27–31)
MCHC RBC-ENTMCNC: 34 G/DL — SIGNIFICANT CHANGE UP (ref 32–37)
MCV RBC AUTO: 92.5 FL — SIGNIFICANT CHANGE UP (ref 81–99)
MONOCYTES # BLD AUTO: 0.16 K/UL — SIGNIFICANT CHANGE UP (ref 0.1–0.6)
MONOCYTES NFR BLD AUTO: 1.7 % — SIGNIFICANT CHANGE UP (ref 1.7–9.3)
NEUTROPHILS # BLD AUTO: 6.42 K/UL — SIGNIFICANT CHANGE UP (ref 1.4–6.5)
NEUTROPHILS NFR BLD AUTO: 68.7 % — SIGNIFICANT CHANGE UP (ref 42.2–75.2)
PLATELET # BLD AUTO: 206 K/UL — SIGNIFICANT CHANGE UP (ref 130–400)
PMV BLD: 12.4 FL — HIGH (ref 7.4–10.4)
POTASSIUM SERPL-MCNC: 3.7 MMOL/L — SIGNIFICANT CHANGE UP (ref 3.5–5)
POTASSIUM SERPL-SCNC: 3.7 MMOL/L — SIGNIFICANT CHANGE UP (ref 3.5–5)
PROT SERPL-MCNC: 6.6 G/DL — SIGNIFICANT CHANGE UP (ref 6–8)
RBC # BLD: 4.13 M/UL — LOW (ref 4.2–5.4)
RBC # FLD: 11.9 % — SIGNIFICANT CHANGE UP (ref 11.5–14.5)
SODIUM SERPL-SCNC: 139 MMOL/L — SIGNIFICANT CHANGE UP (ref 135–146)
WBC # BLD: 9.35 K/UL — SIGNIFICANT CHANGE UP (ref 4.8–10.8)
WBC # FLD AUTO: 9.35 K/UL — SIGNIFICANT CHANGE UP (ref 4.8–10.8)

## 2025-03-15 PROCEDURE — 36415 COLL VENOUS BLD VENIPUNCTURE: CPT

## 2025-03-15 PROCEDURE — 73090 X-RAY EXAM OF FOREARM: CPT | Mod: 26,RT

## 2025-03-15 PROCEDURE — 93971 EXTREMITY STUDY: CPT | Mod: 26,RT

## 2025-03-15 PROCEDURE — 84703 CHORIONIC GONADOTROPIN ASSAY: CPT

## 2025-03-15 PROCEDURE — 82550 ASSAY OF CK (CPK): CPT

## 2025-03-15 PROCEDURE — 93971 EXTREMITY STUDY: CPT | Mod: RT

## 2025-03-15 PROCEDURE — 73090 X-RAY EXAM OF FOREARM: CPT | Mod: RT

## 2025-03-15 PROCEDURE — 90471 IMMUNIZATION ADMIN: CPT

## 2025-03-15 PROCEDURE — 90715 TDAP VACCINE 7 YRS/> IM: CPT

## 2025-03-15 PROCEDURE — 80053 COMPREHEN METABOLIC PANEL: CPT

## 2025-03-15 PROCEDURE — 99285 EMERGENCY DEPT VISIT HI MDM: CPT | Mod: 25

## 2025-03-15 PROCEDURE — 85025 COMPLETE CBC W/AUTO DIFF WBC: CPT

## 2025-03-15 PROCEDURE — 99285 EMERGENCY DEPT VISIT HI MDM: CPT

## 2025-03-15 PROCEDURE — 82962 GLUCOSE BLOOD TEST: CPT

## 2025-03-15 PROCEDURE — 96374 THER/PROPH/DIAG INJ IV PUSH: CPT

## 2025-03-15 RX ORDER — CEPHALEXIN 250 MG/1
500 CAPSULE ORAL ONCE
Refills: 0 | Status: COMPLETED | OUTPATIENT
Start: 2025-03-15 | End: 2025-03-15

## 2025-03-15 RX ORDER — ACETAMINOPHEN 500 MG/5ML
975 LIQUID (ML) ORAL ONCE
Refills: 0 | Status: COMPLETED | OUTPATIENT
Start: 2025-03-15 | End: 2025-03-15

## 2025-03-15 RX ORDER — IBUPROFEN 200 MG
600 TABLET ORAL ONCE
Refills: 0 | Status: DISCONTINUED | OUTPATIENT
Start: 2025-03-15 | End: 2025-03-15

## 2025-03-15 RX ORDER — KETOROLAC TROMETHAMINE 30 MG/ML
15 INJECTION, SOLUTION INTRAMUSCULAR; INTRAVENOUS ONCE
Refills: 0 | Status: DISCONTINUED | OUTPATIENT
Start: 2025-03-15 | End: 2025-03-15

## 2025-03-15 RX ORDER — SULFAMETHOXAZOLE/TRIMETHOPRIM 800-160 MG
1 TABLET ORAL ONCE
Refills: 0 | Status: COMPLETED | OUTPATIENT
Start: 2025-03-15 | End: 2025-03-15

## 2025-03-15 RX ADMIN — Medication 975 MILLIGRAM(S): at 21:41

## 2025-03-15 RX ADMIN — Medication 975 MILLIGRAM(S): at 20:27

## 2025-03-15 RX ADMIN — KETOROLAC TROMETHAMINE 15 MILLIGRAM(S): 30 INJECTION, SOLUTION INTRAMUSCULAR; INTRAVENOUS at 21:41

## 2025-03-15 RX ADMIN — KETOROLAC TROMETHAMINE 15 MILLIGRAM(S): 30 INJECTION, SOLUTION INTRAMUSCULAR; INTRAVENOUS at 20:27

## 2025-03-16 RX ORDER — CEPHALEXIN 250 MG/1
1 CAPSULE ORAL
Qty: 28 | Refills: 0
Start: 2025-03-16 | End: 2025-03-22

## 2025-03-16 RX ORDER — SULFAMETHOXAZOLE/TRIMETHOPRIM 800-160 MG
2 TABLET ORAL
Qty: 28 | Refills: 0
Start: 2025-03-16 | End: 2025-03-22

## 2025-03-16 RX ADMIN — Medication 1 TABLET(S): at 00:29

## 2025-03-16 RX ADMIN — CEPHALEXIN 500 MILLIGRAM(S): 250 CAPSULE ORAL at 00:29

## 2025-03-27 NOTE — ED PROVIDER NOTE - OBJECTIVE STATEMENT
Detail Level: Zone
Initiate Treatment: Begin the following treatment(s): advised patient to use sunscreen [id number] or above and to reapply on regular basis.
Detail Level: Detailed
34 yo F pmh of pcos, bilateral lumpectomy, endometrial ablation presents with right flank pain x 2 days. States that yesterday she started to have right flank pain, sharp, 10/10, radiating to her right abdomen, intermittent getting worse. + chills, no fevers, no n/v/d. + urinary frequency, no dysuria, no burning, no hematuria. no vaginal bleeding or discharge.
Initiate Treatment: use moisturizer daily

## 2025-05-21 ENCOUNTER — RX RENEWAL (OUTPATIENT)
Age: 40
End: 2025-05-21

## 2025-08-19 ENCOUNTER — NON-APPOINTMENT (OUTPATIENT)
Age: 40
End: 2025-08-19

## 2025-08-19 ENCOUNTER — APPOINTMENT (OUTPATIENT)
Dept: GASTROENTEROLOGY | Facility: CLINIC | Age: 40
End: 2025-08-19
Payer: MEDICAID

## 2025-08-19 VITALS — BODY MASS INDEX: 22.36 KG/M2 | HEIGHT: 64 IN | WEIGHT: 131 LBS

## 2025-08-19 DIAGNOSIS — R10.30 LOWER ABDOMINAL PAIN, UNSPECIFIED: ICD-10-CM

## 2025-08-19 DIAGNOSIS — Z87.891 PERSONAL HISTORY OF NICOTINE DEPENDENCE: ICD-10-CM

## 2025-08-19 DIAGNOSIS — K59.00 CONSTIPATION, UNSPECIFIED: ICD-10-CM

## 2025-08-19 DIAGNOSIS — Z80.0 FAMILY HISTORY OF MALIGNANT NEOPLASM OF DIGESTIVE ORGANS: ICD-10-CM

## 2025-08-19 DIAGNOSIS — K63.5 POLYP OF COLON: ICD-10-CM

## 2025-08-19 DIAGNOSIS — F17.200 NICOTINE DEPENDENCE, UNSPECIFIED, UNCOMPLICATED: ICD-10-CM

## 2025-08-19 PROCEDURE — 99214 OFFICE O/P EST MOD 30 MIN: CPT

## 2025-08-21 ENCOUNTER — NON-APPOINTMENT (OUTPATIENT)
Age: 40
End: 2025-08-21

## 2025-09-05 ENCOUNTER — OUTPATIENT (OUTPATIENT)
Dept: OUTPATIENT SERVICES | Facility: HOSPITAL | Age: 40
LOS: 1 days | End: 2025-09-05
Payer: MEDICAID

## 2025-09-05 DIAGNOSIS — J34.2 DEVIATED NASAL SEPTUM: Chronic | ICD-10-CM

## 2025-09-05 DIAGNOSIS — R16.0 HEPATOMEGALY, NOT ELSEWHERE CLASSIFIED: ICD-10-CM

## 2025-09-05 DIAGNOSIS — Z98.890 OTHER SPECIFIED POSTPROCEDURAL STATES: Chronic | ICD-10-CM

## 2025-09-05 DIAGNOSIS — Z98.51 TUBAL LIGATION STATUS: Chronic | ICD-10-CM

## 2025-09-05 DIAGNOSIS — Z90.89 ACQUIRED ABSENCE OF OTHER ORGANS: Chronic | ICD-10-CM

## 2025-09-05 DIAGNOSIS — Z90.710 ACQUIRED ABSENCE OF BOTH CERVIX AND UTERUS: Chronic | ICD-10-CM

## 2025-09-05 PROCEDURE — 74183 MRI ABD W/O CNTR FLWD CNTR: CPT | Mod: 26

## 2025-09-05 PROCEDURE — 74183 MRI ABD W/O CNTR FLWD CNTR: CPT

## 2025-09-05 PROCEDURE — A9579: CPT

## 2025-09-06 DIAGNOSIS — R16.0 HEPATOMEGALY, NOT ELSEWHERE CLASSIFIED: ICD-10-CM

## 2025-09-12 ENCOUNTER — NON-APPOINTMENT (OUTPATIENT)
Age: 40
End: 2025-09-12

## 2025-09-18 ENCOUNTER — APPOINTMENT (OUTPATIENT)
Dept: SURGERY | Facility: CLINIC | Age: 40
End: 2025-09-18